# Patient Record
Sex: FEMALE | Race: WHITE | NOT HISPANIC OR LATINO | Employment: OTHER | URBAN - METROPOLITAN AREA
[De-identification: names, ages, dates, MRNs, and addresses within clinical notes are randomized per-mention and may not be internally consistent; named-entity substitution may affect disease eponyms.]

---

## 2017-01-09 RX ORDER — LEVOTHYROXINE SODIUM 0.05 MG/1
50 TABLET ORAL
Qty: 30 TAB | Refills: 5 | Status: SHIPPED | OUTPATIENT
Start: 2017-01-09 | End: 2017-03-01 | Stop reason: SDUPTHER

## 2017-03-01 DIAGNOSIS — Z13.220 SCREENING FOR HYPERLIPIDEMIA: ICD-10-CM

## 2017-03-01 DIAGNOSIS — E03.9 ACQUIRED HYPOTHYROIDISM: ICD-10-CM

## 2017-03-01 DIAGNOSIS — Z13.1 SCREENING FOR DIABETES MELLITUS: ICD-10-CM

## 2017-03-01 DIAGNOSIS — Z13.0 SCREENING FOR DEFICIENCY ANEMIA: ICD-10-CM

## 2017-03-01 RX ORDER — LEVOTHYROXINE SODIUM 0.05 MG/1
50 TABLET ORAL
Qty: 90 TAB | Refills: 0 | Status: SHIPPED | OUTPATIENT
Start: 2017-03-01 | End: 2017-03-03 | Stop reason: SDUPTHER

## 2017-03-02 NOTE — TELEPHONE ENCOUNTER
Was the patient seen in the last year in this department? Yes     Does patient have an active prescription for medications requested? No     Received Request Via: Patient     Patient changed mail in pharmacies at the beginning of the year.

## 2017-03-03 RX ORDER — LEVOTHYROXINE SODIUM 0.05 MG/1
50 TABLET ORAL
Qty: 90 TAB | Refills: 0 | Status: SHIPPED | OUTPATIENT
Start: 2017-03-03 | End: 2017-07-31 | Stop reason: SDUPTHER

## 2017-03-03 NOTE — TELEPHONE ENCOUNTER
Patient left message apologizing for asking you to do this again, but [facility] called her and told stating her insurance is not covered under their program, she has to [facility]. Can you please send her prescription there.

## 2017-07-06 ENCOUNTER — PATIENT OUTREACH (OUTPATIENT)
Dept: HEALTH INFORMATION MANAGEMENT | Facility: OTHER | Age: 77
End: 2017-07-06

## 2017-07-06 NOTE — PROGRESS NOTES
7/6/17  [employer]Web[employer] Checked & Epic Updated: yes  HealthConnect Verified: no  Verify PCP: yes    Communication Preference Obtained: yes     Review Care Team: yes    Annual Wellness Visit Scheduling  1. Scheduling Status:Scheduled     Care Gap Scheduling (Attempt to Schedule EACH Overdue Care Gap!)     Health Maintenance Due   Topic Date Due   • Annual Wellness Visit  Scheduled   • IMM ZOSTER VACCINE  Scheduled   • MAMMOGRAM  Scheduled         [employer] Activation: sent activation code  [employer] Katie: yes  Virtual Visits: yes  Opt In to Text Messages: yes

## 2017-07-25 ENCOUNTER — OFFICE VISIT (OUTPATIENT)
Dept: MEDICAL GROUP | Facility: MEDICAL CENTER | Age: 77
End: 2017-07-25
Payer: MEDICARE

## 2017-07-25 VITALS
HEIGHT: 60 IN | SYSTOLIC BLOOD PRESSURE: 112 MMHG | HEART RATE: 67 BPM | OXYGEN SATURATION: 99 % | BODY MASS INDEX: 25.52 KG/M2 | TEMPERATURE: 98.6 F | DIASTOLIC BLOOD PRESSURE: 62 MMHG | WEIGHT: 130 LBS

## 2017-07-25 DIAGNOSIS — M87.180: ICD-10-CM

## 2017-07-25 DIAGNOSIS — E55.9 VITAMIN D DEFICIENCY: ICD-10-CM

## 2017-07-25 DIAGNOSIS — Z00.00 MEDICARE ANNUAL WELLNESS VISIT, INITIAL: Primary | ICD-10-CM

## 2017-07-25 DIAGNOSIS — T45.8X5A: ICD-10-CM

## 2017-07-25 DIAGNOSIS — E03.4 HYPOTHYROIDISM DUE TO ACQUIRED ATROPHY OF THYROID: ICD-10-CM

## 2017-07-25 PROCEDURE — G0438 PPPS, INITIAL VISIT: HCPCS | Performed by: NURSE PRACTITIONER

## 2017-07-25 ASSESSMENT — PATIENT HEALTH QUESTIONNAIRE - PHQ9: CLINICAL INTERPRETATION OF PHQ2 SCORE: 0

## 2017-07-25 NOTE — PATIENT INSTRUCTIONS
Continue with care through Anabelle Pryor M.D..  Next Medicare Annual Wellness Visit is due in one year.    Continue care with specialists you are seeing for your chronic problems.    Attached is some preventative information for you to read.          Fall Prevention and Home Safety  Falls cause injuries and can affect all age groups. It is possible to prevent falls.   HOW TO PREVENT FALLS  · Wear shoes with rubber soles that do not have an opening for your toes.   · Keep the inside and outside of your house well lit.   · Use night lights throughout your home.   · Remove clutter from floors.   · Clean up floor spills.   · Remove throw rugs or fasten them to the floor with carpet tape.   · Do not place electrical cords across pathways.   · Put grab bars by your tub, shower, and toilet. Do not use towel bars as grab bars.   · Put handrails on both sides of the stairway. Fix loose handrails.   · Do not climb on stools or stepladders, if possible.   · Do not wax your floors.   · Repair uneven or unsafe sidewalks, walkways, or stairs.   · Keep items you use a lot within reach.   · Be aware of pets.   · Keep emergency numbers next to the telephone.   · Put smoke detectors in your home and near bedrooms.   Ask your doctor what other things you can do to prevent falls.  Document Released: 10/14/2010 Document Revised: 06/18/2013 Document Reviewed: 03/19/2013  ExitCare® Patient Information ©2013 [employer].    Exercise to Stay Healthy      Exercise helps you become and stay healthy.  EXERCISE IDEAS AND TIPS  Choose exercises that:  · You enjoy.   · Fit into your day.   You do not need to exercise really hard to be healthy. You can do exercises at a slow or medium level and stay healthy. You can:  · Stretch before and after working out.   · Try yoga, Pilates, or demetri chi.   · Lift weights.   · Walk fast, swim, jog, run, climb stairs, bicycle, dance, or rollerskate.   · Take aerobic classes.   Exercises that burn about 150  calories:  · Running 1 ½ miles in 15 minutes.   · Playing volleyball for 45 to 60 minutes.   · Washing and waxing a car for 45 to 60 minutes.   · Playing touch football for 45 minutes.   · Walking 1 ¾ miles in 35 minutes.   · Pushing a stroller 1 ½ miles in 30 minutes.   · Playing basketball for 30 minutes.   · Raking leaves for 30 minutes.   · Bicycling 5 miles in 30 minutes.   · Walking 2 miles in 30 minutes.   · Dancing for 30 minutes.   · Shoveling snow for 15 minutes.   · Swimming laps for 20 minutes.   · Walking up stairs for 15 minutes.   · Bicycling 4 miles in 15 minutes.   · Gardening for 30 to 45 minutes.   · Jumping rope for 15 minutes.   · Washing windows or floors for 45 to 60 minutes.     One suggestion is to start walking for 10 minutes after each meal.  This will help with digestion and help you to metabolize your meal.       For Weight Loss -   Recommend portion sizes with more fruits/vegetables/high fiber foods.  Stay away from white bread/pastas/white rice/white potatoes.  Increase Fluid intake to 6-8 glasses of water daily.   Eliminate soda's (diet and regular) from your fluid intake.      Document Released: 01/20/2012 Document Revised: 03/11/2013 Document Reviewed: 01/20/2012  ExitCare® Patient Information ©2013 [employer].    Fat and Cholesterol Control Diet  Cholesterol is a wax-like substance. It comes from your liver and is found in certain foods. There is good (HDL) and bad (LDL) cholesterol. Too much cholesterol in your blood can affect your heart. Certain foods can lower or raise your cholesterol. Eat foods that are low in cholesterol.  Saturated and trans fats are bad fats found in foods that will raise your cholesterol. Do not eat foods that are high in saturated and trans fats.  FOODS HIGHER IN SATURATED AND TRANS FATS  · Dairy products, such as whole milk, eggs, cheese, cream, and butter.   · Fatty meats, such as hot dogs, sausage, and salami.   · Fried foods.   · Trans fats which  are found in margarine and pre-made cookies, crackers, and baked goods.   · Tropical oils, such as coconut and palm oils.   Read package labels at the store. Do not buy products that use saturated or trans fats or hydrogenated oils. Find foods labeled:  · Low-fat.   · Low-saturated fat.   · Trans-fat-free.   · Low-cholesterol.   FOODS LOWER IN CHOLESTEROL  ·  Fruit.   · Vegetables.   · Beans, peas, and lentils.   · Fish.   · Lean meat, such as chicken (without skin) or ground turkey.   · Grains, such as barley, rice, couscous, bulgur wheat, and pasta.   · Heart-healthy tub margarine.   PREPARING YOUR FOOD  · Broil, bake, steam, or roast foods. Do not car food.   · Use non-stick cooking sprays.   · Use lemon or herbs to flavor food instead of using butter or stick margarine.   · Use nonfat yogurt, salsa, or low-fat dressings for salads.   Document Released: 06/18/2013 Document Reviewed: 06/18/2013  ExitCare® Patient Information ©2013 [email or web address].    Recommend annual flu vaccine.  Next due in Fall, 2015.  If you decide not to have the flu vaccine, recommend good handwashing and staying out of crowds during flu season.

## 2017-07-25 NOTE — PROGRESS NOTES
CC:   Medicare Annual Wellness Visit    HPI:  Cristin is a 77 y.o. 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     • vitamin D (CHOLECALCIFEROL) 1000 UNIT Tab Take 1,000 Units by mouth every day.     • levothyroxine (SYNTHROID) 50 MCG Tab Take 1 Tab by mouth Every morning on an empty stomach. 90 Tab 0   • MULTIVITAMINS PO every day.        No current facility-administered medications for this visit.      Current supplements: no  Chronic narcotic pain medicines: no  Allergies: Boniva; Clindamycin; Ibuprofen; Penicillins; and Tape  Exercise: yes active    Current social contact/activities: Has support of family and friends      Screening:  Depression Screening    Little interest or pleasure in doing things?  0 - not at all  Feeling down, depressed , or hopeless? 0 - not at all  Trouble falling or staying asleep, or sleeping too much?     Feeling tired or having little energy?     Poor appetite or overeating?     Feeling bad about yourself - or that you are a failure or have let yourself or your family down?    Trouble concentrating on things, such as reading the newspaper or watching television?    Moving or speaking so slowly that other people could have noticed.  Or the opposite - being so fidgety or restless that you have been moving around a lot more than usual?     Thoughts that you would be better off dead, or of hurting yourself?     Patient Health Questionnaire Score:      If depressive symptoms identified deferred to follow up visit unless specifically addressed in assessment and plan.    Interpretation of PHQ-9  Total Score   Score Severity   1-4 No Depression   5-9 Mild Depression   10-14 Moderate Depression   15-19 Moderately Severe Depression   20-27 Severe Depression      Screening for Cognitive Impairment    Three Minute Recall (apple, watch, johann)  3/3    Draw clock face with all 12 numbers set to the hand to show 10 minutes past 11 o'clock  1 5  Cognitive concerns identified deferred for follow up unless specifically addressed in assessment and plan.    Fall Risk Assessment    Has the patient had two or more falls in the last year or any fall with injury in the last year?  No    Safety Assessment    Throw rugs on floor.  Yes  Handrails on all stairs.  Yes  Good lighting in all hallways.  Yes  Difficulty hearing.  No  Patient counseled about all safety risks that were identified.    Functional Assessment ADLs    Are there any barriers preventing you from cooking for yourself or meeting nutritional needs?  No.    Are there any barriers preventing you from driving safely or obtaining transportation?  No.    Are there any barriers preventing you from using a telephone or calling for help?  No.    Are there any barriers preventing you from shopping?  No.    Are there any barriers preventing you from taking care of your own finances?  No.    Are there any barriers preventing you from managing your medications?  No.    Are currently engaging any exercise or physical activity?  Yes.       Health Maintenance Summary                Annual Wellness Visit Overdue 1940     IMM ZOSTER VACCINE Overdue 7/8/2002     MAMMOGRAM Overdue 6/21/2017      Done 6/21/2016 MA-SCREEN MAMMO W/CAD-BILAT     Patient has more history with this topic...    IMM INFLUENZA Next Due 9/1/2017      Done 9/23/2016 Imm Admin: Influenza Vaccine Adult HD    BONE DENSITY Next Due 6/21/2021      Done 6/21/2016 DS-BONE DENSITY STUDY (DEXA)     Patient has more history with this topic...    COLONOSCOPY Next Due 4/1/2025      Done 4/1/2015     IMM  DTaP/Tdap/Td Vaccine Next Due 4/7/2025      Done 4/7/2015 Imm Admin: Tdap Vaccine          Patient Care Team:  Anabelle Pryor M.D. as PCP - General        Social History   Substance Use Topics   • Smoking status: Never Smoker    • Smokeless tobacco: Never Used   • Alcohol Use: 1.2 oz/week     2 Glasses of wine per week       Family History   Problem Relation Age of Onset   • Other Father 69     aneurysm   • Diabetes Mother      She  has a past medical history of Arthritis; Pain; ASTHMA; Osteoporosis; and Hypothyroidism (acquired). She also has no past medical history of Breast cancer (CMS-HCC) or Encounter for long-term (current) use of other medications.   Past Surgical History   Procedure Laterality Date   • Appendectomy  1952   • Tubal ligation  1970   • Other  1995     surg for incontinence   • Bunionectomy  1999     right   • Carpal tunnel rel  2005     right   • Other  2008     sinus surg   • Shoulder decompression arthroscopic  10/23/08     Performed by ADDY VILLEGAS at [facility]   • Shoulder arthroscopy w/ rotator cuff repair  10/23/08     Performed by ADDY VILLEGAS at [facility]   • Clavicle distal excision  10/23/08     Performed by ADDY VILLEGAS at [facility]       ROS:    Ostomy or other tubes or amputations: no  Chronic oxygen use no  Last eye exam 3/2017  : Denies incontinence.   Gait: Uses no assistive device   Hearing excellent.    Dentition good    Exam: Blood pressure 112/62, pulse 67, temperature 37 °C (98.6 °F), height 1.524 m (5'), weight 58.968 kg (130 lb), SpO2 99 %. Body mass index is 25.39 kg/(m^2).  Alert, oriented in no acute distress.  Eye contact is good, speech goal directed, affect calm      Assessment and Plan. The following treatment and monitoring plan is recommended for all problems as listed below:   Vitamin D deficiency  Chronic condition per pt  No labs to review   Continue with current meds   Followed by Anabelle Pryor M.D..     "    Osteoporosis  Next DEXA 2021  Currently on D3 and Ca, plan is to resume ? boniva in 3 yrs  Followed by Anabelle Pryor M.D..     Hypothyroidism due to acquired atrophy of thyroid  Chronic condition managed with current medical regimen  Stable per review   Continue with current meds  Followed by Anabelle Pryor M.D..        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         Health Care Screening recommendations reviewed with patient today: per Patient Instructions  DPA/Advanced directive: .has an advanced directive - recom a copy be provided    Next office visit for recheck of chronic medical conditions is due with Anabelle Pryor M.D. 7/27/2017    Mammogram sched for 8/28/2017          "

## 2017-07-25 NOTE — ASSESSMENT & PLAN NOTE
"Per pt treated in past with loss of bone  Per pt has residual \"bumps\" in mouth  Followed by Anabelle Pryor M.D..   "

## 2017-07-25 NOTE — MR AVS SNAPSHOT
Cristin Elizalde   2017 10:40 AM   Office Visit   MRN: [id number]    Department:  [facility]   Dept Phone:  [phone]    Description:  Female : 1940   Provider:  CLIFF Tapia           Reason for Visit     Annual Exam initial      Allergies as of 2017     Allergen Noted Reactions    Boniva [Ibandronic Acid] 2016   Unspecified    Had jaw necrosis.  Also had same reaction on Fosamax    Clindamycin 10/20/2008   Hives    Ibuprofen 10/20/2008   Anaphylaxis    Penicillins 10/20/2008   Hives    Tape 10/23/2008   Hives      You were diagnosed with     Medicare annual wellness visit, initial   [781565]  -  Primary     Vitamin D deficiency   [1419722]       Hypothyroidism due to acquired atrophy of thyroid   [1750094]       Oral bisphosphonate-related jaw necrosis (CMS-HCC)   [295181]         Vital Signs     Blood Pressure Pulse Temperature Height Weight Body Mass Index    112/62 mmHg 67 37 °C (98.6 °F) 1.524 m (5') 58.968 kg (130 lb) 25.39 kg/m2    Oxygen Saturation Smoking Status                99% Never Smoker           Basic Information     Date Of Birth Sex Race Ethnicity Preferred Language    1940 Female White Non- English      Your appointments     2017  8:40 AM   ANNUAL EXAM PREVENTATIVE with Anabelle rPyor M.D.   [facility])    [address]  [place] [address]   [phone]            Aug 28, 2017 11:40 AM   MA SCRN10 with TINO ESCOTO MG 1   [facility] ([place])    [address]  [place] [address] [phone]           No deodorant, powder, perfume or lotion under the arm or breast area.              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   11/18/2016 - Present    Vitamin D deficiency E55.9   11/18/2016 - Present      Health Maintenance        Date Due Completion Dates    IMM ZOSTER VACCINE 7/8/2002 ---    MAMMOGRAM 6/21/2017 6/21/2016, 5/1/2015, 4/29/2014, 4/11/2013, 3/15/2012, 3/2/2011, 1/15/2010, 1/13/2009, 1/13/2009, 9/5/2007, 9/5/2007, 4/19/2006, 4/7/2005, 4/6/2004    IMM INFLUENZA (1) 9/1/2017 9/23/2016    BONE DENSITY 6/21/2021 6/21/2016, 3/15/2012, 1/15/2010, 9/5/2007, 4/19/2006, 3/3/2005    COLONOSCOPY 4/1/2025 4/1/2015 (Done)    Override on 4/1/2015: Done    IMM DTaP/Tdap/Td Vaccine (2 - Td) 4/7/2025 4/7/2015            Current Immunizations     13-VALENT PCV PREVNAR 4/7/2015    Hepatitis A Vaccine, Adult 4/29/2014, 9/17/2013    Influenza Vaccine Adult HD 9/23/2016    Pneumococcal polysaccharide vaccine (PPSV-23) 9/17/2013    SHINGLES VACCINE 1/25/2014    Tdap Vaccine 4/7/2015      Below and/or attached are the medications your provider expects you to take. Review all of your home medications and newly ordered medications with your provider and/or pharmacist. Follow medication instructions as directed by your provider and/or pharmacist. Please keep your medication list with you and share with your provider. Update the information when medications are discontinued, doses are changed, or new medications (including over-the-counter products) are added; and carry medication information at all times in the event of emergency situations     Allergies:  BONIVA - Unspecified     CLINDAMYCIN - Hives     IBUPROFEN - Anaphylaxis     PENICILLINS - Hives     TAPE - Hives               Medications  Valid as of: July 25, 2017 - 11:16 AM    Generic Name Brand Name Tablet Size Instructions for use    Calcium Acetate (Phos Binder) (Cap) PHOS- MG Take 1,334 mg by mouth 3 times a day, with meals.        Cholecalciferol (Tab) cholecalciferol 1000 UNIT Take 1,000 Units by mouth every day.        Levothyroxine Sodium (Tab) SYNTHROID 50 MCG  Take 1 Tab by mouth Every morning on an empty stomach.        Multiple Vitamin   every day.         .                 Medicines prescribed today were sent to:     [facility] - [place], [place] - [address] AT [facility]    [address] [place] [address]    Phone: [phone] Fax: [phone]    Open 24 Hours?: No      Medication refill instructions:       If your prescription bottle indicates you have medication refills left, it is not necessary to call your provider’s office. Please contact your pharmacy and they will refill your medication.    If your prescription bottle indicates you do not have any refills left, you may request refills at any time through one of the following ways: The online [employer] system (except Urgent Care), by calling your provider’s office, or by asking your pharmacy to contact your provider’s office with a refill request. Medication refills are processed only during regular business hours and may not be available until the next business day. Your provider may request additional information or to have a follow-up visit with you prior to refilling your medication.   *Please Note: Medication refills are assigned a new Rx number when refilled electronically. Your pharmacy may indicate that no refills were authorized even though a new prescription for the same medication is available at the pharmacy. Please request the medicine by name with the pharmacy before contacting your provider for a refill.        Instructions    Continue with care through Anabelle Pryor M.D..  Next Medicare Annual Wellness Visit is due in one year.    Continue care with specialists you are seeing for your chronic problems.    Attached is some preventative information for you to read.          Fall Prevention and Home Safety  Falls cause injuries and can affect all age groups. It is possible to prevent falls.   HOW TO PREVENT FALLS  · Wear shoes with rubber soles that do not  have an opening for your toes.   · Keep the inside and outside of your house well lit.   · Use night lights throughout your home.   · Remove clutter from floors.   · Clean up floor spills.   · Remove throw rugs or fasten them to the floor with carpet tape.   · Do not place electrical cords across pathways.   · Put grab bars by your tub, shower, and toilet. Do not use towel bars as grab bars.   · Put handrails on both sides of the stairway. Fix loose handrails.   · Do not climb on stools or stepladders, if possible.   · Do not wax your floors.   · Repair uneven or unsafe sidewalks, walkways, or stairs.   · Keep items you use a lot within reach.   · Be aware of pets.   · Keep emergency numbers next to the telephone.   · Put smoke detectors in your home and near bedrooms.   Ask your doctor what other things you can do to prevent falls.  Document Released: 10/14/2010 Document Revised: 06/18/2013 Document Reviewed: 03/19/2013  ExitCare® Patient Information ©2013 fake company [employer].    Exercise to Stay Healthy      Exercise helps you become and stay healthy.  EXERCISE IDEAS AND TIPS  Choose exercises that:  · You enjoy.   · Fit into your day.   You do not need to exercise really hard to be healthy. You can do exercises at a slow or medium level and stay healthy. You can:  · Stretch before and after working out.   · Try yoga, Pilates, or demetri chi.   · Lift weights.   · Walk fast, swim, jog, run, climb stairs, bicycle, dance, or rollerskate.   · Take aerobic classes.   Exercises that burn about 150 calories:  · Running 1 ½ miles in 15 minutes.   · Playing volleyball for 45 to 60 minutes.   · Washing and waxing a car for 45 to 60 minutes.   · Playing touch football for 45 minutes.   · Walking 1 ¾ miles in 35 minutes.   · Pushing a stroller 1 ½ miles in 30 minutes.   · Playing basketball for 30 minutes.   · Raking leaves for 30 minutes.   · Bicycling 5 miles in 30 minutes.   · Walking 2 miles in 30 minutes.   · Dancing for 30  minutes.   · Shoveling snow for 15 minutes.   · Swimming laps for 20 minutes.   · Walking up stairs for 15 minutes.   · Bicycling 4 miles in 15 minutes.   · Gardening for 30 to 45 minutes.   · Jumping rope for 15 minutes.   · Washing windows or floors for 45 to 60 minutes.     One suggestion is to start walking for 10 minutes after each meal.  This will help with digestion and help you to metabolize your meal.       For Weight Loss -   Recommend portion sizes with more fruits/vegetables/high fiber foods.  Stay away from white bread/pastas/white rice/white potatoes.  Increase Fluid intake to 6-8 glasses of water daily.   Eliminate soda's (diet and regular) from your fluid intake.      Document Released: 01/20/2012 Document Revised: 03/11/2013 Document Reviewed: 01/20/2012  ExitCare® Patient Information ©2013 ExitCare, LLC.    Fat and Cholesterol Control Diet  Cholesterol is a wax-like substance. It comes from your liver and is found in certain foods. There is good (HDL) and bad (LDL) cholesterol. Too much cholesterol in your blood can affect your heart. Certain foods can lower or raise your cholesterol. Eat foods that are low in cholesterol.  Saturated and trans fats are bad fats found in foods that will raise your cholesterol. Do not eat foods that are high in saturated and trans fats.  FOODS HIGHER IN SATURATED AND TRANS FATS  · Dairy products, such as whole milk, eggs, cheese, cream, and butter.   · Fatty meats, such as hot dogs, sausage, and salami.   · Fried foods.   · Trans fats which are found in margarine and pre-made cookies, crackers, and baked goods.   · Tropical oils, such as coconut and palm oils.   Read package labels at the store. Do not buy products that use saturated or trans fats or hydrogenated oils. Find foods labeled:  · Low-fat.   · Low-saturated fat.   · Trans-fat-free.   · Low-cholesterol.   FOODS LOWER IN CHOLESTEROL  ·  Fruit.   · Vegetables.   · Beans, peas, and lentils.   · Fish.   · Lean  meat, such as chicken (without skin) or ground turkey.   · Grains, such as barley, rice, couscous, bulgur wheat, and pasta.   · Heart-healthy tub margarine.   PREPARING YOUR FOOD  · Broil, bake, steam, or roast foods. Do not car food.   · Use non-stick cooking sprays.   · Use lemon or herbs to flavor food instead of using butter or stick margarine.   · Use nonfat yogurt, salsa, or low-fat dressings for salads.   Document Released: 06/18/2013 Document Reviewed: 06/18/2013  ExitCare® Patient Information ©2013 [employer].    Recommend annual flu vaccine.  Next due in Fall, 2015.  If you decide not to have the flu vaccine, recommend good handwashing and staying out of crowds during flu season.                  [employer] Access Code: [email or web address]  Expires: 8/5/2017  3:10 PM    KupiKupon  A secure, online tool to manage your health information     [employer]’s KupiKupon® is a secure, online tool that connects you to your personalized health information from the privacy of your home -- day or night - making it very easy for you to manage your healthcare. Once the activation process is completed, you can even access your medical information using the KupiKupon katie, which is available for free in the Apple Katie store or Google Play store.     Kup[employer]Kupon provides the following levels of access (as shown below):   My Chart Features   [facility] Doctor [facility]  Urgent  Care Non-[facility]  Primary Care  Doctor   Email your healthcare team securely and privately 24/7 X X X    Manage appointments: schedule your next appointment; view details of past/upcoming appointments X      Request prescription refills. X      View recent personal medical records, including lab and immunizations X X X X   View health record, including health history, allergies, medications X X X X   Read reports about your outpatient visits, procedures, consult and ER notes X X X X   See your discharge summary, which is a recap of your  hospital and/or ER visit that includes your diagnosis, lab results, and care plan. X X       How to register for [employer]:  1. Go to  [email or web address].  2. Click on the Sign Up Now box, which takes you to the New Member Sign Up page. You will need to provide the following information:  a. Enter your [employer] Access Code exactly as it appears at the top of this page. (You will not need to use this code after you’ve completed the sign-up process. If you do not sign up before the expiration date, you must request a new code.)   b. Enter your date of birth.   c. Enter your home email address.   d. Click Submit, and follow the next screen’s instructions.  3. Create a Housebitest ID. This will be your [employer] login ID and cannot be changed, so think of one that is secure and easy to remember.  4. Create a House[employer]est password. You can change your password at any time.  5. Enter your Password Reset Question and Answer. This can be used at a later time if you forget your password.   6. Enter your e-mail address. This allows you to receive e-mail notifications when new information is available in [employer].  7. Click Sign Up. You can now view your health information.    For assistance activating your [employer] account, call [phone]

## 2017-07-25 NOTE — ASSESSMENT & PLAN NOTE
Chronic condition per pt  No labs to review   Continue with current meds   Followed by Anabelle Pryor M.D..

## 2017-07-25 NOTE — ASSESSMENT & PLAN NOTE
Chronic condition managed with current medical regimen  Stable per review   Continue with current meds  Followed by Anabelle Pryor M.D..

## 2017-07-25 NOTE — ASSESSMENT & PLAN NOTE
Next DEXA 2021  Currently on D3 and Ca, plan is to resume ? boniva in 3 yrs  Followed by Anabelle Pryor M.D..

## 2017-07-27 ENCOUNTER — OFFICE VISIT (OUTPATIENT)
Dept: MEDICAL GROUP | Facility: MEDICAL CENTER | Age: 77
End: 2017-07-27
Payer: MEDICARE

## 2017-07-27 VITALS
OXYGEN SATURATION: 98 % | HEART RATE: 70 BPM | TEMPERATURE: 97.5 F | WEIGHT: 126 LBS | SYSTOLIC BLOOD PRESSURE: 100 MMHG | BODY MASS INDEX: 24.74 KG/M2 | HEIGHT: 60 IN | RESPIRATION RATE: 16 BRPM | DIASTOLIC BLOOD PRESSURE: 60 MMHG

## 2017-07-27 DIAGNOSIS — E03.4 HYPOTHYROIDISM DUE TO ACQUIRED ATROPHY OF THYROID: ICD-10-CM

## 2017-07-27 DIAGNOSIS — Z13.220 ENCOUNTER FOR LIPID SCREENING FOR CARDIOVASCULAR DISEASE: ICD-10-CM

## 2017-07-27 DIAGNOSIS — M81.0 OSTEOPOROSIS WITHOUT CURRENT PATHOLOGICAL FRACTURE, UNSPECIFIED OSTEOPOROSIS TYPE: ICD-10-CM

## 2017-07-27 DIAGNOSIS — Z13.6 ENCOUNTER FOR LIPID SCREENING FOR CARDIOVASCULAR DISEASE: ICD-10-CM

## 2017-07-27 DIAGNOSIS — Z13.1 SCREENING FOR DIABETES MELLITUS: ICD-10-CM

## 2017-07-27 PROCEDURE — 99214 OFFICE O/P EST MOD 30 MIN: CPT | Performed by: FAMILY MEDICINE

## 2017-07-27 NOTE — MR AVS SNAPSHOT
Cristin Elizalde   2017 8:20 AM   Office Visit   MRN: [id number]    Department:  [facility]   Dept Phone:  [phone]    Description:  Female : 1940   Provider:  Anabelle Pryor M.D.           Reason for Visit     Follow-Up     Medication Management     Medication Refill           Allergies as of 2017     Allergen Noted Reactions    Boniva [Ibandronic Acid] 2016   Unspecified    Had jaw necrosis.  Also had same reaction on Fosamax    Clindamycin 10/20/2008   Hives    Ibuprofen 10/20/2008   Anaphylaxis    Penicillins 10/20/2008   Hives    Tape 10/23/2008   Hives      You were diagnosed with     Hypothyroidism due to acquired atrophy of thyroid   [0068289]       Osteoporosis without current pathological fracture, unspecified osteoporosis type   [2085553]       Screening for diabetes mellitus   [V77.1.ICD-9-CM]       Encounter for lipid screening for cardiovascular disease   [9299140]         Vital Signs     Blood Pressure Pulse Temperature Respirations Height Weight    100/60 mmHg 70 36.4 °C (97.5 °F) 16 1.524 m (5') 57.153 kg (126 lb)    Body Mass Index Oxygen Saturation Breastfeeding? Smoking Status          24.61 kg/m2 98% No Never Smoker         Basic Information     Date Of Birth Sex Race Ethnicity Preferred Language    1940 Female White Non- English      Your appointments     Aug 28, 2017 11:40 AM   MA SCRN10 with TINO ESCOTO MG 1   [facility] ([place])    [address]  [place] [address] [phone]           No deodorant, powder, perfume or lotion under the arm or breast area.              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   2016 - Present    Vitamin D deficiency E55.9    11/18/2016 - Present      Health Maintenance        Date Due Completion Dates    MAMMOGRAM 6/21/2017 6/21/2016, 5/1/2015, 4/29/2014, 4/11/2013, 3/15/2012, 3/2/2011, 1/15/2010, 1/13/2009, 1/13/2009, 9/5/2007, 9/5/2007, 4/19/2006, 4/7/2005, 4/6/2004    IMM INFLUENZA (1) 9/1/2017 9/23/2016    BONE DENSITY 6/21/2021 6/21/2016, 3/15/2012, 1/15/2010, 9/5/2007, 4/19/2006, 3/3/2005    COLONOSCOPY 4/1/2025 4/1/2015 (Done)    Override on 4/1/2015: Done    IMM DTaP/Tdap/Td Vaccine (2 - Td) 4/7/2025 4/7/2015            Current Immunizations     13-VALENT PCV PREVNAR 4/7/2015    Hepatitis A Vaccine, Adult 4/29/2014, 9/17/2013    Influenza Vaccine Adult HD 9/23/2016    Pneumococcal polysaccharide vaccine (PPSV-23) 9/17/2013    SHINGLES VACCINE 1/25/2014    Tdap Vaccine 4/7/2015      Below and/or attached are the medications your provider expects you to take. Review all of your home medications and newly ordered medications with your provider and/or pharmacist. Follow medication instructions as directed by your provider and/or pharmacist. Please keep your medication list with you and share with your provider. Update the information when medications are discontinued, doses are changed, or new medications (including over-the-counter products) are added; and carry medication information at all times in the event of emergency situations     Allergies:  BONIVA - Unspecified     CLINDAMYCIN - Hives     IBUPROFEN - Anaphylaxis     PENICILLINS - Hives     TAPE - Hives               Medications  Valid as of: July 27, 2017 -  9:47 AM    Generic Name Brand Name Tablet Size Instructions for use    Cholecalciferol (Tab) cholecalciferol 1000 UNIT Take 1,000 Units by mouth every day.        Levothyroxine Sodium (Tab) SYNTHROID 50 MCG Take 1 Tab by mouth Every morning on an empty stomach.        Multiple Vitamin   every day.         .                 Medicines prescribed today were sent to:     [facility] - [place], [place] - [address] AT [facility]    [address] [facility] [address]    Phone: [phone] Fax: [phone]    Open 24 Hours?: No      Medication refill instructions:       If your prescription bottle indicates you have medication refills left, it is not necessary to call your provider’s office. Please contact your pharmacy and they will refill your medication.    If your prescription bottle indicates you do not have any refills left, you may request refills at any time through one of the following ways: The online [employer] system (except Urgent Care), by calling your provider’s office, or by asking your pharmacy to contact your provider’s office with a refill request. Medication refills are processed only during regular business hours and may not be available until the next business day. Your provider may request additional information or to have a follow-up visit with you prior to refilling your medication.   *Please Note: Medication refills are assigned a new Rx number when refilled electronically. Your pharmacy may indicate that no refills were authorized even though a new prescription for the same medication is available at the pharmacy. Please request the medicine by name with the pharmacy before contacting your provider for a refill.           [employer] Access Code: [email or web address]  Expires: 8/5/2017  3:10 PM    [employer]  A secure, online tool to manage your health information     [employer]’s [employer]® is a secure, online tool that connects you to your personalized health information from the privacy of your home -- day or night - making it very easy for you to manage your healthcare. Once the activation process is completed, you can even access your medical information using the [employer] katie, which is available for free in the Apple Katie store or Google Play store.     [employer] provides the following levels of access (as shown below):   My Chart Features   [facility] Primary Care Doctor  [facility]  Specialists [facility]  Urgent  Care Non-[facility]  Primary Care  Doctor   Email your healthcare team securely and privately 24/7 X X X    Manage appointments: schedule your next appointment; view details of past/upcoming appointments X      Request prescription refills. X      View recent personal medical records, including lab and immunizations X X X X   View health record, including health history, allergies, medications X X X X   Read reports about your outpatient visits, procedures, consult and ER notes X X X X   See your discharge summary, which is a recap of your hospital and/or ER visit that includes your diagnosis, lab results, and care plan. X X       How to register for [employer]INFO:  1. Go to  [email or web address].  2. Click on the Sign Up Now box, which takes you to the New Member Sign Up page. You will need to provide the following information:  a. Enter your 4INFO Access Code exactly as it appears at the top of this page. (You will not need to use this code after you’ve completed the sign-up process. If you do not sign up before the expiration date, you must request a new code.)   b. Enter your date of birth.   c. Enter your home email address.   d. Click Submit, and follow the next screen’s instructions.  3. Create a 4INFO ID. This will be your 4INFO login ID and cannot be changed, so think of one that is secure and easy to remember.  4. Create a 4INFO password. You can change your password at any time.  5. Enter your Password Reset Question and Answer. This can be used at a later time if you forget your password.   6. Enter your e-mail address. This allows you to receive e-mail notifications when new information is available in 4INFO.  7. Click Sign Up. You can now view your health information.    For assistance activating your 4INFO account, call [phone]

## 2017-07-31 NOTE — TELEPHONE ENCOUNTER
Was the patient seen in the last year in this department? Yes     Does patient have an active prescription for medications requested? No     Received Request Via: Pharmacy     Last seen: 07/27/2017 [place]

## 2017-08-01 RX ORDER — LEVOTHYROXINE SODIUM 0.05 MG/1
TABLET ORAL
Qty: 90 TAB | Refills: 3 | Status: SHIPPED | OUTPATIENT
Start: 2017-08-01 | End: 2018-08-09 | Stop reason: SDUPTHER

## 2017-08-03 NOTE — ASSESSMENT & PLAN NOTE
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  The proximal left femur has a mean bone mineral density of 0.807 g/cm2, with a T score of -1.6 and a Z score of 0.3.  When compared to the previous study dated 03/15/2012, there has been a 2.9% decrease in the bone mineral density of the left femur.

## 2017-08-03 NOTE — PROGRESS NOTES
Subjective:     Chief Complaint   Patient presents with   • Follow-Up   • Medication Management   • Medication Refill       Cristin Elizalde is a 77 y.o. female here today for evaluation and management of:    Hypothyroidism due to acquired atrophy of thyroid  Stable. Currently taking levothyroxine 50 mcg daily as prescribed.  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  The proximal left femur has a mean bone mineral density of 0.807 g/cm2, with a T score of -1.6 and a Z score of 0.3.  When compared to the previous study dated 03/15/2012, there has been a 2.9% decrease in the bone mineral density of the left femur.       Allergies   Allergen Reactions   • Boniva [Ibandronic Acid] Unspecified     Had jaw necrosis.  Also had same reaction on Fosamax   • Clindamycin Hives   • Ibuprofen Anaphylaxis   • Penicillins Hives   • Tape Hives       Current medicines (including changes today)  Current Outpatient Prescriptions   Medication Sig Dispense Refill   • vitamin D (CHOLECALCIFEROL) 1000 UNIT Tab Take 1,000 Units by mouth every day.     • MULTIVITAMINS PO every day.      • levothyroxine (SYNTHROID) 50 MCG Tab TAKE 1 TABLET ONCE DAILY INTHE MORNING ON AN EMPTY    STOMACH 90 Tab 3     No current facility-administered medications for this visit.       She  has a past medical history of Arthritis; Pain; ASTHMA; Osteoporosis; and Hypothyroidism (acquired). She also has no past medical history of Breast cancer (CMS-[facility]) or Encounter for long-term (current) use of other medications.    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  No nausea or vomiting.  No chest pain or palpitations.  No cough or SOB.  No pain with urination or hematuria.  No black or bloody stools.       Objective:     Blood pressure 100/60, pulse 70, temperature 36.4 °C (97.5 °F), resp. rate 16, height 1.524 m (5'), weight 57.153 kg (126 lb), SpO2 98 %, not currently breastfeeding. Body mass index is 24.61 kg/(m^2).   Physical Exam:  Well developed, well nourished.  Alert, oriented in no acute distress.  Eye contact is good, speech goal directed, affect calm  Eyes: conjunctiva non-injected, sclera non-icteric.  Neck Supple.  No adenopathy or masses in the neck or supraclavicular regions. No thyromegaly  Lungs: clear to auscultation bilaterally with good excursion. No wheezes or rhonchi  CV: regular rate and rhythm. No murmur  Abdomen: soft, nontender, no masses or organomegaly.  No rebound or guarding  Ext: no edema, color normal, vascularity normal, temperature normal          Assessment and Plan:   The following treatment plan was discussed    1. Hypothyroidism due to acquired atrophy of thyroid  Recheck thyroid levels adjust levothyroxine as needed  - TSH+FREE T4    2. Osteoporosis without current pathological fracture, unspecified osteoporosis type  Continue weightbearing exercise. And calcium and vitamin D supplementation    3. Screening for diabetes mellitus  Screening labs ordered.  Await results for counseling.    - COMP METABOLIC PANEL    4. Encounter for lipid screening for cardiovascular disease  Screening labs ordered.  Await results for counseling.    - LIPID PANEL W/ CHOL/HDL RATIO    Any change or worsening of signs or symptoms, patient encouraged to follow-up or report to the emergency room for further evaluation. Patient understands and agrees.    Followup: Return in about 6 months (around 1/27/2018).

## 2017-08-03 NOTE — ASSESSMENT & PLAN NOTE
Stable. Currently taking levothyroxine 50 mcg daily as prescribed.  Denies palpitations, skin changes, temperature intolerance, or changes in bowel habits

## 2017-08-28 ENCOUNTER — HOSPITAL ENCOUNTER (OUTPATIENT)
Dept: RADIOLOGY | Facility: MEDICAL CENTER | Age: 77
End: 2017-08-28
Attending: FAMILY MEDICINE
Payer: MEDICARE

## 2017-08-28 DIAGNOSIS — Z12.31 VISIT FOR SCREENING MAMMOGRAM: ICD-10-CM

## 2017-08-28 PROCEDURE — G0202 SCR MAMMO BI INCL CAD: HCPCS

## 2017-08-29 ENCOUNTER — TELEPHONE (OUTPATIENT)
Dept: MEDICAL GROUP | Facility: MEDICAL CENTER | Age: 77
End: 2017-08-29

## 2017-08-29 NOTE — TELEPHONE ENCOUNTER
----- Message from Anabelle Pryor M.D. sent at 8/29/2017  9:02 AM PDT -----  Please notify patient of their  normal mammogram results.  Anabelle Pryor M.D.

## 2018-02-01 DIAGNOSIS — M79.672 PAIN IN BOTH FEET: ICD-10-CM

## 2018-02-01 DIAGNOSIS — M79.642 PAIN IN BOTH HANDS: ICD-10-CM

## 2018-02-01 DIAGNOSIS — M79.641 PAIN IN BOTH HANDS: ICD-10-CM

## 2018-02-01 DIAGNOSIS — M79.671 PAIN IN BOTH FEET: ICD-10-CM

## 2018-03-02 ENCOUNTER — PATIENT MESSAGE (OUTPATIENT)
Dept: MEDICAL GROUP | Facility: MEDICAL CENTER | Age: 78
End: 2018-03-02

## 2018-03-02 DIAGNOSIS — M19.90 ARTHRITIS: ICD-10-CM

## 2018-03-02 DIAGNOSIS — M25.50 ARTHRALGIA, UNSPECIFIED JOINT: ICD-10-CM

## 2018-03-27 ENCOUNTER — HOSPITAL ENCOUNTER (OUTPATIENT)
Dept: LAB | Facility: MEDICAL CENTER | Age: 78
End: 2018-03-27
Attending: FAMILY MEDICINE
Payer: MEDICARE

## 2018-03-27 DIAGNOSIS — M19.90 ARTHRITIS: ICD-10-CM

## 2018-03-27 DIAGNOSIS — M25.50 ARTHRALGIA, UNSPECIFIED JOINT: ICD-10-CM

## 2018-03-27 LAB
ERYTHROCYTE [DISTWIDTH] IN BLOOD BY AUTOMATED COUNT: 44.2 FL (ref 35.9–50)
HCT VFR BLD AUTO: 40.4 % (ref 37–47)
HGB BLD-MCNC: 13.5 G/DL (ref 12–16)
MCH RBC QN AUTO: 30.8 PG (ref 27–33)
MCHC RBC AUTO-ENTMCNC: 33.4 G/DL (ref 33.6–35)
MCV RBC AUTO: 92 FL (ref 81.4–97.8)
PLATELET # BLD AUTO: 247 K/UL (ref 164–446)
PMV BLD AUTO: 11 FL (ref 9–12.9)
RBC # BLD AUTO: 4.39 M/UL (ref 4.2–5.4)
RHEUMATOID FACT SER IA-ACNC: <10 IU/ML (ref 0–14)
WBC # BLD AUTO: 4.3 K/UL (ref 4.8–10.8)

## 2018-03-27 PROCEDURE — 85027 COMPLETE CBC AUTOMATED: CPT

## 2018-03-27 PROCEDURE — 86200 CCP ANTIBODY: CPT

## 2018-03-27 PROCEDURE — 86038 ANTINUCLEAR ANTIBODIES: CPT

## 2018-03-27 PROCEDURE — 86225 DNA ANTIBODY NATIVE: CPT

## 2018-03-27 PROCEDURE — 36415 COLL VENOUS BLD VENIPUNCTURE: CPT

## 2018-03-27 PROCEDURE — 86235 NUCLEAR ANTIGEN ANTIBODY: CPT | Mod: 91

## 2018-03-27 PROCEDURE — 86431 RHEUMATOID FACTOR QUANT: CPT

## 2018-03-29 LAB
CCP IGG SERPL-ACNC: 4 UNITS (ref 0–19)
NUCLEAR IGG SER QL IA: NORMAL

## 2018-03-30 ENCOUNTER — PATIENT MESSAGE (OUTPATIENT)
Dept: MEDICAL GROUP | Facility: MEDICAL CENTER | Age: 78
End: 2018-03-30

## 2018-04-03 DIAGNOSIS — M06.00 SERONEGATIVE EROSIVE RHEUMATOID ARTHRITIS (HCC): ICD-10-CM

## 2018-06-17 ENCOUNTER — PATIENT MESSAGE (OUTPATIENT)
Dept: MEDICAL GROUP | Facility: MEDICAL CENTER | Age: 78
End: 2018-06-17

## 2018-06-19 ENCOUNTER — PATIENT MESSAGE (OUTPATIENT)
Dept: MEDICAL GROUP | Facility: MEDICAL CENTER | Age: 78
End: 2018-06-19

## 2018-06-19 NOTE — TELEPHONE ENCOUNTER
From: Cristin Elizalde  To: CLIFF aJra  Sent: 2018 1:55 PM PDT  Subject: Non-Urgent Medical Question    He is staying hydrated and he is taking his blood pressure Rx, his bp is low with or without his Rx.  Is the lab order at [facility]?  ----- Message -----  From: CLIFF Jara  Sent: 2018 1:17 PM PDT  To: Cristin Elizalde  Subject: RE: Non-Urgent Medical Question  Amrita,  I have briefly reviewed the recent history for Sal. I would like you to bring him into the lab to have his blood counts rechecked, if his anemia has worsened this may explain his symptoms. I have placed a lab order.   Is he staying hydrated? Is his blood pressure persistently low (on his medication)?  Glory DE LEÓN      ----- Message -----   From: Cristin Elizalde   Sent: 2018 10:36 AM PDT   To: CLIFF Jara  Subject: Non-Urgent Medical Question    His  is 1940  ----- Message -----  From: CLIFF Jara  Sent: 2018 10:14 AM PDT  To: Cristin Elizalde  Subject: RE: Non-Urgent Medical Question  Amrita,  My name is Glory Schneider, I am helping to cover for Dr. Pryor who is out of the office this week.  Can you please send me dentist since date of birth so I may review his medications and history to better provide advise?  Glory DE LEÓN      ----- Message -----   From: Cristin Elizalde   Sent: 2018 8:13 PM PDT   To: Anabelle Pryor M.D.  Subject: Non-Urgent Medical Question    This is regarding Sal. He is not up to par. His blood pressure is low, running about 100/50, resting heart rate is 82 to 86. When he goes up one flight of stairs his heart rate goes to 120 and he has a hard time catching his breath. His lips are sort of gray looking and his skin color is not good. Do you think we need to see you before his appointment on the . Thanks, Amrita [phone]

## 2018-06-19 NOTE — TELEPHONE ENCOUNTER
From: Cristin Elizalde  To: CLIFF Jara  Sent: 2018 10:36 AM PDT  Subject: Non-Urgent Medical Question    His  is 1940  ----- Message -----  From: CLIFF Jara  Sent: 2018 10:14 AM PDT  To: Cristin Elizalde  Subject: RE: Non-Urgent Medical Question  Amrita,  My name is Glory Schneider, I am helping to cover for Dr. Pryor who is out of the office this week.  Can you please send me dentist since date of birth so I may review his medications and history to better provide advise?  Glory Schneider APRN      ----- Message -----   From: Cristin Elizalde   Sent: 2018 8:13 PM PDT   To: Anabelle Pryor M.D.  Subject: Non-Urgent Medical Question    This is regarding Sal. He is not up to par. His blood pressure is low, running about 100/50, resting heart rate is 82 to 86. When he goes up one flight of stairs his heart rate goes to 120 and he has a hard time catching his breath. His lips are sort of gray looking and his skin color is not good. Do you think we need to see you before his appointment on the . Thanks, Amrita [phone]

## 2018-08-07 ENCOUNTER — OFFICE VISIT (OUTPATIENT)
Dept: MEDICAL GROUP | Facility: MEDICAL CENTER | Age: 78
End: 2018-08-07
Payer: MEDICARE

## 2018-08-07 VITALS
DIASTOLIC BLOOD PRESSURE: 60 MMHG | BODY MASS INDEX: 23.56 KG/M2 | HEART RATE: 65 BPM | RESPIRATION RATE: 20 BRPM | SYSTOLIC BLOOD PRESSURE: 108 MMHG | OXYGEN SATURATION: 97 % | HEIGHT: 60 IN | TEMPERATURE: 98 F | WEIGHT: 120 LBS

## 2018-08-07 DIAGNOSIS — E55.9 VITAMIN D DEFICIENCY: ICD-10-CM

## 2018-08-07 DIAGNOSIS — Z13.1 SCREENING FOR DIABETES MELLITUS: ICD-10-CM

## 2018-08-07 DIAGNOSIS — E03.4 HYPOTHYROIDISM DUE TO ACQUIRED ATROPHY OF THYROID: ICD-10-CM

## 2018-08-07 DIAGNOSIS — Z12.31 ENCOUNTER FOR SCREENING MAMMOGRAM FOR BREAST CANCER: ICD-10-CM

## 2018-08-07 DIAGNOSIS — M06.00 SERONEGATIVE EROSIVE RHEUMATOID ARTHRITIS (HCC): ICD-10-CM

## 2018-08-07 DIAGNOSIS — Z13.220 ENCOUNTER FOR LIPID SCREENING FOR CARDIOVASCULAR DISEASE: ICD-10-CM

## 2018-08-07 DIAGNOSIS — M85.89 OSTEOPENIA OF MULTIPLE SITES: ICD-10-CM

## 2018-08-07 DIAGNOSIS — D70.8 OTHER NEUTROPENIA (HCC): ICD-10-CM

## 2018-08-07 DIAGNOSIS — Z00.00 MEDICARE ANNUAL WELLNESS VISIT, SUBSEQUENT: ICD-10-CM

## 2018-08-07 DIAGNOSIS — Z13.6 ENCOUNTER FOR LIPID SCREENING FOR CARDIOVASCULAR DISEASE: ICD-10-CM

## 2018-08-07 DIAGNOSIS — Z78.0 MENOPAUSE: ICD-10-CM

## 2018-08-07 DIAGNOSIS — Z23 NEED FOR VACCINATION: ICD-10-CM

## 2018-08-07 PROCEDURE — G0439 PPPS, SUBSEQ VISIT: HCPCS | Performed by: FAMILY MEDICINE

## 2018-08-07 ASSESSMENT — ENCOUNTER SYMPTOMS: GENERAL WELL-BEING: GOOD

## 2018-08-07 ASSESSMENT — PATIENT HEALTH QUESTIONNAIRE - PHQ9: CLINICAL INTERPRETATION OF PHQ2 SCORE: 0

## 2018-08-07 ASSESSMENT — ACTIVITIES OF DAILY LIVING (ADL): BATHING_REQUIRES_ASSISTANCE: 0

## 2018-08-09 ENCOUNTER — PATIENT MESSAGE (OUTPATIENT)
Dept: MEDICAL GROUP | Facility: MEDICAL CENTER | Age: 78
End: 2018-08-09

## 2018-08-09 RX ORDER — LEVOTHYROXINE SODIUM 0.05 MG/1
TABLET ORAL
Qty: 90 TAB | Refills: 3 | Status: SHIPPED | OUTPATIENT
Start: 2018-08-09 | End: 2018-08-14 | Stop reason: SDUPTHER

## 2018-08-14 ENCOUNTER — PATIENT MESSAGE (OUTPATIENT)
Dept: MEDICAL GROUP | Facility: MEDICAL CENTER | Age: 78
End: 2018-08-14

## 2018-08-14 RX ORDER — LEVOTHYROXINE SODIUM 0.05 MG/1
TABLET ORAL
Qty: 90 TAB | Refills: 3 | Status: SHIPPED | OUTPATIENT
Start: 2018-08-14 | End: 2019-08-28 | Stop reason: SDUPTHER

## 2018-08-14 NOTE — PROGRESS NOTES
Chief Complaint   Patient presents with   • Annual Exam         HPI:  Cristin Elizalde is a 78 y.o. 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     • MULTIVITAMINS PO every day.      • levothyroxine (SYNTHROID) 50 MCG Tab TAKE 1 TABLET ONCE DAILY INTHE MORNING ON AN EMPTY    STOMACH 90 Tab 3     No current facility-administered medications for this visit.             Current supplements as per medication list.       Allergies: Boniva [ibandronic acid]; Clindamycin; Ibuprofen; Penicillins; and Tape    Current social contact/activities: Travels and is active with .    She  reports that she has never smoked. She has never used smokeless tobacco. She reports that she drinks about 1.2 oz of alcohol per week . She reports that she does not use drugs.  Counseling given: Not Answered      DPA/Advanced Directive:  Patient has Advanced Directive, but it is not on file. Instructed to bring in a copy to scan into their chart.    ROS:    Gait: Uses no assistive device  Ostomy: No  Other tubes: No  Amputations: No  Chronic oxygen use: No  Last eye exam: In the last year  Wears hearing aids: No   : Denies any urinary leakage during the last 6 months    Screening:    Depression Screening    Little interest or pleasure in doing things?  0 - not at all  Feeling down, depressed , or hopeless? 0 - not at all  Patient Health Questionnaire Score: 0     If depressive symptoms identified deferred to follow up visit unless  specifically addressed in assessment and plan.    Interpretation of PHQ-9 Total Score   Score Severity   1-4 No Depression   5-9 Mild Depression   10-14 Moderate Depression   15-19 Moderately Severe Depression   20-27 Severe Depression    Screening for Cognitive Impairment    Three Minute Recall (leader, season, table) 1/3    Allen clock face with all 12 numbers and set the hands to show 10 past 11.  Yes    Cognitive concerns identified deferred for follow up unless specifically addressed in assessment and plan.    Fall Risk Assessment    Has the patient had two or more falls in the last year or any fall with injury in the last year?  No    Safety Assessment    Throw rugs on floor.  No  Handrails on all stairs.  Yes  Good lighting in all hallways.  Yes  Difficulty hearing.  No  Patient counseled about all safety risks that were identified.    Functional Assessment ADLs    Are there any barriers preventing you from cooking for yourself or meeting nutritional needs?  No.    Are there any barriers preventing you from driving safely or obtaining transportation?  No.    Are there any barriers preventing you from using a telephone or calling for help?  No.    Are there any barriers preventing you from shopping?  No.    Are there any barriers preventing you from taking care of your own finances?  No.    Are there any barriers preventing you from managing your medications?  No.    Are there any barriers preventing you from showering, bathing or dressing yourself?  No.    Are you currently engaging in any exercise or physical activity?  Yes.     What is your perception of your health?  Good.      Health Maintenance Summary                IMM ZOSTER VACCINES Overdue 3/22/2014      Done 1/25/2014 Imm Admin: Zoster Vaccine Live (ZVL) (Zostavax)    MAMMOGRAM Next Due 8/28/2018      Done 8/28/2017 MA-MAMMO SCREENING BILAT W/TOMOSYNTHESIS W/CAD     Patient has more history with this topic...    IMM INFLUENZA Next Due 9/1/2018       Done 8/29/2017 Imm Admin: Influenza Vaccine Adult HD     Patient has more history with this topic...    Annual Wellness Visit Next Due 8/8/2019      Done 8/7/2018 Visit Dx: Medicare annual wellness visit, subsequent     Patient has more history with this topic...    BONE DENSITY Next Due 6/21/2021      Done 6/21/2016 DS-BONE DENSITY STUDY (DEXA)     Patient has more history with this topic...    COLONOSCOPY Next Due 4/1/2025      Done 4/1/2015     IMM DTaP/Tdap/Td Vaccine Next Due 4/7/2025      Done 4/7/2015 Imm Admin: Tdap Vaccine          Patient Care Team:  Anabelle Pryor M.D. as PCP - General        Social History   Substance Use Topics   • Smoking status: Never Smoker   • Smokeless tobacco: Never Used   • Alcohol use 1.2 oz/week     2 Glasses of wine per week     Family History   Problem Relation Age of Onset   • Other Father 69        aneurysm   • Diabetes Mother      She  has a past medical history of Arthritis; ASTHMA; Hypothyroidism (acquired); Osteoporosis; and Pain. She also has no past medical history of Encounter for long-term (current) use of other medications.   Past Surgical History:   Procedure Laterality Date   • SHOULDER DECOMPRESSION ARTHROSCOPIC  10/23/08    Performed by ADDY VILLEGAS at [facility] ORS   • SHOULDER ARTHROSCOPY W/ ROTATOR CUFF REPAIR  10/23/08    Performed by ADDY VILLEGAS at [facility] OR[facility]   • CLAVICLE DISTAL EXCISION  10/23/08    Performed by DADY VILLEGAS at [facility] ORS   • OTHER  2008    sinus surg   • CARPAL TUNNEL REL  2005    right   • BUNIONECTOMY  1999    right   • OTHER  1995    surg for incontinence   • TUBAL LIGATION  1970   • APPENDECTOMY  1952       Exam:   Blood pressure 108/60, pulse 65, temperature 36.7 °C (98 °F), resp. rate 20, height 1.524 m (5'), weight 54.4 kg (120 lb), SpO2 97 %. Body mass index is 23.44 kg/m².    Hearing good.    Dentition good  Alert, oriented in no acute distress.  Eye contact is good, speech goal directed,  affect calm  Constitutional: Alert, no distress.  Skin: Warm, dry, good turgor, no rashes in visible areas.  Eye: Equal, round and reactive, conjunctiva clear, lids normal.  ENMT: Lips without lesions, good dentition, oropharynx clear.  Neck: Trachea midline, no masses, no thyromegaly. No cervical or supraclavicular lymphadenopathy  Respiratory: Unlabored respiratory effort, lungs clear to auscultation, no wheezes, no ronchi.  Cardiovascular: Normal S1, S2, RRR, no murmur, no edema.  Abdomen: Soft, non-tender, no masses, no hepatosplenomegaly.  Psych: Alert and oriented x3, normal affect and mood.        Assessment and Plan. The following treatment and monitoring plan is recommended:    1. Medicare annual wellness visit, subsequent  Routine anticipatory guidance.  No special services needed at this time  - Annual Wellness Visit - Includes PPPS Subsequent ()    2. Hypothyroidism due to acquired atrophy of thyroid  Check thyroid labs and adjust levothyroxine dose as needed  - Annual Wellness Visit - Includes PPPS Subsequent ()  - TSH; Future  - FREE THYROXINE; Future    3. Seronegative erosive rheumatoid arthritis (HCC)  Continue follow-up with rheumatology as scheduled  - Annual Wellness Visit - Includes PPPS Subsequent ()    4. Vitamin D deficiency  Patient is due for a bone density and vitamin D level.  Continue weightbearing exercise  - Annual Wellness Visit - Includes PPPS Subsequent ()  - VITAMIN D,25 HYDROXY; Future  - DS-BONE DENSITY STUDY (DEXA); Future    5. Other neutropenia (HCC)  This is a chronic medical condition that is currently stable  Continue to monitor.  Patient has not had increased infections  - Annual Wellness Visit - Includes PPPS Subsequent ()  - CBC WITH DIFFERENTIAL; Future    6. Screening for diabetes mellitus  Screening labs ordered.  Await results for counseling.    - COMP METABOLIC PANEL; Future    7. Encounter for lipid screening for cardiovascular  disease  Screening labs ordered.  Await results for counseling.    - LIPID PROFILE; Future    8. Need for vaccination  Prescription for Shingrex given as we do not have it in stock  - Zoster Vac Recomb Adjuvanted (SHINGRIX) 50 MCG Recon Susp; 0.5 mL by Intramuscular route Once for 1 dose.  Dispense: 0.5 mL; Refill: 1    9. Encounter for screening mammogram for breast cancer    - MA-SCREEN MAMMO W/CAD-BILAT; Future    10. Osteopenia of multiple sites  Await bone density results  - DS-BONE DENSITY STUDY (DEXA); Future    11. Menopause    - DS-BONE DENSITY STUDY (DEXA); Future          Services suggested: No services needed at this time  Health Care Screening: Age-appropriate preventive services recommended by USPTF and ACIP covered by Medicare were discussed today. Services ordered if indicated and agreed upon by the patient.  Referrals offered: Community-based lifestyle interventions to reduce health risks and promote self-management and wellness, fall prevention, nutrition, physical activity, tobacco-use cessation, weight loss, and mental health services as per orders if indicated.    Discussion today about general wellness and lifestyle habits:    · Prevent falls and reduce trip hazards; Cautioned about securing or removing rugs.  · Have a working fire alarm and carbon monoxide detector;   · Engage in regular physical activity and social activities     Follow-up: Return in about 1 year (around 8/7/2019).

## 2018-08-20 ENCOUNTER — APPOINTMENT (RX ONLY)
Dept: URBAN - METROPOLITAN AREA CLINIC 4 | Facility: CLINIC | Age: 78
Setting detail: DERMATOLOGY
End: 2018-08-20

## 2018-08-20 DIAGNOSIS — D18.0 HEMANGIOMA: ICD-10-CM

## 2018-08-20 DIAGNOSIS — L81.4 OTHER MELANIN HYPERPIGMENTATION: ICD-10-CM

## 2018-08-20 DIAGNOSIS — D22 MELANOCYTIC NEVI: ICD-10-CM

## 2018-08-20 DIAGNOSIS — L82.1 OTHER SEBORRHEIC KERATOSIS: ICD-10-CM

## 2018-08-20 DIAGNOSIS — L57.8 OTHER SKIN CHANGES DUE TO CHRONIC EXPOSURE TO NONIONIZING RADIATION: ICD-10-CM

## 2018-08-20 DIAGNOSIS — L82.0 INFLAMED SEBORRHEIC KERATOSIS: ICD-10-CM

## 2018-08-20 PROBLEM — D18.01 HEMANGIOMA OF SKIN AND SUBCUTANEOUS TISSUE: Status: ACTIVE | Noted: 2018-08-20

## 2018-08-20 PROBLEM — D22.5 MELANOCYTIC NEVI OF TRUNK: Status: ACTIVE | Noted: 2018-08-20

## 2018-08-20 PROCEDURE — ? COUNSELING

## 2018-08-20 PROCEDURE — 99214 OFFICE O/P EST MOD 30 MIN: CPT | Mod: 25

## 2018-08-20 PROCEDURE — ? LIQUID NITROGEN

## 2018-08-20 PROCEDURE — 17110 DESTRUCTION B9 LES UP TO 14: CPT

## 2018-08-20 ASSESSMENT — LOCATION ZONE DERM
LOCATION ZONE: NOSE
LOCATION ZONE: ARM
LOCATION ZONE: FACE
LOCATION ZONE: LEG
LOCATION ZONE: TRUNK

## 2018-08-20 ASSESSMENT — LOCATION DETAILED DESCRIPTION DERM
LOCATION DETAILED: RIGHT CENTRAL EYEBROW
LOCATION DETAILED: LEFT CENTRAL BUCCAL CHEEK
LOCATION DETAILED: LEFT INFERIOR CENTRAL MALAR CHEEK
LOCATION DETAILED: RIGHT PROXIMAL DORSAL FOREARM
LOCATION DETAILED: RIGHT SUPERIOR TEMPLE
LOCATION DETAILED: RIGHT SUPERIOR LATERAL MALAR CHEEK
LOCATION DETAILED: LEFT SUPERIOR LATERAL MALAR CHEEK
LOCATION DETAILED: LEFT PROXIMAL DORSAL FOREARM
LOCATION DETAILED: LEFT CENTRAL MALAR CHEEK
LOCATION DETAILED: LEFT INFERIOR LATERAL FOREHEAD
LOCATION DETAILED: RIGHT CENTRAL MALAR CHEEK
LOCATION DETAILED: LEFT ANTERIOR DISTAL THIGH
LOCATION DETAILED: RIGHT SUPERIOR MEDIAL UPPER BACK
LOCATION DETAILED: RIGHT ANTERIOR DISTAL THIGH
LOCATION DETAILED: LEFT MID TEMPLE
LOCATION DETAILED: LEFT MEDIAL SUPERIOR CHEST
LOCATION DETAILED: RIGHT MID TEMPLE
LOCATION DETAILED: LEFT LATERAL MALAR CHEEK
LOCATION DETAILED: RIGHT INFERIOR UPPER BACK
LOCATION DETAILED: RIGHT MID-UPPER BACK
LOCATION DETAILED: NASAL ROOT
LOCATION DETAILED: LEFT ANTERIOR PROXIMAL UPPER ARM
LOCATION DETAILED: RIGHT ANTERIOR PROXIMAL UPPER ARM

## 2018-08-20 ASSESSMENT — LOCATION SIMPLE DESCRIPTION DERM
LOCATION SIMPLE: CHEST
LOCATION SIMPLE: LEFT FOREHEAD
LOCATION SIMPLE: RIGHT UPPER ARM
LOCATION SIMPLE: RIGHT CHEEK
LOCATION SIMPLE: LEFT UPPER ARM
LOCATION SIMPLE: RIGHT EYEBROW
LOCATION SIMPLE: RIGHT FOREARM
LOCATION SIMPLE: RIGHT TEMPLE
LOCATION SIMPLE: RIGHT UPPER BACK
LOCATION SIMPLE: LEFT CHEEK
LOCATION SIMPLE: LEFT THIGH
LOCATION SIMPLE: NOSE
LOCATION SIMPLE: LEFT TEMPLE
LOCATION SIMPLE: RIGHT THIGH
LOCATION SIMPLE: LEFT FOREARM

## 2018-08-20 NOTE — PROCEDURE: LIQUID NITROGEN
Aperture Size (Optional): C
Include Z78.9 (Other Specified Conditions Influencing Health Status) As An Associated Diagnosis?: No
Medical Necessity Information: It is in your best interest to select a reason for this procedure from the list below. All of these items fulfill various CMS LCD requirements except the new and changing color options.
Detail Level: Detailed
Consent: The patient's consent was obtained including but not limited to risks of crusting, scabbing, blistering, scarring, darker or lighter pigmentary change, recurrence, incomplete removal and infection.
Post-Care Instructions: I reviewed with the patient in detail post-care instructions. Patient is to wear sunprotection, and avoid picking at any of the treated lesions. Pt may apply Vaseline to crusted or scabbing areas.
Medical Necessity Clause: This procedure was medically necessary because the lesions that were treated were:
Number Of Freeze-Thaw Cycles: 1 freeze-thaw cycle
Duration Of Freeze Thaw-Cycle (Seconds): 3

## 2018-08-29 ENCOUNTER — HOSPITAL ENCOUNTER (OUTPATIENT)
Dept: RADIOLOGY | Facility: MEDICAL CENTER | Age: 78
End: 2018-08-29
Attending: FAMILY MEDICINE
Payer: MEDICARE

## 2018-08-29 DIAGNOSIS — E55.9 VITAMIN D DEFICIENCY: ICD-10-CM

## 2018-08-29 DIAGNOSIS — M85.89 OSTEOPENIA OF MULTIPLE SITES: ICD-10-CM

## 2018-08-29 DIAGNOSIS — Z78.0 MENOPAUSE: ICD-10-CM

## 2018-08-29 DIAGNOSIS — Z12.31 ENCOUNTER FOR SCREENING MAMMOGRAM FOR BREAST CANCER: ICD-10-CM

## 2018-08-29 PROCEDURE — 77080 DXA BONE DENSITY AXIAL: CPT

## 2018-08-29 PROCEDURE — 77067 SCR MAMMO BI INCL CAD: CPT

## 2019-06-13 ENCOUNTER — APPOINTMENT (RX ONLY)
Dept: URBAN - METROPOLITAN AREA CLINIC 4 | Facility: CLINIC | Age: 79
Setting detail: DERMATOLOGY
End: 2019-06-13

## 2019-06-13 DIAGNOSIS — L57.0 ACTINIC KERATOSIS: ICD-10-CM

## 2019-06-13 DIAGNOSIS — L81.4 OTHER MELANIN HYPERPIGMENTATION: ICD-10-CM

## 2019-06-13 DIAGNOSIS — L57.8 OTHER SKIN CHANGES DUE TO CHRONIC EXPOSURE TO NONIONIZING RADIATION: ICD-10-CM

## 2019-06-13 DIAGNOSIS — D22 MELANOCYTIC NEVI: ICD-10-CM

## 2019-06-13 DIAGNOSIS — D18.0 HEMANGIOMA: ICD-10-CM

## 2019-06-13 DIAGNOSIS — L82.0 INFLAMED SEBORRHEIC KERATOSIS: ICD-10-CM

## 2019-06-13 DIAGNOSIS — L82.1 OTHER SEBORRHEIC KERATOSIS: ICD-10-CM

## 2019-06-13 PROBLEM — J45.909 UNSPECIFIED ASTHMA, UNCOMPLICATED: Status: ACTIVE | Noted: 2019-06-13

## 2019-06-13 PROBLEM — D22.5 MELANOCYTIC NEVI OF TRUNK: Status: ACTIVE | Noted: 2019-06-13

## 2019-06-13 PROBLEM — D18.01 HEMANGIOMA OF SKIN AND SUBCUTANEOUS TISSUE: Status: ACTIVE | Noted: 2019-06-13

## 2019-06-13 PROCEDURE — 17003 DESTRUCT PREMALG LES 2-14: CPT | Mod: 59

## 2019-06-13 PROCEDURE — 17000 DESTRUCT PREMALG LESION: CPT | Mod: 59

## 2019-06-13 PROCEDURE — ? COUNSELING

## 2019-06-13 PROCEDURE — ? LIQUID NITROGEN

## 2019-06-13 PROCEDURE — 99214 OFFICE O/P EST MOD 30 MIN: CPT | Mod: 25

## 2019-06-13 PROCEDURE — 17110 DESTRUCTION B9 LES UP TO 14: CPT

## 2019-06-13 ASSESSMENT — LOCATION DETAILED DESCRIPTION DERM
LOCATION DETAILED: RIGHT CENTRAL BUCCAL CHEEK
LOCATION DETAILED: RIGHT SUPERIOR CENTRAL BUCCAL CHEEK
LOCATION DETAILED: LEFT SUPERIOR LATERAL BUCCAL CHEEK
LOCATION DETAILED: LEFT INFERIOR CENTRAL MALAR CHEEK
LOCATION DETAILED: LEFT SUPERIOR UPPER BACK
LOCATION DETAILED: LEFT DISTAL DORSAL FOREARM
LOCATION DETAILED: RIGHT RADIAL DORSAL HAND
LOCATION DETAILED: RIGHT PROXIMAL DORSAL FOREARM
LOCATION DETAILED: LEFT RADIAL DORSAL HAND
LOCATION DETAILED: LEFT MEDIAL SUPERIOR CHEST
LOCATION DETAILED: RIGHT INFERIOR CENTRAL MALAR CHEEK
LOCATION DETAILED: RIGHT SUPERIOR MEDIAL UPPER BACK
LOCATION DETAILED: RIGHT MID-UPPER BACK
LOCATION DETAILED: RIGHT VENTRAL PROXIMAL FOREARM
LOCATION DETAILED: LEFT INFERIOR LATERAL MALAR CHEEK
LOCATION DETAILED: LEFT SUPERIOR MEDIAL UPPER BACK

## 2019-06-13 ASSESSMENT — LOCATION SIMPLE DESCRIPTION DERM
LOCATION SIMPLE: LEFT UPPER BACK
LOCATION SIMPLE: LEFT FOREARM
LOCATION SIMPLE: RIGHT FOREARM
LOCATION SIMPLE: LEFT CHEEK
LOCATION SIMPLE: RIGHT UPPER BACK
LOCATION SIMPLE: RIGHT HAND
LOCATION SIMPLE: CHEST
LOCATION SIMPLE: RIGHT CHEEK
LOCATION SIMPLE: LEFT HAND

## 2019-06-13 ASSESSMENT — LOCATION ZONE DERM
LOCATION ZONE: TRUNK
LOCATION ZONE: FACE
LOCATION ZONE: HAND
LOCATION ZONE: ARM

## 2019-06-13 NOTE — PROCEDURE: LIQUID NITROGEN
Add 52 Modifier (Optional): no
Consent: The patient's consent was obtained including but not limited to risks of crusting, scabbing, blistering, scarring, darker or lighter pigmentary change, recurrence, incomplete removal and infection.
Aperture Size (Optional): C
Detail Level: Detailed
Duration Of Freeze Thaw-Cycle (Seconds): 3
Post-Care Instructions: I reviewed with the patient in detail post-care instructions. Patient is to wear sunprotection, and avoid picking at any of the treated lesions. Pt may apply Vaseline to crusted or scabbing areas.
Number Of Freeze-Thaw Cycles: 1 freeze-thaw cycle
Medical Necessity Information: It is in your best interest to select a reason for this procedure from the list below. All of these items fulfill various CMS LCD requirements except the new and changing color options.
Medical Necessity Clause: This procedure was medically necessary because the lesions that were treated were:
Number Of Freeze-Thaw Cycles: 2 freeze-thaw cycles
Detail Level: Simple

## 2019-08-05 ENCOUNTER — TELEPHONE (OUTPATIENT)
Dept: MEDICAL GROUP | Facility: LAB | Age: 79
End: 2019-08-05

## 2019-08-05 NOTE — TELEPHONE ENCOUNTER
ANNUAL WELLNESS VISIT PRE-VISIT PLANNING WITHOUT OUTREACH    1.  Reviewed note from last office visit with PCP: YES    2.  If any orders were placed at last visit, do we have Results/Consult Notes?        •  Labs - Labs ordered, NOT completed. Patient advised to complete prior to next appointment.  Note: If patient appointment is for lab review and patient did not complete labs, check with provider if OK to reschedule patient until labs completed.       •  Imaging - Imaging ordered, completed and results are in chart.       •  Referrals - No referrals were ordered at last office visit.    3.  Immunizations were updated in Epic using WebIZ?: Epic matches WebIZ       •  WebIZ Recommendations: HEPATITIS B and SHINGRIX (Shingles)        •  Is patient due for Tdap? NO       •  Is patient due for Shingrix? NO     4.  Patient is due for the following Health Maintenance Topics:   Health Maintenance Due   Topic Date Due   • Annual Wellness Visit  1940   • IMM ZOSTER VACCINES (2 of 3) 03/22/2014   • MAMMOGRAM  08/29/2019       - Patient has completed PNEUMOVAX (PPSV23), PREVNAR (PCV13)  and TDAP Immunization(s) per WebIZ. Chart has been updated.    5.  Reviewed/Updated the following with patient:       •   Preferred Pharmacy? YES       •   Preferred Lab? YES       •   Preferred Communication? YES       •   Allergies? YES       •   Medications? NO       •   Social History? NO       •   Family History (document living status of immediate family members and if + hx of  cancer, diabetes, hypertension, hyperlipidemia, heart attack, stroke) NO    6.  Care Team Updated:       •   DME Company (gait device, O2, CPAP, etc.): NO       •   Other Specialists (eye doctor, derm, GYN, cardiology, endo, etc): NO    7. Orders for overdue Health Maintenance topics pended in Pre-Charting? N\A    8.  Patient has the following Care Path diagnoses on Problem List:  NONE    9.  Patient was advised: “This is a free wellness visit. The provider  will screen for medical conditions to help you stay healthy. If you have other concerns to address you may be asked to discuss these at a separate visit or there may be an additional fee.”     10.  Patient was informed to arrive 15 min prior to their scheduled appointment and bring in their medication bottles.

## 2019-08-12 ENCOUNTER — OFFICE VISIT (OUTPATIENT)
Dept: MEDICAL GROUP | Facility: LAB | Age: 79
End: 2019-08-12
Payer: MEDICARE

## 2019-08-12 VITALS
SYSTOLIC BLOOD PRESSURE: 112 MMHG | OXYGEN SATURATION: 96 % | HEIGHT: 60 IN | DIASTOLIC BLOOD PRESSURE: 54 MMHG | BODY MASS INDEX: 20.81 KG/M2 | HEART RATE: 54 BPM | WEIGHT: 106 LBS | RESPIRATION RATE: 14 BRPM | TEMPERATURE: 98.3 F

## 2019-08-12 DIAGNOSIS — M87.180: ICD-10-CM

## 2019-08-12 DIAGNOSIS — F51.01 PRIMARY INSOMNIA: ICD-10-CM

## 2019-08-12 DIAGNOSIS — Z00.00 MEDICARE ANNUAL WELLNESS VISIT, SUBSEQUENT: ICD-10-CM

## 2019-08-12 DIAGNOSIS — T45.8X5A: ICD-10-CM

## 2019-08-12 DIAGNOSIS — Z12.31 ENCOUNTER FOR SCREENING MAMMOGRAM FOR BREAST CANCER: ICD-10-CM

## 2019-08-12 DIAGNOSIS — Z13.1 SCREENING FOR DIABETES MELLITUS: ICD-10-CM

## 2019-08-12 DIAGNOSIS — E03.4 HYPOTHYROIDISM DUE TO ACQUIRED ATROPHY OF THYROID: ICD-10-CM

## 2019-08-12 DIAGNOSIS — M85.89 OSTEOPENIA OF MULTIPLE SITES: ICD-10-CM

## 2019-08-12 DIAGNOSIS — M06.00 SERONEGATIVE EROSIVE RHEUMATOID ARTHRITIS (HCC): ICD-10-CM

## 2019-08-12 DIAGNOSIS — E55.9 VITAMIN D DEFICIENCY: ICD-10-CM

## 2019-08-12 DIAGNOSIS — D70.8 OTHER NEUTROPENIA (HCC): ICD-10-CM

## 2019-08-12 PROCEDURE — G0439 PPPS, SUBSEQ VISIT: HCPCS | Performed by: FAMILY MEDICINE

## 2019-08-12 RX ORDER — TRAZODONE HYDROCHLORIDE 50 MG/1
50 TABLET ORAL
Qty: 90 TAB | Refills: 1 | Status: SHIPPED | OUTPATIENT
Start: 2019-08-12 | End: 2020-01-13 | Stop reason: SDUPTHER

## 2019-08-12 RX ORDER — TRAZODONE HYDROCHLORIDE 50 MG/1
50 TABLET ORAL
Qty: 90 TAB | Refills: 1 | Status: SHIPPED | OUTPATIENT
Start: 2019-08-12 | End: 2019-08-12 | Stop reason: SDUPTHER

## 2019-08-12 ASSESSMENT — ACTIVITIES OF DAILY LIVING (ADL): BATHING_REQUIRES_ASSISTANCE: 0

## 2019-08-12 ASSESSMENT — PATIENT HEALTH QUESTIONNAIRE - PHQ9: CLINICAL INTERPRETATION OF PHQ2 SCORE: 0

## 2019-08-12 ASSESSMENT — ENCOUNTER SYMPTOMS: GENERAL WELL-BEING: EXCELLENT

## 2019-08-12 NOTE — PROGRESS NOTES
Chief Complaint   Patient presents with   • Medicare Annual Wellness         HPI:  Cristin is a 79 y.o. here for Medicare Annual Wellness Visit        Patient Active Problem List    Diagnosis Date Noted   • Medicare annual wellness visit, subsequent 08/12/2019   • Primary insomnia 08/12/2019   • Other neutropenia (HCC) 08/07/2018   • Seronegative erosive rheumatoid arthritis (HCC) 04/03/2018   • Oral bisphosphonate-related jaw necrosis (HCC) 11/18/2016   • Vitamin D deficiency 11/18/2016   • Osteopenia of multiple sites 05/04/2016   • Hypothyroidism due to acquired atrophy of thyroid 05/04/2016       Current Outpatient Medications   Medication Sig Dispense Refill   • traZODone (DESYREL) 50 MG Tab Take 1 Tab by mouth every bedtime. 90 Tab 1   • levothyroxine (SYNTHROID) 50 MCG Tab TAKE 1 TABLET ONCE DAILY INTHE MORNING ON AN EMPTY    STOMACH 90 Tab 3   • vitamin D (CHOLECALCIFEROL) 1000 UNIT Tab Take 1,000 Units by mouth every day.     • MULTIVITAMINS PO every day.        No current facility-administered medications for this visit.         Patient is taking medications as noted in medication list.  Current supplements as per medication list.     Allergies: Boniva [ibandronic acid]; Clindamycin; Ibuprofen; Penicillins; and Tape    Current social contact/activities: Travel, goes to dinner with friends     Is patient current with immunizations? No, due for SHINGRIX (Shingles). Patient is interested in receiving NONE today.    She  reports that she has never smoked. She has never used smokeless tobacco. She reports that she drinks about 1.2 oz of alcohol per week. She reports that she does not use drugs.  Counseling given: Not Answered        DPA/Advanced directive: Patient has Advanced Directive, but it is not on file. Instructed to bring in a copy to scan into their chart.    ROS:    Gait: Uses no assistive device   Ostomy: No   Other tubes: No   Amputations: No   Chronic oxygen use No   Last eye exam 2019   Wears  hearing aids: No   : Denies any urinary leakage during the last 6 months          Depression Screening    Little interest or pleasure in doing things?  0 - not at all  Feeling down, depressed, or hopeless? 0 - not at all  Patient Health Questionnaire Score: 0    If depressive symptoms identified deferred to follow up visit unless specifically addressed in assessment and plan.    Interpretation of PHQ-9 Total Score   Score Severity   1-4 No Depression   5-9 Mild Depression   10-14 Moderate Depression   15-19 Moderately Severe Depression   20-27 Severe Depression    Screening for Cognitive Impairment    Three Minute Recall (village, kitchen, baby)  3/3    Allen clock face with all 12 numbers and set the hands to show 10 past 10.  Yes 5/5  If cognitive concerns identified, deferred for follow up unless specifically addressed in assessment and plan.    Fall Risk Assessment    Has the patient had two or more falls in the last year or any fall with injury in the last year?  No  If fall risk identified, deferred for follow up unless specifically addressed in assessment and plan.    Safety Assessment    Throw rugs on floor.  Yes  Handrails on all stairs.  Yes  Good lighting in all hallways.  Yes  Difficulty hearing.  No  Patient counseled about all safety risks that were identified.    Functional Assessment ADLs    Are there any barriers preventing you from cooking for yourself or meeting nutritional needs?  No.    Are there any barriers preventing you from driving safely or obtaining transportation?  No.    Are there any barriers preventing you from using a telephone or calling for help?  No.    Are there any barriers preventing you from shopping?  No.    Are there any barriers preventing you from taking care of your own finances?  No.    Are there any barriers preventing you from managing your medications?  No.    Are there any barriers preventing you from showering, bathing or dressing yourself?  No.    Are you  currently engaging in any exercise or physical activity?  Yes.  walking  What is your perception of your health?  Excellent.    Health Maintenance Summary                MAMMOGRAM Next Due 8/29/2019      Done 8/29/2018 MA-MAMMO SCREENING BILAT W/TOMOSYNTHESIS W/CAD     Patient has more history with this topic...    IMM INFLUENZA Next Due 9/1/2019      Done 9/11/2018 Imm Admin: Influenza Vaccine Adult HD     Patient has more history with this topic...    IMM ZOSTER VACCINES Next Due 9/11/2019      Done 7/17/2019 Imm Admin: Recombinant Zoster Vaccine (RZV) (Shingrix)     Patient has more history with this topic...    Annual Wellness Visit Next Due 8/12/2020      Done 8/12/2019 SUBSEQUENT ANNUAL WELLNESS VISIT-INCLUDES PPPS ()     Patient has more history with this topic...    BONE DENSITY Next Due 8/29/2023      Done 8/29/2018 DS-BONE DENSITY STUDY (DEXA)     Patient has more history with this topic...    COLONOSCOPY Next Due 4/1/2025      Done 4/1/2015     IMM DTaP/Tdap/Td Vaccine Next Due 4/7/2025      Done 4/7/2015 Imm Admin: Tdap Vaccine          Patient Care Team:  Anabelle Pryor M.D. as PCP - General    Social History     Tobacco Use   • Smoking status: Never Smoker   • Smokeless tobacco: Never Used   Substance Use Topics   • Alcohol use: Yes     Alcohol/week: 1.2 oz     Types: 2 Glasses of wine per week   • Drug use: No     Family History   Problem Relation Age of Onset   • Other Father 69        aneurysm   • Diabetes Mother      She  has a past medical history of Arthritis, ASTHMA, Hypothyroidism (acquired), Osteoporosis, and Pain. She also has no past medical history of Encounter for long-term (current) use of other medications.   Past Surgical History:   Procedure Laterality Date   • SHOULDER DECOMPRESSION ARTHROSCOPIC  10/23/08    Performed by ADDY VILLEGAS at [facility]   • SHOULDER ARTHROSCOPY W/ ROTATOR CUFF REPAIR  10/23/08    Performed by ADDY VILLEGAS at [facility]   •  "CLAVICLE DISTAL EXCISION  10/23/08    Performed by ADDY VILLEGAS at SUR[facility]ERY [facility]   • OTHER  2008    sinus surg   • CARPAL TUNNEL REL  2005    right   • BUNIONECTOMY  1999    right   • OTHER  1995    surg for incontinence   • TUBAL LIGATION  1970   • APPENDECTOMY  1952           Exam:     /54 (BP Location: Left arm, Patient Position: Sitting, BP Cuff Size: Adult)   Pulse (!) 54   Temp 36.8 °C (98.3 °F) (Temporal)   Resp 14   Ht 1.53 m (5' 0.24\")   Wt 48.1 kg (106 lb)   SpO2 96%  Body mass index is 20.54 kg/m².    Hearing good.    Dentition good  Alert, oriented in no acute distress.  Eye contact is good, speech goal directed, affect calm  Constitutional: Alert, no distress.  Skin: Warm, dry, good turgor, no rashes in visible areas.  Eye: Equal, round and reactive, conjunctiva clear, lids normal.  ENMT: Lips without lesions, good dentition, oropharynx clear.  Neck: Trachea midline, no masses, no thyromegaly. No cervical or supraclavicular lymphadenopathy  Respiratory: Unlabored respiratory effort, lungs clear to auscultation, no wheezes, no ronchi.  Cardiovascular: Normal S1, S2, RRR, no murmur, no edema.  Abdomen: Soft, non-tender, no masses, no hepatosplenomegaly.  Psych: Alert and oriented x3, normal affect and mood.        Assessment and Plan. The following treatment and monitoring plan is recommended:    1. Medicare annual wellness visit, subsequent  Routine anticipatory guidance.  No special services needed at this time  - Subsequent Annual Wellness Visit - Includes PPPS ()    2. Hypothyroidism due to acquired atrophy of thyroid  Check thyroid labs and adjust levothyroxine dose as needed  - Subsequent Annual Wellness Visit - Includes PPPS ()  - TSH; Future  - FREE THYROXINE; Future    3. Osteopenia of multiple sites  Continue weightbearing exercise, calcium and vitamin D supplementation  - Subsequent Annual Wellness Visit - Includes PPPS ()    4. Other neutropenia " (HCC)  Recheck CBC.  Patient is not having recurrent infections  - Subsequent Annual Wellness Visit - Includes PPPS ()  - CBC WITH DIFFERENTIAL; Future    5. Seronegative erosive rheumatoid arthritis (HCC)  Continue follow-up with rheumatology  - Subsequent Annual Wellness Visit - Includes PPPS ()    6. Vitamin D deficiency  Check vitamin D level and adjust supplementation as needed  - Subsequent Annual Wellness Visit - Includes PPPS ()    7. Primary insomnia  Trazodone 50 mg nightly.  Adjust dose as needed  - Subsequent Annual Wellness Visit - Includes PPPS ()  - traZODone (DESYREL) 50 MG Tab; Take 1 Tab by mouth every bedtime.  Dispense: 90 Tab; Refill: 1    8. Encounter for screening mammogram for breast cancer  Schedule mammogram  - MA-SCREEN MAMMO W/CAD-BILAT; Future  - Subsequent Annual Wellness Visit - Includes PPPS ()    9. Oral bisphosphonate-related jaw necrosis (HCC)  Avoid bisophosphate  - Subsequent Annual Wellness Visit - Includes PPPS ()    10. Screening for diabetes mellitus  Screening labs ordered.  Await results for counseling.    - Comp Metabolic Panel; Future      Services suggested: No services needed at this time  Health Care Screening recommendations as per orders if indicated.  Referrals offered: PT/OT/Nutrition counseling/Behavioral Health/Smoking cessation as per orders if indicated.    Discussion today about general wellness and lifestyle habits:    · Prevent falls and reduce trip hazards; Cautioned about securing or removing rugs.  · Have a working fire alarm and carbon monoxide detector;   · Engage in regular physical activity and social activities       Follow-up: Return in about 1 year (around 8/12/2020).

## 2019-08-12 NOTE — ASSESSMENT & PLAN NOTE
This is chronic. Patient has difficulty with sleeping. Primarily secondary to mind racing. Difficulty falling asleep and staying asleep. Patient uses electronics before bed. Does not drink beverages with caffeine into the afternoon. Not exercising first thing in the morning.

## 2019-08-28 RX ORDER — LEVOTHYROXINE SODIUM 0.05 MG/1
TABLET ORAL
Qty: 90 TAB | Refills: 3 | Status: SHIPPED | OUTPATIENT
Start: 2019-08-28 | End: 2020-01-13 | Stop reason: SDUPTHER

## 2019-08-28 NOTE — TELEPHONE ENCOUNTER
Was the patient seen in the last year in this department? Yes LOV 8/12/19    Does patient have an active prescription for medications requested? No     Received Request Via: Patient     Anabelle,   Could you refill my Levothyroxine 50MCG at the [facility] pharmacy on [address]. Kemal said they didn't receive a request for a prescription. I only have 12 tablets left. Thanks so much!!   Amrita Kruse

## 2019-08-30 ENCOUNTER — HOSPITAL ENCOUNTER (OUTPATIENT)
Dept: RADIOLOGY | Facility: MEDICAL CENTER | Age: 79
End: 2019-08-30
Attending: FAMILY MEDICINE
Payer: MEDICARE

## 2019-08-30 DIAGNOSIS — Z12.31 ENCOUNTER FOR SCREENING MAMMOGRAM FOR BREAST CANCER: ICD-10-CM

## 2019-08-30 PROCEDURE — 77063 BREAST TOMOSYNTHESIS BI: CPT

## 2019-12-12 ENCOUNTER — APPOINTMENT (RX ONLY)
Dept: URBAN - METROPOLITAN AREA CLINIC 4 | Facility: CLINIC | Age: 79
Setting detail: DERMATOLOGY
End: 2019-12-12

## 2019-12-12 DIAGNOSIS — L57.8 OTHER SKIN CHANGES DUE TO CHRONIC EXPOSURE TO NONIONIZING RADIATION: ICD-10-CM

## 2019-12-12 DIAGNOSIS — D18.0 HEMANGIOMA: ICD-10-CM

## 2019-12-12 DIAGNOSIS — D22 MELANOCYTIC NEVI: ICD-10-CM

## 2019-12-12 DIAGNOSIS — L82.1 OTHER SEBORRHEIC KERATOSIS: ICD-10-CM

## 2019-12-12 DIAGNOSIS — L81.4 OTHER MELANIN HYPERPIGMENTATION: ICD-10-CM

## 2019-12-12 PROBLEM — D18.01 HEMANGIOMA OF SKIN AND SUBCUTANEOUS TISSUE: Status: ACTIVE | Noted: 2019-12-12

## 2019-12-12 PROBLEM — D22.5 MELANOCYTIC NEVI OF TRUNK: Status: ACTIVE | Noted: 2019-12-12

## 2019-12-12 PROCEDURE — 99213 OFFICE O/P EST LOW 20 MIN: CPT

## 2019-12-12 PROCEDURE — ? COUNSELING

## 2019-12-12 ASSESSMENT — LOCATION DETAILED DESCRIPTION DERM
LOCATION DETAILED: LEFT INFERIOR CENTRAL MALAR CHEEK
LOCATION DETAILED: RIGHT SUPERIOR MEDIAL UPPER BACK
LOCATION DETAILED: RIGHT MID-UPPER BACK
LOCATION DETAILED: LEFT RADIAL DORSAL HAND
LOCATION DETAILED: RIGHT PROXIMAL DORSAL FOREARM
LOCATION DETAILED: RIGHT RADIAL DORSAL HAND
LOCATION DETAILED: RIGHT INFERIOR CENTRAL MALAR CHEEK
LOCATION DETAILED: LEFT SUPERIOR UPPER BACK
LOCATION DETAILED: LEFT DISTAL DORSAL FOREARM
LOCATION DETAILED: LEFT SUPERIOR MEDIAL UPPER BACK
LOCATION DETAILED: LEFT MEDIAL SUPERIOR CHEST

## 2019-12-12 ASSESSMENT — LOCATION SIMPLE DESCRIPTION DERM
LOCATION SIMPLE: LEFT HAND
LOCATION SIMPLE: LEFT CHEEK
LOCATION SIMPLE: RIGHT UPPER BACK
LOCATION SIMPLE: RIGHT CHEEK
LOCATION SIMPLE: LEFT UPPER BACK
LOCATION SIMPLE: CHEST
LOCATION SIMPLE: RIGHT HAND
LOCATION SIMPLE: RIGHT FOREARM
LOCATION SIMPLE: LEFT FOREARM

## 2019-12-12 ASSESSMENT — LOCATION ZONE DERM
LOCATION ZONE: TRUNK
LOCATION ZONE: ARM
LOCATION ZONE: FACE
LOCATION ZONE: HAND

## 2020-01-13 DIAGNOSIS — F51.01 PRIMARY INSOMNIA: ICD-10-CM

## 2020-01-13 RX ORDER — LEVOTHYROXINE SODIUM 0.05 MG/1
TABLET ORAL
Qty: 90 TAB | Refills: 0 | Status: SHIPPED | OUTPATIENT
Start: 2020-01-13 | End: 2020-05-30 | Stop reason: SDUPTHER

## 2020-01-13 RX ORDER — TRAZODONE HYDROCHLORIDE 50 MG/1
50 TABLET ORAL
Qty: 90 TAB | Refills: 1 | Status: SHIPPED | OUTPATIENT
Start: 2020-01-13 | End: 2020-08-09 | Stop reason: SDUPTHER

## 2020-01-14 NOTE — TELEPHONE ENCOUNTER
Was the patient seen in the last year in this department? Yes  08/12/2019    Does patient have an active prescription for medications requested? Yes    Received Request Via: Patient     levothyroxine (SYNTHROID) 50 MCG Tab    traZODone (DESYREL) 50 MG Tab

## 2020-01-21 ENCOUNTER — PATIENT MESSAGE (OUTPATIENT)
Dept: MEDICAL GROUP | Facility: LAB | Age: 80
End: 2020-01-21

## 2020-06-01 NOTE — TELEPHONE ENCOUNTER
Received request via: Patient    Was the patient seen in the last year in this department? Yes LOV 8/12/2019    Does the patient have an active prescription (recently filled or refills available) for medication(s) requested? No

## 2020-06-02 RX ORDER — LEVOTHYROXINE SODIUM 0.05 MG/1
TABLET ORAL
Qty: 90 TAB | Refills: 0 | Status: SHIPPED | OUTPATIENT
Start: 2020-06-02 | End: 2020-09-21 | Stop reason: SDUPTHER

## 2020-06-11 ENCOUNTER — APPOINTMENT (RX ONLY)
Dept: URBAN - METROPOLITAN AREA CLINIC 4 | Facility: CLINIC | Age: 80
Setting detail: DERMATOLOGY
End: 2020-06-11

## 2020-06-11 DIAGNOSIS — L82.1 OTHER SEBORRHEIC KERATOSIS: ICD-10-CM

## 2020-06-11 DIAGNOSIS — L57.8 OTHER SKIN CHANGES DUE TO CHRONIC EXPOSURE TO NONIONIZING RADIATION: ICD-10-CM

## 2020-06-11 DIAGNOSIS — D18.0 HEMANGIOMA: ICD-10-CM

## 2020-06-11 DIAGNOSIS — L81.4 OTHER MELANIN HYPERPIGMENTATION: ICD-10-CM

## 2020-06-11 DIAGNOSIS — D22 MELANOCYTIC NEVI: ICD-10-CM

## 2020-06-11 PROBLEM — D22.5 MELANOCYTIC NEVI OF TRUNK: Status: ACTIVE | Noted: 2020-06-11

## 2020-06-11 PROBLEM — D18.01 HEMANGIOMA OF SKIN AND SUBCUTANEOUS TISSUE: Status: ACTIVE | Noted: 2020-06-11

## 2020-06-11 PROCEDURE — ? COUNSELING

## 2020-06-11 PROCEDURE — 99213 OFFICE O/P EST LOW 20 MIN: CPT

## 2020-06-11 ASSESSMENT — LOCATION DETAILED DESCRIPTION DERM
LOCATION DETAILED: RIGHT DISTAL DORSAL FOREARM
LOCATION DETAILED: LEFT INFERIOR CENTRAL MALAR CHEEK
LOCATION DETAILED: RIGHT SUPERIOR MEDIAL UPPER BACK
LOCATION DETAILED: RIGHT MID-UPPER BACK
LOCATION DETAILED: LEFT MEDIAL SUPERIOR CHEST
LOCATION DETAILED: RIGHT ANTERIOR DISTAL THIGH
LOCATION DETAILED: LEFT ANTERIOR DISTAL THIGH
LOCATION DETAILED: RIGHT CENTRAL MALAR CHEEK
LOCATION DETAILED: RIGHT INFERIOR UPPER BACK
LOCATION DETAILED: LEFT ANTERIOR PROXIMAL UPPER ARM
LOCATION DETAILED: RIGHT ANTERIOR PROXIMAL UPPER ARM
LOCATION DETAILED: LEFT DISTAL DORSAL FOREARM

## 2020-06-11 ASSESSMENT — LOCATION ZONE DERM
LOCATION ZONE: ARM
LOCATION ZONE: TRUNK
LOCATION ZONE: LEG
LOCATION ZONE: FACE

## 2020-06-11 ASSESSMENT — LOCATION SIMPLE DESCRIPTION DERM
LOCATION SIMPLE: RIGHT UPPER BACK
LOCATION SIMPLE: LEFT UPPER ARM
LOCATION SIMPLE: RIGHT UPPER ARM
LOCATION SIMPLE: RIGHT CHEEK
LOCATION SIMPLE: LEFT FOREARM
LOCATION SIMPLE: CHEST
LOCATION SIMPLE: RIGHT FOREARM
LOCATION SIMPLE: RIGHT THIGH
LOCATION SIMPLE: LEFT CHEEK
LOCATION SIMPLE: LEFT THIGH

## 2020-06-11 NOTE — HPI: FULL BODY SKIN EXAMINATION
What Is The Reason For Today's Visit?: Full Body Skin Examination
What Is The Reason For Today's Visit? (Being Monitored For X): concerning skin lesions on an annual basis
declines

## 2020-07-30 ENCOUNTER — APPOINTMENT (OUTPATIENT)
Dept: MEDICAL GROUP | Facility: LAB | Age: 80
End: 2020-07-30
Payer: MEDICARE

## 2020-08-13 ENCOUNTER — OFFICE VISIT (OUTPATIENT)
Dept: MEDICAL GROUP | Facility: LAB | Age: 80
End: 2020-08-13
Payer: MEDICARE

## 2020-08-13 VITALS
BODY MASS INDEX: 19.63 KG/M2 | OXYGEN SATURATION: 98 % | DIASTOLIC BLOOD PRESSURE: 50 MMHG | HEART RATE: 72 BPM | HEIGHT: 60 IN | WEIGHT: 100 LBS | RESPIRATION RATE: 16 BRPM | TEMPERATURE: 98.2 F | SYSTOLIC BLOOD PRESSURE: 104 MMHG

## 2020-08-13 DIAGNOSIS — Z01.84 IMMUNITY STATUS TESTING: ICD-10-CM

## 2020-08-13 DIAGNOSIS — R41.3 MEMORY CHANGES: ICD-10-CM

## 2020-08-13 DIAGNOSIS — E03.4 HYPOTHYROIDISM DUE TO ACQUIRED ATROPHY OF THYROID: ICD-10-CM

## 2020-08-13 DIAGNOSIS — Z12.31 ENCOUNTER FOR SCREENING MAMMOGRAM FOR BREAST CANCER: ICD-10-CM

## 2020-08-13 DIAGNOSIS — Z78.0 POSTMENOPAUSAL: ICD-10-CM

## 2020-08-13 DIAGNOSIS — E55.9 VITAMIN D DEFICIENCY: ICD-10-CM

## 2020-08-13 DIAGNOSIS — M85.89 OSTEOPENIA OF MULTIPLE SITES: ICD-10-CM

## 2020-08-13 DIAGNOSIS — D70.8 OTHER NEUTROPENIA (HCC): ICD-10-CM

## 2020-08-13 PROCEDURE — 99214 OFFICE O/P EST MOD 30 MIN: CPT | Performed by: FAMILY MEDICINE

## 2020-08-13 ASSESSMENT — PATIENT HEALTH QUESTIONNAIRE - PHQ9: CLINICAL INTERPRETATION OF PHQ2 SCORE: 0

## 2020-08-13 NOTE — PATIENT INSTRUCTIONS
Donepezil tablets  What is this medicine?  DONEPEZIL (palacios NEP e zil) is used to treat mild to moderate dementia caused by Alzheimer's disease.  This medicine may be used for other purposes; ask your health care provider or pharmacist if you have questions.  COMMON BRAND NAME(S): Aricept  What should I tell my health care provider before I take this medicine?  They need to know if you have any of these conditions:  · asthma or other lung disease  · difficulty passing urine  · head injury  · heart disease  · history of irregular heartbeat  · liver disease  · seizures (convulsions)  · stomach or intestinal disease, ulcers or stomach bleeding  · an unusual or allergic reaction to donepezil, other medicines, foods, dyes, or preservatives  · pregnant or trying to get pregnant  · breast-feeding  How should I use this medicine?  Take this medicine by mouth with a glass of water. Follow the directions on the prescription label. You may take this medicine with or without food. Take this medicine at regular intervals. This medicine is usually taken before bedtime. Do not take it more often than directed. Continue to take your medicine even if you feel better. Do not stop taking except on your doctor's advice.  If you are taking the 23 mg donepezil tablet, swallow it whole; do not cut, crush, or chew it.  Talk to your pediatrician regarding the use of this medicine in children. Special care may be needed.  Overdosage: If you think you have taken too much of this medicine contact a poison control center or emergency room at once.  NOTE: This medicine is only for you. Do not share this medicine with others.  What if I miss a dose?  If you miss a dose, take it as soon as you can. If it is almost time for your next dose, take only that dose, do not take double or extra doses.  What may interact with this medicine?  Do not take this medicine with any of the following medications:  · certain medicines for fungal infections like  itraconazole, fluconazole, posaconazole, and voriconazole  · cisapride  · dextromethorphan; quinidine  · dronedarone  · pimozide  · quinidine  · thioridazine  This medicine may also interact with the following medications:  · antihistamines for allergy, cough and cold  · atropine  · bethanechol  · carbamazepine  · certain medicines for bladder problems like oxybutynin, tolterodine  · certain medicines for Parkinson's disease like benztropine, trihexyphenidyl  · certain medicines for stomach problems like dicyclomine, hyoscyamine  · certain medicines for travel sickness like scopolamine  · dexamethasone  · dofetilide  · ipratropium  · NSAIDs, medicines for pain and inflammation, like ibuprofen or naproxen  · other medicines for Alzheimer's disease  · other medicines that prolong the QT interval (cause an abnormal heart rhythm)  · phenobarbital  · phenytoin  · rifampin, rifabutin or rifapentine  · ziprasidone  This list may not describe all possible interactions. Give your health care provider a list of all the medicines, herbs, non-prescription drugs, or dietary supplements you use. Also tell them if you smoke, drink alcohol, or use illegal drugs. Some items may interact with your medicine.  What should I watch for while using this medicine?  Visit your doctor or health care professional for regular checks on your progress. Check with your doctor or health care professional if your symptoms do not get better or if they get worse.  You may get drowsy or dizzy. Do not drive, use machinery, or do anything that needs mental alertness until you know how this drug affects you.  What side effects may I notice from receiving this medicine?  Side effects that you should report to your doctor or health care professional as soon as possible:  · allergic reactions like skin rash, itching or hives, swelling of the face, lips, or tongue  · feeling faint or lightheaded, falls  · loss of bladder control  · seizures  · signs and  symptoms of a dangerous change in heartbeat or heart rhythm like chest pain; dizziness; fast or irregular heartbeat; palpitations; feeling faint or lightheaded, falls; breathing problems  · signs and symptoms of infection like fever or chills; cough; sore throat; pain or trouble passing urine  · signs and symptoms of liver injury like dark yellow or brown urine; general ill feeling or flu-like symptoms; light-colored stools; loss of appetite; nausea; right upper belly pain; unusually weak or tired; yellowing of the eyes or skin  · slow heartbeat or palpitations  · unusual bleeding or bruising  · vomiting  Side effects that usually do not require medical attention (report to your doctor or health care professional if they continue or are bothersome):  · diarrhea, especially when starting treatment  · headache  · loss of appetite  · muscle cramps  · nausea  · stomach upset  This list may not describe all possible side effects. Call your doctor for medical advice about side effects. You may report side effects to FDA at 8-136-TIL-3629.  Where should I keep my medicine?  Keep out of reach of children.  Store at room temperature between 15 and 30 degrees C (59 and 86 degrees F). Throw away any unused medicine after the expiration date.  NOTE: This sheet is a summary. It may not cover all possible information. If you have questions about this medicine, talk to your doctor, pharmacist, or health care provider.  © 2020 [employer]/Gold Standard (2019-12-09 10:33:41)

## 2020-08-13 NOTE — ASSESSMENT & PLAN NOTE
Patient reports that her short-term memory seems to be worsening.  Her  feels like it is worsened over the last year.  They recently sold their house at [facility] with 1 day on the market and a 30-day escrow.  They have been there for over 25 years.  She was very stressed about the move as it was difficult to find a place to rent in [place].  She felt like the memory worsened significantly then.  Their son and daughter visited and were worried about her short-term memory.  He has not had any difficulties with getting lost or forgetting where she parked the car.

## 2020-08-13 NOTE — PROGRESS NOTES
Subjective:     Chief Complaint   Patient presents with   • Memory Loss       Cristin Elizalde is a 80 y.o. female here today for evaluation and management of:    Memory changes  Patient reports that her short-term memory seems to be worsening.  Her  feels like it is worsened over the last year.  They recently sold their house at [facility] [place] by [facility] with 1 day on the market and a 30-day escrow.  They have been there for over 25 years.  She was very stressed about the move as it was difficult to find a place to rent in [place].  She felt like the memory worsened significantly then.  Their son and daughter visited and were worried about her short-term memory.  He has not had any difficulties with getting lost or forgetting where she parked the car.    Hypothyroidism due to acquired atrophy of thyroid  Stable. Currently taking levothyroxine 50 mcg daily as prescribed.  Denies palpitations, skin changes, temperature intolerance, or changes in bowel habits           Allergies   Allergen Reactions   • Boniva [Ibandronic Acid] Unspecified     Had jaw necrosis.  Also had same reaction on Fosamax   • Clindamycin Hives   • Ibuprofen Anaphylaxis   • Penicillins Hives   • Tape Hives       Current medicines (including changes today)  Current Outpatient Medications   Medication Sig Dispense Refill   • traZODone (DESYREL) 50 MG Tab Take 1 Tab by mouth every bedtime. 90 Tab 0   • levothyroxine (SYNTHROID) 50 MCG Tab TAKE 1 TABLET ONCE DAILY INTHE MORNING ON AN EMPTY    STOMACH 90 Tab 0   • vitamin D (CHOLECALCIFEROL) 1000 UNIT Tab Take 1,000 Units by mouth every day.     • MULTIVITAMINS PO every day.        No current facility-administered medications for this visit.        She  has a past medical history of Arthritis, ASTHMA, Hypothyroidism (acquired), Osteoporosis, and Pain. She also has no past medical history of Encounter for long-term (current) use of other medications.    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  No nausea or vomiting.  No chest pain or palpitations.  No cough or SOB.  No pain with urination or hematuria.  No black or bloody stools.       Objective:     /50 (BP Location: Right arm, Patient Position: Sitting, BP Cuff Size: Adult)   Pulse 72   Temp 36.8 °C (98.2 °F) (Temporal)   Resp 16   Ht 1.524 m (5')   Wt 45.4 kg (100 lb)   SpO2 98%  Body mass index is 19.53 kg/m².   Physical Exam:  Well developed, well nourished.  Alert, oriented in no acute distress.  Eye contact is good, speech goal directed, affect calm  Eyes: conjunctiva non-injected, sclera non-icteric.  Neck Supple.  No adenopathy or masses in the neck or supraclavicular regions. No thyromegaly  Lungs: clear to auscultation bilaterally with good excursion. No wheezes or rhonchi  CV: regular rate and rhythm. No murmur  MMSE    -What is the:  Year, season, date, day, month.               5/5 points    -Where are we:   State, county, town, hospital, floor.      5/5 points    -3 objects immediate recall.                                             3/3 points    -World backwards or serial sevens.                                 5/5 points    -Three-minute recall.                                                         3/3 points    -Name 2 objects.                                                               2/2 points    -Repeat the following.      \"No ifs and is or buts\"                                                    1/1 point    -Follow a 3 stage command.      Paper right hand, fold in half, " place on floor.                 3/3 points    -Follow a written command.       Close your eyes                                                          1/1 point    -Write a complete sentence.                                           1/1 point    -Copy a design.                                                                1/1 point    Total                                                                                  30/30    Cutoff of 24 indicating dementia.          Assessment and Plan:   The following treatment plan was discussed    1. Hypothyroidism due to acquired atrophy of thyroid  Check labs.  Adjust levothyroxine dose as needed  - TSH; Future  - FREE THYROXINE; Future    2. Other neutropenia (HCC)  Recheck CBC  - CBC WITH DIFFERENTIAL; Future    3. Vitamin D deficiency  Check vitamin D level and adjust supplementation as needed  - VITAMIN D,25 HYDROXY; Future    4. Memory changes  Labs to see if there is an underlying cause for memory change.  Patient given information on Aricept to consider.  If labs are normal she will let me know if she like to start this  - Comp Metabolic Panel; Future  - CBC WITH DIFFERENTIAL; Future    5. Encounter for screening mammogram for breast cancer  Screening mammogram ordered.  Await results for counseling.    - MA-SCREENING MAMMO BILAT W/CAD; Future    6. Immunity status testing  She requested  - SARS CoV-2 Ab, Total; Future    7. Osteopenia of multiple sites  Check bone density  - DS-BONE DENSITY STUDY (DEXA); Future    8. Postmenopausal  Check bone density  - DS-BONE DENSITY STUDY (DEXA); Future    Any change or worsening of signs or symptoms, patient encouraged to follow-up or report to the emergency room for further evaluation. Patient understands and agrees.    Followup: Return in about 3 months (around 11/13/2020).

## 2020-08-14 ENCOUNTER — HOSPITAL ENCOUNTER (OUTPATIENT)
Dept: LAB | Facility: MEDICAL CENTER | Age: 80
End: 2020-08-14
Attending: FAMILY MEDICINE
Payer: MEDICARE

## 2020-08-14 DIAGNOSIS — E55.9 VITAMIN D DEFICIENCY: ICD-10-CM

## 2020-08-14 DIAGNOSIS — E03.4 HYPOTHYROIDISM DUE TO ACQUIRED ATROPHY OF THYROID: ICD-10-CM

## 2020-08-14 DIAGNOSIS — D70.8 OTHER NEUTROPENIA (HCC): ICD-10-CM

## 2020-08-14 DIAGNOSIS — R41.3 MEMORY CHANGES: ICD-10-CM

## 2020-08-14 DIAGNOSIS — Z01.84 IMMUNITY STATUS TESTING: ICD-10-CM

## 2020-08-14 LAB
25(OH)D3 SERPL-MCNC: 85 NG/ML (ref 30–100)
ALBUMIN SERPL BCP-MCNC: 4.5 G/DL (ref 3.2–4.9)
ALBUMIN/GLOB SERPL: 2 G/DL
ALP SERPL-CCNC: 64 U/L (ref 30–99)
ALT SERPL-CCNC: 17 U/L (ref 2–50)
ANION GAP SERPL CALC-SCNC: 12 MMOL/L (ref 7–16)
AST SERPL-CCNC: 24 U/L (ref 12–45)
BASOPHILS # BLD AUTO: 1.5 % (ref 0–1.8)
BASOPHILS # BLD: 0.06 K/UL (ref 0–0.12)
BILIRUB SERPL-MCNC: 0.6 MG/DL (ref 0.1–1.5)
BUN SERPL-MCNC: 12 MG/DL (ref 8–22)
CALCIUM SERPL-MCNC: 10 MG/DL (ref 8.5–10.5)
CHLORIDE SERPL-SCNC: 103 MMOL/L (ref 96–112)
CO2 SERPL-SCNC: 27 MMOL/L (ref 20–33)
CREAT SERPL-MCNC: 0.65 MG/DL (ref 0.5–1.4)
EOSINOPHIL # BLD AUTO: 0.09 K/UL (ref 0–0.51)
EOSINOPHIL NFR BLD: 2.2 % (ref 0–6.9)
ERYTHROCYTE [DISTWIDTH] IN BLOOD BY AUTOMATED COUNT: 48.6 FL (ref 35.9–50)
FASTING STATUS PATIENT QL REPORTED: NORMAL
GLOBULIN SER CALC-MCNC: 2.2 G/DL (ref 1.9–3.5)
GLUCOSE SERPL-MCNC: 93 MG/DL (ref 65–99)
HCT VFR BLD AUTO: 42.6 % (ref 37–47)
HGB BLD-MCNC: 14 G/DL (ref 12–16)
IMM GRANULOCYTES # BLD AUTO: 0 K/UL (ref 0–0.11)
IMM GRANULOCYTES NFR BLD AUTO: 0 % (ref 0–0.9)
LYMPHOCYTES # BLD AUTO: 1.64 K/UL (ref 1–4.8)
LYMPHOCYTES NFR BLD: 40.8 % (ref 22–41)
MCH RBC QN AUTO: 31.7 PG (ref 27–33)
MCHC RBC AUTO-ENTMCNC: 32.9 G/DL (ref 33.6–35)
MCV RBC AUTO: 96.6 FL (ref 81.4–97.8)
MONOCYTES # BLD AUTO: 0.42 K/UL (ref 0–0.85)
MONOCYTES NFR BLD AUTO: 10.4 % (ref 0–13.4)
NEUTROPHILS # BLD AUTO: 1.81 K/UL (ref 2–7.15)
NEUTROPHILS NFR BLD: 45.1 % (ref 44–72)
NRBC # BLD AUTO: 0 K/UL
NRBC BLD-RTO: 0 /100 WBC
PLATELET # BLD AUTO: 189 K/UL (ref 164–446)
PMV BLD AUTO: 11.4 FL (ref 9–12.9)
POTASSIUM SERPL-SCNC: 4.4 MMOL/L (ref 3.6–5.5)
PROT SERPL-MCNC: 6.7 G/DL (ref 6–8.2)
RBC # BLD AUTO: 4.41 M/UL (ref 4.2–5.4)
SARS-COV-2 AB SERPL QL IA: NORMAL
SODIUM SERPL-SCNC: 142 MMOL/L (ref 135–145)
T4 FREE SERPL-MCNC: 1.56 NG/DL (ref 0.93–1.7)
TSH SERPL DL<=0.005 MIU/L-ACNC: 3.23 UIU/ML (ref 0.38–5.33)
WBC # BLD AUTO: 4 K/UL (ref 4.8–10.8)

## 2020-08-14 PROCEDURE — 84443 ASSAY THYROID STIM HORMONE: CPT

## 2020-08-14 PROCEDURE — 80053 COMPREHEN METABOLIC PANEL: CPT

## 2020-08-14 PROCEDURE — 82306 VITAMIN D 25 HYDROXY: CPT

## 2020-08-14 PROCEDURE — 36415 COLL VENOUS BLD VENIPUNCTURE: CPT

## 2020-08-14 PROCEDURE — 86769 SARS-COV-2 COVID-19 ANTIBODY: CPT

## 2020-08-14 PROCEDURE — 84439 ASSAY OF FREE THYROXINE: CPT

## 2020-08-14 PROCEDURE — 85025 COMPLETE CBC W/AUTO DIFF WBC: CPT

## 2020-08-26 DIAGNOSIS — F51.01 PRIMARY INSOMNIA: ICD-10-CM

## 2020-08-26 NOTE — TELEPHONE ENCOUNTER
Received request via: Pharmacy    Was the patient seen in the last year in this department? Yes lov 8/13/2020    Does the patient have an active prescription (recently filled or refills available) for medication(s) requested? No

## 2020-08-27 RX ORDER — TRAZODONE HYDROCHLORIDE 50 MG/1
50 TABLET ORAL
Qty: 90 TAB | Refills: 3 | Status: SHIPPED | OUTPATIENT
Start: 2020-08-27 | End: 2021-01-13 | Stop reason: SDUPTHER

## 2020-09-16 ENCOUNTER — PATIENT MESSAGE (OUTPATIENT)
Dept: MEDICAL GROUP | Facility: LAB | Age: 80
End: 2020-09-16

## 2020-09-16 DIAGNOSIS — R41.3 MEMORY CHANGES: ICD-10-CM

## 2020-09-16 RX ORDER — DONEPEZIL HYDROCHLORIDE 5 MG/1
5 TABLET, FILM COATED ORAL EVERY EVENING
Qty: 30 TAB | Refills: 1 | Status: SHIPPED | OUTPATIENT
Start: 2020-09-16 | End: 2020-10-20

## 2020-09-16 NOTE — TELEPHONE ENCOUNTER
From: Cristin Elizalde  To: Anabelle Pryor M.D.  Sent: 9/16/2020 3:42 PM PDT  Subject: Non-Urgent Medical Question    Do we need an appointment for the flu shot? Please send an RX for Aricept to [facility] at [address]. Thanks, Amrita      ----- Message -----   From:Anabelle Pryor M.D.   Sent:9/16/2020 3:04 PM PDT   To:Cristin Elizalde   Subject:RE: Non-Urgent Medical Question    We could consider Aricept for your memory.  You and Sal can get flu shots here as of Monday.  Anabelle Pryor M.D.      ----- Message -----   From:Cristin Elizalde   Sent:9/16/2020 2:43 PM PDT   To:Anabelle Pryor M.D.   Subject:Non-Urgent Medical Question    What type of medicine would you recommend for my memory loss?     Also, can Sal and I get flu shots at your office?    Amrita

## 2020-09-21 RX ORDER — LEVOTHYROXINE SODIUM 0.05 MG/1
TABLET ORAL
Qty: 90 TAB | Refills: 0 | Status: SHIPPED | OUTPATIENT
Start: 2020-09-21 | End: 2020-12-09 | Stop reason: SDUPTHER

## 2020-09-21 NOTE — TELEPHONE ENCOUNTER
Received request via: Pharmacy    Was the patient seen in the last year in this department? Yes  8/13/20  Does the patient have an active prescription (recently filled or refills available) for medication(s) requested? No

## 2020-10-05 ENCOUNTER — HOSPITAL ENCOUNTER (OUTPATIENT)
Dept: RADIOLOGY | Facility: MEDICAL CENTER | Age: 80
End: 2020-10-05
Attending: FAMILY MEDICINE
Payer: MEDICARE

## 2020-10-05 DIAGNOSIS — Z78.0 POSTMENOPAUSAL: ICD-10-CM

## 2020-10-05 DIAGNOSIS — M85.89 OSTEOPENIA OF MULTIPLE SITES: ICD-10-CM

## 2020-10-05 DIAGNOSIS — Z12.31 ENCOUNTER FOR SCREENING MAMMOGRAM FOR BREAST CANCER: ICD-10-CM

## 2020-10-05 PROCEDURE — 77080 DXA BONE DENSITY AXIAL: CPT

## 2020-10-05 PROCEDURE — 77067 SCR MAMMO BI INCL CAD: CPT

## 2020-10-20 ENCOUNTER — OFFICE VISIT (OUTPATIENT)
Dept: MEDICAL GROUP | Facility: LAB | Age: 80
End: 2020-10-20
Payer: MEDICARE

## 2020-10-20 VITALS
HEIGHT: 60 IN | OXYGEN SATURATION: 97 % | HEART RATE: 62 BPM | DIASTOLIC BLOOD PRESSURE: 50 MMHG | BODY MASS INDEX: 20.22 KG/M2 | TEMPERATURE: 99.1 F | SYSTOLIC BLOOD PRESSURE: 90 MMHG | WEIGHT: 103 LBS | RESPIRATION RATE: 16 BRPM

## 2020-10-20 DIAGNOSIS — R41.3 MEMORY CHANGES: ICD-10-CM

## 2020-10-20 DIAGNOSIS — E03.4 HYPOTHYROIDISM DUE TO ACQUIRED ATROPHY OF THYROID: ICD-10-CM

## 2020-10-20 PROBLEM — Z00.00 MEDICARE ANNUAL WELLNESS VISIT, SUBSEQUENT: Status: RESOLVED | Noted: 2019-08-12 | Resolved: 2020-10-20

## 2020-10-20 PROCEDURE — 99214 OFFICE O/P EST MOD 30 MIN: CPT | Performed by: FAMILY MEDICINE

## 2020-10-20 RX ORDER — DONEPEZIL HYDROCHLORIDE 10 MG/1
10 TABLET, FILM COATED ORAL DAILY
Qty: 90 TAB | Refills: 3 | Status: SHIPPED | OUTPATIENT
Start: 2020-10-20 | End: 2021-01-13 | Stop reason: SDUPTHER

## 2020-10-20 ASSESSMENT — FIBROSIS 4 INDEX: FIB4 SCORE: 2.4

## 2020-10-20 NOTE — PROGRESS NOTES
Subjective:     Chief Complaint   Patient presents with   • Medication Follow-up       Cristin Elizalde is a 80 y.o. female here today for evaluation and management of:    Memory changes  Patient feels like the Aricept is helping.  She is not forgetting what she went into rooms for any longer.  She is not having any side effects.  Overall is doing well.  Her  has noticed some improvement.  She denies any unusual headaches or neurologic symptoms.       Allergies   Allergen Reactions   • Boniva [Ibandronic Acid] Unspecified     Had jaw necrosis.  Also had same reaction on Fosamax   • Clindamycin Hives   • Ibuprofen Anaphylaxis   • Penicillins Hives   • Tape Hives       Current medicines (including changes today)  Current Outpatient Medications   Medication Sig Dispense Refill   • donepezil (ARICEPT) 10 MG tablet Take 1 Tab by mouth every day. 90 Tab 3   • levothyroxine (SYNTHROID) 50 MCG Tab TAKE 1 TABLET ONCE DAILY INTHE MORNING ON AN EMPTY    STOMACH 90 Tab 0   • traZODone (DESYREL) 50 MG Tab Take 1 Tab by mouth every bedtime. 90 Tab 3   • vitamin D (CHOLECALCIFEROL) 1000 UNIT Tab Take 1,000 Units by mouth every day.     • MULTIVITAMINS PO every day.        No current facility-administered medications for this visit.        She  has a past medical history of Arthritis, ASTHMA, Hypothyroidism (acquired), Osteoporosis, and Pain. She also has no past medical history of Encounter for long-term (current) use of other medications.    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  No nausea or vomiting.  No chest pain or  "palpitations.  No cough or SOB.  No pain with urination or hematuria.  No black or bloody stools.       Objective:     BP (!) 90/50 (BP Location: Right arm, Patient Position: Sitting, BP Cuff Size: Adult)   Pulse 62   Temp 37.3 °C (99.1 °F) (Temporal)   Resp 16   Ht 1.524 m (5')   Wt 46.7 kg (103 lb)   SpO2 97%  Body mass index is 20.12 kg/m².   Physical Exam:  Well developed, well nourished.  Alert, oriented in no acute distress.  Eye contact is good, speech goal directed, affect calm  Eyes: conjunctiva non-injected, sclera non-icteric.  Neck Supple.  No adenopathy or masses in the neck or supraclavicular regions. No thyromegaly  Lungs: clear to auscultation bilaterally with good excursion. No wheezes or rhonchi  CV: regular rate and rhythm. No murmur  MMSE    -What is the:  Year, season, date, day, month.               4/5 points    -Where are we:   State, county, town, hospital, floor.      5/5 points    -3 objects immediate recall.                                             3/3 points    -World backwards or serial sevens.                                 5/5 points    -Three-minute recall.                                                         3/3 points    -Name 2 objects.                                                               2/2 points    -Repeat the following.      \"No ifs and is or buts\"                                                    1/1 point    -Follow a 3 stage command.      Paper right hand, fold in half, place on floor.                 3/3 points    -Follow a written command.       Close your eyes                                                          1/1 point    -Write a complete sentence.                                            1/1 point    -Copy a design.                                                                1/1 point    Total                                                                                  29/30    Cutoff of 24 indicating dementia.      Assessment " and Plan:   The following treatment plan was discussed    1. Memory changes  Tolerating well.  Increase to 10 mg daily.  New prescription sent  - donepezil (ARICEPT) 10 MG tablet; Take 1 Tab by mouth every day.  Dispense: 90 Tab; Refill: 3    2. Hypothyroidism due to acquired atrophy of thyroid  Continue current levothyroxine dose.  Continue to monitor    Any change or worsening of signs or symptoms, patient encouraged to follow-up or report to the emergency room for further evaluation. Patient understands and agrees.    Followup: Return in about 6 weeks (around 12/2/2020) for AWV.

## 2020-10-20 NOTE — ASSESSMENT & PLAN NOTE
Patient feels like the Aricept is helping.  She is not forgetting what she went into rooms for any longer.  She is not having any side effects.  Overall is doing well.  Her  has noticed some improvement.  She denies any unusual headaches or neurologic symptoms.

## 2020-11-11 DIAGNOSIS — Z00.6 RESEARCH STUDY PATIENT: ICD-10-CM

## 2020-11-30 ENCOUNTER — HOSPITAL ENCOUNTER (OUTPATIENT)
Facility: MEDICAL CENTER | Age: 80
End: 2020-11-30
Attending: PATHOLOGY
Payer: COMMERCIAL

## 2020-12-01 ENCOUNTER — APPOINTMENT (RX ONLY)
Dept: URBAN - METROPOLITAN AREA CLINIC 4 | Facility: CLINIC | Age: 80
Setting detail: DERMATOLOGY
End: 2020-12-01

## 2020-12-01 DIAGNOSIS — D18.0 HEMANGIOMA: ICD-10-CM

## 2020-12-01 DIAGNOSIS — L82.1 OTHER SEBORRHEIC KERATOSIS: ICD-10-CM

## 2020-12-01 DIAGNOSIS — L82.0 INFLAMED SEBORRHEIC KERATOSIS: ICD-10-CM

## 2020-12-01 DIAGNOSIS — L81.4 OTHER MELANIN HYPERPIGMENTATION: ICD-10-CM

## 2020-12-01 DIAGNOSIS — D22 MELANOCYTIC NEVI: ICD-10-CM

## 2020-12-01 DIAGNOSIS — L57.8 OTHER SKIN CHANGES DUE TO CHRONIC EXPOSURE TO NONIONIZING RADIATION: ICD-10-CM

## 2020-12-01 PROBLEM — D18.01 HEMANGIOMA OF SKIN AND SUBCUTANEOUS TISSUE: Status: ACTIVE | Noted: 2020-12-01

## 2020-12-01 PROBLEM — D22.5 MELANOCYTIC NEVI OF TRUNK: Status: ACTIVE | Noted: 2020-12-01

## 2020-12-01 PROCEDURE — ? COUNSELING

## 2020-12-01 PROCEDURE — ? LIQUID NITROGEN

## 2020-12-01 PROCEDURE — 99213 OFFICE O/P EST LOW 20 MIN: CPT | Mod: 25

## 2020-12-01 PROCEDURE — 17110 DESTRUCTION B9 LES UP TO 14: CPT

## 2020-12-01 ASSESSMENT — LOCATION SIMPLE DESCRIPTION DERM
LOCATION SIMPLE: RIGHT UPPER BACK
LOCATION SIMPLE: LEFT CHEEK
LOCATION SIMPLE: LEFT UPPER ARM
LOCATION SIMPLE: RIGHT FOREARM
LOCATION SIMPLE: LEFT FOREARM
LOCATION SIMPLE: RIGHT THIGH
LOCATION SIMPLE: CHEST
LOCATION SIMPLE: LEFT THIGH
LOCATION SIMPLE: RIGHT CHEEK
LOCATION SIMPLE: RIGHT UPPER ARM

## 2020-12-01 ASSESSMENT — LOCATION DETAILED DESCRIPTION DERM
LOCATION DETAILED: RIGHT SUPERIOR MEDIAL UPPER BACK
LOCATION DETAILED: LEFT MEDIAL SUPERIOR CHEST
LOCATION DETAILED: RIGHT CENTRAL MALAR CHEEK
LOCATION DETAILED: LEFT INFERIOR CENTRAL MALAR CHEEK
LOCATION DETAILED: LEFT LATERAL SUPERIOR CHEST
LOCATION DETAILED: RIGHT DISTAL DORSAL FOREARM
LOCATION DETAILED: RIGHT ANTERIOR DISTAL THIGH
LOCATION DETAILED: LEFT ANTERIOR DISTAL THIGH
LOCATION DETAILED: RIGHT INFERIOR UPPER BACK
LOCATION DETAILED: LEFT DISTAL DORSAL FOREARM
LOCATION DETAILED: LEFT ANTERIOR PROXIMAL UPPER ARM
LOCATION DETAILED: RIGHT MID-UPPER BACK
LOCATION DETAILED: RIGHT ANTERIOR PROXIMAL UPPER ARM

## 2020-12-01 ASSESSMENT — LOCATION ZONE DERM
LOCATION ZONE: TRUNK
LOCATION ZONE: ARM
LOCATION ZONE: LEG
LOCATION ZONE: FACE

## 2020-12-01 NOTE — PROCEDURE: LIQUID NITROGEN
Render Note In Bullet Format When Appropriate: No
Medical Necessity Information: It is in your best interest to select a reason for this procedure from the list below. All of these items fulfill various CMS LCD requirements except the new and changing color options.
Number Of Freeze-Thaw Cycles: 1 freeze-thaw cycle
Duration Of Freeze Thaw-Cycle (Seconds): 3
Medical Necessity Clause: This procedure was medically necessary because the lesions that were treated were:
Post-Care Instructions: I reviewed with the patient in detail post-care instructions. Patient is to wear sunprotection, and avoid picking at any of the treated lesions. Pt may apply Vaseline to crusted or scabbing areas.
Consent: The patient's consent was obtained including but not limited to risks of crusting, scabbing, blistering, scarring, darker or lighter pigmentary change, recurrence, incomplete removal and infection.
Aperture Size (Optional): C
Detail Level: Detailed

## 2020-12-02 DIAGNOSIS — Z00.6 RESEARCH STUDY PATIENT: ICD-10-CM

## 2020-12-09 RX ORDER — LEVOTHYROXINE SODIUM 0.05 MG/1
TABLET ORAL
Qty: 90 TAB | Refills: 3 | Status: SHIPPED | OUTPATIENT
Start: 2020-12-09 | End: 2021-01-13 | Stop reason: SDUPTHER

## 2020-12-09 NOTE — TELEPHONE ENCOUNTER
----- Message from Cristin Elizalde sent at 12/9/2020 12:23 PM PST -----  Regarding: Prescription Question  Contact: [phone]  I need a refill for Levothyroxine Sodium Tab 50MCG (0.05MG)  I only have a two week supply left.  Thank you!  Happy Holidays !!!

## 2020-12-09 NOTE — TELEPHONE ENCOUNTER
Received request via: Pharmacy    Was the patient seen in the last year in this department? Yes  12/2/20  Does the patient have an active prescription (recently filled or refills available) for medication(s) requested? No

## 2020-12-14 LAB
ELF SCORE: 9.6
RELATIVE RISK: NORMAL
RISK GROUP: NORMAL
RISK: 3.3 %

## 2021-01-11 DIAGNOSIS — Z23 NEED FOR VACCINATION: ICD-10-CM

## 2021-01-13 DIAGNOSIS — R41.3 MEMORY CHANGES: ICD-10-CM

## 2021-01-13 DIAGNOSIS — F51.01 PRIMARY INSOMNIA: ICD-10-CM

## 2021-01-13 NOTE — TELEPHONE ENCOUNTER
Pt is using [facility] delivery per insurance, patient requesting refills    Received request via: Patient    Was the patient seen in the last year in this department? Yes  12/2/2020  Does the patient have an active prescription (recently filled or refills available) for medication(s) requested? No

## 2021-01-14 RX ORDER — TRAZODONE HYDROCHLORIDE 50 MG/1
50 TABLET ORAL
Qty: 90 TAB | Refills: 3 | Status: SHIPPED | OUTPATIENT
Start: 2021-01-14 | End: 2022-01-12

## 2021-01-14 RX ORDER — DONEPEZIL HYDROCHLORIDE 10 MG/1
10 TABLET, FILM COATED ORAL DAILY
Qty: 90 TAB | Refills: 3 | Status: SHIPPED | OUTPATIENT
Start: 2021-01-14 | End: 2022-01-12

## 2021-01-14 RX ORDER — LEVOTHYROXINE SODIUM 0.05 MG/1
TABLET ORAL
Qty: 90 TAB | Refills: 3 | Status: SHIPPED | OUTPATIENT
Start: 2021-01-14 | End: 2022-01-12

## 2021-01-27 ENCOUNTER — IMMUNIZATION (OUTPATIENT)
Dept: FAMILY PLANNING/WOMEN'S HEALTH CLINIC | Facility: IMMUNIZATION CENTER | Age: 81
End: 2021-01-27
Attending: INTERNAL MEDICINE
Payer: MEDICARE

## 2021-01-27 DIAGNOSIS — Z23 ENCOUNTER FOR VACCINATION: Primary | ICD-10-CM

## 2021-01-27 DIAGNOSIS — Z23 NEED FOR VACCINATION: ICD-10-CM

## 2021-01-27 PROCEDURE — 0001A PFIZER SARS-COV-2 VACCINE: CPT | Performed by: INTERNAL MEDICINE

## 2021-01-27 PROCEDURE — 91300 PFIZER SARS-COV-2 VACCINE: CPT | Performed by: INTERNAL MEDICINE

## 2021-02-10 ENCOUNTER — OFFICE VISIT (OUTPATIENT)
Dept: MEDICAL GROUP | Facility: LAB | Age: 81
End: 2021-02-10
Payer: MEDICARE

## 2021-02-10 VITALS
RESPIRATION RATE: 16 BRPM | BODY MASS INDEX: 21.01 KG/M2 | DIASTOLIC BLOOD PRESSURE: 50 MMHG | SYSTOLIC BLOOD PRESSURE: 90 MMHG | HEART RATE: 55 BPM | HEIGHT: 60 IN | TEMPERATURE: 98.4 F | WEIGHT: 107 LBS | OXYGEN SATURATION: 98 %

## 2021-02-10 DIAGNOSIS — J01.00 SUBACUTE MAXILLARY SINUSITIS: ICD-10-CM

## 2021-02-10 PROCEDURE — 99214 OFFICE O/P EST MOD 30 MIN: CPT | Performed by: FAMILY MEDICINE

## 2021-02-10 RX ORDER — SULFAMETHOXAZOLE AND TRIMETHOPRIM 800; 160 MG/1; MG/1
1 TABLET ORAL 2 TIMES DAILY
Qty: 20 TABLET | Refills: 0 | Status: SHIPPED | OUTPATIENT
Start: 2021-02-10 | End: 2022-04-25

## 2021-02-10 ASSESSMENT — PATIENT HEALTH QUESTIONNAIRE - PHQ9: CLINICAL INTERPRETATION OF PHQ2 SCORE: 0

## 2021-02-10 ASSESSMENT — FIBROSIS 4 INDEX: FIB4 SCORE: 2.4

## 2021-02-16 ENCOUNTER — PATIENT MESSAGE (OUTPATIENT)
Dept: MEDICAL GROUP | Facility: LAB | Age: 81
End: 2021-02-16

## 2021-02-18 NOTE — PROGRESS NOTES
Subjective:     Chief Complaint   Patient presents with   • Sinus Problem     x2 months       Cristin Elizalde is a 80 y.o. female here today for evaluation and management of:    Illness: 2 months of illness including: nasal congestion, clear/whitish rhinorrhea ,  Sinus pain and pressure: right maxillary, right frontal  Symptoms negative for fever, chills, cough,   Treatments tried: none   Since onset, symptoms are worse   Similarly ill exposures: no  Medical history negative for asthma  She  reports that she has never smoked. She has never used smokeless tobacco.      Allergies   Allergen Reactions   • Boniva [Ibandronic Acid] Unspecified     Had jaw necrosis.  Also had same reaction on Fosamax   • Clindamycin Hives   • Ibuprofen Anaphylaxis   • Penicillins Hives   • Tape Hives       Current medicines (including changes today)  Current Outpatient Medications   Medication Sig Dispense Refill   • sulfamethoxazole-trimethoprim (BACTRIM DS) 800-160 MG tablet Take 1 tablet by mouth 2 times a day. 20 tablet 0   • traZODone (DESYREL) 50 MG Tab Take 1 Tab by mouth every bedtime. 90 Tab 3   • donepezil (ARICEPT) 10 MG tablet Take 1 Tab by mouth every day. 90 Tab 3   • levothyroxine (SYNTHROID) 50 MCG Tab TAKE 1 TABLET ONCE DAILY INTHE MORNING ON AN EMPTY    STOMACH 90 Tab 3   • vitamin D (CHOLECALCIFEROL) 1000 UNIT Tab Take 1,000 Units by mouth every day.     • MULTIVITAMINS PO every day.        No current facility-administered medications for this visit.       She  has a past medical history of Arthritis, ASTHMA, Hypothyroidism (acquired), Osteoporosis, and Pain. She also has no past medical history of Encounter for long-term (current) use of other medications.    Patient Active Problem List    Diagnosis Date Noted   • Memory changes 08/13/2020   • Primary insomnia 08/12/2019   • Other neutropenia (HCC) 08/07/2018   • Seronegative erosive rheumatoid arthritis (HCC) 04/03/2018   • Oral bisphosphonate-related jaw necrosis (HCC)  11/18/2016   • Vitamin D deficiency 11/18/2016   • Osteopenia of multiple sites 05/04/2016   • Hypothyroidism due to acquired atrophy of thyroid 05/04/2016       ROS   No fever or chills.  No nausea or vomiting.  No chest pain or palpitations.  No cough or SOB.  No pain with urination or hematuria.  No black or bloody stools.       Objective:     BP (!) 90/50 (BP Location: Right arm, Patient Position: Sitting, BP Cuff Size: Adult)   Pulse (!) 55   Temp 36.9 °C (98.4 °F) (Temporal)   Resp 16   Ht 1.524 m (5')   Wt 48.5 kg (107 lb)   SpO2 98%  Body mass index is 20.9 kg/m².   Physical Exam:  Well developed, well nourished.  Alert, oriented in no acute distress.  Eye contact is good, speech goal directed, affect calm  Eyes: conjunctiva non-injected, sclera non-icteric.  Ears: Pinna normal. TM pearly gray.   Nose: Nares are patent.  Erythematous swollen mucosa without discharge  Mouth: Oral mucous membranes pink and moist with no lesions.  Mild diffuse erythema of the posterior pharynx with white postnasal drainage  Sinuses tender to palpation in the maxillary sinus  Neck Supple.  No adenopathy or masses in the neck or supraclavicular regions. No thyromegaly  Lungs: clear to auscultation bilaterally with good excursion. No wheezes or rhonchi  CV: regular rate and rhythm. No murmur            Assessment and Plan:   The following treatment plan was discussed    1. Subacute maxillary sinusitis  Patient has multiple drug allergies.  Trial of Bactrim twice a day for 10 days.  Increase fluids and rest.  If symptoms are not improving consider CT of the sinuses to further assess.  She may need a fluoroquinolone given her allergies but would want further diagnostic testing before we did that.  - sulfamethoxazole-trimethoprim (BACTRIM DS) 800-160 MG tablet; Take 1 tablet by mouth 2 times a day.  Dispense: 20 tablet; Refill: 0    Any change or worsening of signs or symptoms, patient encouraged to follow-up or report to the  emergency room for further evaluation. Patient understands and agrees.    Followup: Return in about 6 months (around 8/10/2021).

## 2021-02-19 ENCOUNTER — IMMUNIZATION (OUTPATIENT)
Dept: FAMILY PLANNING/WOMEN'S HEALTH CLINIC | Facility: IMMUNIZATION CENTER | Age: 81
End: 2021-02-19
Payer: MEDICARE

## 2021-02-19 DIAGNOSIS — Z23 ENCOUNTER FOR VACCINATION: Primary | ICD-10-CM

## 2021-02-19 PROCEDURE — 91300 PFIZER SARS-COV-2 VACCINE: CPT

## 2021-02-19 PROCEDURE — 0002A PFIZER SARS-COV-2 VACCINE: CPT

## 2021-03-15 ENCOUNTER — PATIENT MESSAGE (OUTPATIENT)
Dept: MEDICAL GROUP | Facility: LAB | Age: 81
End: 2021-03-15

## 2021-03-15 DIAGNOSIS — E03.4 HYPOTHYROIDISM DUE TO ACQUIRED ATROPHY OF THYROID: ICD-10-CM

## 2021-03-15 DIAGNOSIS — R53.83 FATIGUE, UNSPECIFIED TYPE: ICD-10-CM

## 2021-03-15 DIAGNOSIS — D70.8 OTHER NEUTROPENIA (HCC): ICD-10-CM

## 2021-03-18 ENCOUNTER — HOSPITAL ENCOUNTER (OUTPATIENT)
Dept: LAB | Facility: MEDICAL CENTER | Age: 81
End: 2021-03-18
Attending: FAMILY MEDICINE
Payer: MEDICARE

## 2021-03-18 DIAGNOSIS — D70.8 OTHER NEUTROPENIA (HCC): ICD-10-CM

## 2021-03-18 DIAGNOSIS — R53.83 FATIGUE, UNSPECIFIED TYPE: ICD-10-CM

## 2021-03-18 DIAGNOSIS — E03.4 HYPOTHYROIDISM DUE TO ACQUIRED ATROPHY OF THYROID: ICD-10-CM

## 2021-03-18 LAB
ALBUMIN SERPL BCP-MCNC: 4.3 G/DL (ref 3.2–4.9)
ALBUMIN/GLOB SERPL: 1.8 G/DL
ALP SERPL-CCNC: 53 U/L (ref 30–99)
ALT SERPL-CCNC: 13 U/L (ref 2–50)
ANION GAP SERPL CALC-SCNC: 7 MMOL/L (ref 7–16)
AST SERPL-CCNC: 24 U/L (ref 12–45)
BASOPHILS # BLD AUTO: 1.7 % (ref 0–1.8)
BASOPHILS # BLD: 0.09 K/UL (ref 0–0.12)
BILIRUB SERPL-MCNC: 0.4 MG/DL (ref 0.1–1.5)
BUN SERPL-MCNC: 12 MG/DL (ref 8–22)
CALCIUM SERPL-MCNC: 10.4 MG/DL (ref 8.5–10.5)
CHLORIDE SERPL-SCNC: 102 MMOL/L (ref 96–112)
CO2 SERPL-SCNC: 27 MMOL/L (ref 20–33)
CREAT SERPL-MCNC: 0.79 MG/DL (ref 0.5–1.4)
EOSINOPHIL # BLD AUTO: 0.15 K/UL (ref 0–0.51)
EOSINOPHIL NFR BLD: 2.9 % (ref 0–6.9)
ERYTHROCYTE [DISTWIDTH] IN BLOOD BY AUTOMATED COUNT: 44.1 FL (ref 35.9–50)
FASTING STATUS PATIENT QL REPORTED: NORMAL
GLOBULIN SER CALC-MCNC: 2.4 G/DL (ref 1.9–3.5)
GLUCOSE SERPL-MCNC: 79 MG/DL (ref 65–99)
HCT VFR BLD AUTO: 41.9 % (ref 37–47)
HGB BLD-MCNC: 13.8 G/DL (ref 12–16)
IMM GRANULOCYTES # BLD AUTO: 0.01 K/UL (ref 0–0.11)
IMM GRANULOCYTES NFR BLD AUTO: 0.2 % (ref 0–0.9)
LYMPHOCYTES # BLD AUTO: 2.77 K/UL (ref 1–4.8)
LYMPHOCYTES NFR BLD: 53.3 % (ref 22–41)
MCH RBC QN AUTO: 30.9 PG (ref 27–33)
MCHC RBC AUTO-ENTMCNC: 32.9 G/DL (ref 33.6–35)
MCV RBC AUTO: 93.9 FL (ref 81.4–97.8)
MONOCYTES # BLD AUTO: 0.49 K/UL (ref 0–0.85)
MONOCYTES NFR BLD AUTO: 9.4 % (ref 0–13.4)
NEUTROPHILS # BLD AUTO: 1.69 K/UL (ref 2–7.15)
NEUTROPHILS NFR BLD: 32.5 % (ref 44–72)
NRBC # BLD AUTO: 0 K/UL
NRBC BLD-RTO: 0 /100 WBC
PLATELET # BLD AUTO: 195 K/UL (ref 164–446)
PMV BLD AUTO: 11.4 FL (ref 9–12.9)
POTASSIUM SERPL-SCNC: 4 MMOL/L (ref 3.6–5.5)
PROT SERPL-MCNC: 6.7 G/DL (ref 6–8.2)
RBC # BLD AUTO: 4.46 M/UL (ref 4.2–5.4)
SODIUM SERPL-SCNC: 136 MMOL/L (ref 135–145)
T4 FREE SERPL-MCNC: 1.56 NG/DL (ref 0.93–1.7)
TSH SERPL DL<=0.005 MIU/L-ACNC: 0.74 UIU/ML (ref 0.38–5.33)
WBC # BLD AUTO: 5.2 K/UL (ref 4.8–10.8)

## 2021-03-18 PROCEDURE — 84443 ASSAY THYROID STIM HORMONE: CPT

## 2021-03-18 PROCEDURE — 36415 COLL VENOUS BLD VENIPUNCTURE: CPT

## 2021-03-18 PROCEDURE — 80053 COMPREHEN METABOLIC PANEL: CPT

## 2021-03-18 PROCEDURE — 84439 ASSAY OF FREE THYROXINE: CPT

## 2021-03-18 PROCEDURE — 85025 COMPLETE CBC W/AUTO DIFF WBC: CPT

## 2021-03-22 DIAGNOSIS — D70.9 NEUTROPENIA, UNSPECIFIED TYPE (HCC): ICD-10-CM

## 2021-03-23 ENCOUNTER — HOSPITAL ENCOUNTER (OUTPATIENT)
Dept: LAB | Facility: MEDICAL CENTER | Age: 81
End: 2021-03-23
Attending: FAMILY MEDICINE
Payer: MEDICARE

## 2021-03-23 DIAGNOSIS — D70.9 NEUTROPENIA, UNSPECIFIED TYPE (HCC): ICD-10-CM

## 2021-03-23 PROCEDURE — 88185 FLOWCYTOMETRY/TC ADD-ON: CPT | Mod: 91

## 2021-03-23 PROCEDURE — 36415 COLL VENOUS BLD VENIPUNCTURE: CPT

## 2021-03-23 PROCEDURE — 88184 FLOWCYTOMETRY/ TC 1 MARKER: CPT

## 2021-03-25 LAB
ACUTE LEUKEMIA MARKERS SPEC-IMP: NORMAL
EVENTS COUNTED SPEC: 26 MARKERS
SOURCE 9121: NORMAL

## 2021-05-10 ENCOUNTER — PATIENT MESSAGE (OUTPATIENT)
Dept: MEDICAL GROUP | Facility: LAB | Age: 81
End: 2021-05-10

## 2021-06-08 ENCOUNTER — APPOINTMENT (RX ONLY)
Dept: URBAN - METROPOLITAN AREA CLINIC 4 | Facility: CLINIC | Age: 81
Setting detail: DERMATOLOGY
End: 2021-06-08

## 2021-06-08 DIAGNOSIS — D18.0 HEMANGIOMA: ICD-10-CM

## 2021-06-08 DIAGNOSIS — L82.1 OTHER SEBORRHEIC KERATOSIS: ICD-10-CM

## 2021-06-08 DIAGNOSIS — L81.4 OTHER MELANIN HYPERPIGMENTATION: ICD-10-CM

## 2021-06-08 DIAGNOSIS — D22 MELANOCYTIC NEVI: ICD-10-CM

## 2021-06-08 DIAGNOSIS — L57.8 OTHER SKIN CHANGES DUE TO CHRONIC EXPOSURE TO NONIONIZING RADIATION: ICD-10-CM

## 2021-06-08 PROBLEM — D18.01 HEMANGIOMA OF SKIN AND SUBCUTANEOUS TISSUE: Status: ACTIVE | Noted: 2021-06-08

## 2021-06-08 PROBLEM — D22.5 MELANOCYTIC NEVI OF TRUNK: Status: ACTIVE | Noted: 2021-06-08

## 2021-06-08 PROCEDURE — 99213 OFFICE O/P EST LOW 20 MIN: CPT

## 2021-06-08 PROCEDURE — ? COUNSELING

## 2021-06-08 ASSESSMENT — LOCATION SIMPLE DESCRIPTION DERM
LOCATION SIMPLE: LEFT FOREARM
LOCATION SIMPLE: RIGHT THIGH
LOCATION SIMPLE: LEFT CHEEK
LOCATION SIMPLE: RIGHT CHEEK
LOCATION SIMPLE: RIGHT FOREARM
LOCATION SIMPLE: CHEST
LOCATION SIMPLE: RIGHT UPPER ARM
LOCATION SIMPLE: RIGHT UPPER BACK
LOCATION SIMPLE: LEFT UPPER ARM
LOCATION SIMPLE: LEFT THIGH

## 2021-06-08 ASSESSMENT — LOCATION DETAILED DESCRIPTION DERM
LOCATION DETAILED: RIGHT ANTERIOR PROXIMAL UPPER ARM
LOCATION DETAILED: LEFT ANTERIOR PROXIMAL UPPER ARM
LOCATION DETAILED: LEFT MEDIAL SUPERIOR CHEST
LOCATION DETAILED: RIGHT ANTERIOR DISTAL THIGH
LOCATION DETAILED: RIGHT SUPERIOR MEDIAL UPPER BACK
LOCATION DETAILED: LEFT DISTAL DORSAL FOREARM
LOCATION DETAILED: LEFT ANTERIOR DISTAL THIGH
LOCATION DETAILED: RIGHT CENTRAL MALAR CHEEK
LOCATION DETAILED: LEFT INFERIOR CENTRAL MALAR CHEEK
LOCATION DETAILED: RIGHT INFERIOR UPPER BACK
LOCATION DETAILED: RIGHT MID-UPPER BACK
LOCATION DETAILED: RIGHT DISTAL DORSAL FOREARM

## 2021-06-08 ASSESSMENT — LOCATION ZONE DERM
LOCATION ZONE: LEG
LOCATION ZONE: ARM
LOCATION ZONE: FACE
LOCATION ZONE: TRUNK

## 2021-06-15 ENCOUNTER — OFFICE VISIT (OUTPATIENT)
Dept: MEDICAL GROUP | Facility: LAB | Age: 81
End: 2021-06-15
Payer: MEDICARE

## 2021-06-15 VITALS
TEMPERATURE: 98.4 F | OXYGEN SATURATION: 98 % | RESPIRATION RATE: 16 BRPM | WEIGHT: 103 LBS | DIASTOLIC BLOOD PRESSURE: 60 MMHG | BODY MASS INDEX: 20.22 KG/M2 | HEART RATE: 68 BPM | HEIGHT: 60 IN | SYSTOLIC BLOOD PRESSURE: 110 MMHG

## 2021-06-15 DIAGNOSIS — R40.0 DAYTIME SOMNOLENCE: ICD-10-CM

## 2021-06-15 DIAGNOSIS — R71.8 MICROCYTOSIS: ICD-10-CM

## 2021-06-15 DIAGNOSIS — E03.4 HYPOTHYROIDISM DUE TO ACQUIRED ATROPHY OF THYROID: ICD-10-CM

## 2021-06-15 DIAGNOSIS — R53.83 OTHER FATIGUE: ICD-10-CM

## 2021-06-15 DIAGNOSIS — D70.8 OTHER NEUTROPENIA (HCC): ICD-10-CM

## 2021-06-15 DIAGNOSIS — G47.19 EXCESSIVE DAYTIME SLEEPINESS: ICD-10-CM

## 2021-06-15 DIAGNOSIS — E55.9 VITAMIN D DEFICIENCY: ICD-10-CM

## 2021-06-15 PROCEDURE — 99214 OFFICE O/P EST MOD 30 MIN: CPT | Performed by: FAMILY MEDICINE

## 2021-06-15 ASSESSMENT — FIBROSIS 4 INDEX: FIB4 SCORE: 2.66

## 2021-06-15 NOTE — PATIENT INSTRUCTIONS
SLEEP STUDY INSTRUCTIONS    1. Our main concern is to provide the best test and evaluation of your sleep and your cooperation in following the guidelines is very necessary.    2. We have no facilities for family members or guests at the sleep center. Special arrangements will be made for children requiring overnight sleep studies.    3. Unless otherwise instructed, AVOID alcoholic beverages on the day of your sleep study.    4. DO NOT drink coffee or caffeine-containing beverages after 12:00 noon on the day of your sleep study.    5. There is NO smoking at the sleep center.    6. Try to maintain a usual daytime schedule prior to the study (avoid unusual physical activity or meals).    7. DO NOT take a nap on the day of your study.    8. This is an outpatient procedure (test); therefore, nursing services, medications, and meals ARE NOT provided. If you take medications, bring them with you and take them on the schedule you do at home.    9. Please fill your sleep aid prescription (Ambien or Lunesta) and bring to your sleep study. Even patients who normally have no problem going to sleep often need a sleep aid in this different environment.    10. We ask that you wear conventional sleep attire (pajamas or sweats) for the sleep study. We discourage patients from wearing only their underwear to bed. We recommend two-piece pajamas as the techs will need to apply sensors to your stomach.    11. Please shampoo your hair the day of the sleep study. Please DO NOT use any other hair or skin products before your arrival (e.g., mousse, gel, hair or body spray, perfume, body lotion etc.) NOTE: Women should not wear heavy makeup prior to arrival as some wires are taped to the face area.    12. The technician will be applying several small electrodes to the scalp, eye area, chin, chest, and legs, plus respiratory effort belts around the chest. Also, there will be a device placed directly under the nose. (THIS WILL NOT OBSTRUCT  YOUR BREATHING.) This is a painless procedure and the skin is not broken.    13. The test is generally completed in six to eight hours; We are usually done between 6 - 7 a.m., unless you are scheduled for a nap study. You may need to come back another night for a second study to complete your treatment plan.    14. Patients who are scheduled for an MSLT (nap study) will stay at the sleep center for the day following their nighttime study. You will be notified if a nap study was ordered for you at the time the night study is scheduled. Generally, patients having a nap study will leave the sleep center by 4 p.m.    15. You will need to bring food for the following day if you are scheduled for a nap study. A refrigerator and microwave are available.    16. A bathroom is available for your use.    17. We are able to adjust the room temperature for your comfort. Please let the technologist know if you are uncomfortable during the study.    18. If you sleep better with a special pillow or stuffed animal, you may bring it along. Service animals are the only live animals permitted.    19. Cable T.V. is available.    20. You will be scheduled for a follow-up appointment three to five days after the sleep study to review your results.    21. A copy of your sleep study is sent to the referring physician approximately two weeks after your study.    22. Any questions can be directed to our staff at [phone].    23. If CPAP therapy is applied, a home unit will be ordered for you through the [employer] company. You will be contacted to schedule delivery after insurance authorization.

## 2021-06-15 NOTE — PROGRESS NOTES
Subjective:     Chief Complaint   Patient presents with   • Fatigue       Cristin Elizalde is a 80 y.o. female here today for evaluation and management of:    Other fatigue  Patient complains of worsening fatigue,  Sleeping 8 hours at night and does not wake rested and then one 2 hour nap daily.  Get's worn out with minor chores.  She used to walk regularly and now does not have the energy for it.  It has been worsening over the last 6 months.  She denies any loss of appetite or weight loss.  No fever or chills.  No black or bloody stools.     STOPBANG - Sleep Apnea Screening      Most Recent Value   S - Have you been told that you SNORE?  Yes   T - Are you often TIRED during the day?  Yes   O - Do you know if you stop breathing or has anyone witnessed you stop breathing while you were asleep? (OBSTRUCTION)  No   P - Do you have high blood PRESSURE or on medication to control high blood pressure?  No   B - Is your Body Mass Index greater than 35? (BMI)  No   A - Are you 50 years old or older? (AGE)  Yes   N - Are you a male with a NECK circumference greater than 17 inches, or a female with a neck circumference greater than 16 inches?  No   G - Are you male? (GENDER)  No   STOPBANG Total Score  3   JOLYNN Risk  Intermediate Risk          Allergies   Allergen Reactions   • Boniva [Ibandronic Acid] Unspecified     Had jaw necrosis.  Also had same reaction on Fosamax   • Clindamycin Hives   • Ibuprofen Anaphylaxis   • Penicillins Hives   • Tape Hives       Current medicines (including changes today)  Current Outpatient Medications   Medication Sig Dispense Refill   • sulfamethoxazole-trimethoprim (BACTRIM DS) 800-160 MG tablet Take 1 tablet by mouth 2 times a day. 20 tablet 0   • traZODone (DESYREL) 50 MG Tab Take 1 Tab by mouth every bedtime. 90 Tab 3   • donepezil (ARICEPT) 10 MG tablet Take 1 Tab by mouth every day. 90 Tab 3   • levothyroxine (SYNTHROID) 50 MCG Tab TAKE 1 TABLET ONCE DAILY INTHE MORNING ON AN EMPTY    STOMACH  90 Tab 3   • vitamin D (CHOLECALCIFEROL) 1000 UNIT Tab Take 1,000 Units by mouth every day.     • MULTIVITAMINS PO every day.        No current facility-administered medications for this visit.       She  has a past medical history of Arthritis, ASTHMA, Hypothyroidism (acquired), Osteoporosis, and Pain. She also has no past medical history of Encounter for long-term (current) use of other medications.    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  No nausea or vomiting.  No chest pain or palpitations.  No cough or SOB.  No pain with urination or hematuria.  No black or bloody stools.       Objective:     /60 (BP Location: Right arm, Patient Position: Sitting, BP Cuff Size: Adult)   Pulse 68   Temp 36.9 °C (98.4 °F) (Temporal)   Resp 16   Ht 1.524 m (5')   Wt 46.7 kg (103 lb)   SpO2 98%  Body mass index is 20.12 kg/m².   Physical Exam:  Well developed, well nourished.  Alert, oriented in no acute distress.  Eye contact is good, speech goal directed, affect calm  Eyes: conjunctiva non-injected, sclera non-icteric.  Ears: Pinna normal. TM pearly gray.   Neck Supple.  No adenopathy or masses in the neck or supraclavicular regions. No thyromegaly  Lungs: clear to auscultation bilaterally with good excursion. No wheezes or rhonchi  CV: regular rate and rhythm. No murmur  Abdomen: soft, nontender, no masses or organomegaly.  No rebound or guarding            Assessment and Plan:   The following treatment plan was discussed    1. Other  fatigue  Check labs.  Await results.  Refer to pulmonary medicine to rule out sleep apnea.  Patient is at moderate risk for sleep apnea  - CBC WITH DIFFERENTIAL; Future  - Comp Metabolic Panel; Future  - VITAMIN B12; Future  - REFERRAL TO PULMONARY AND SLEEP MEDICINE    2. Daytime somnolence  Refer to pulmonary medicine to rule out sleep apnea.  Patient is at moderate risk for sleep apnea  - CBC WITH DIFFERENTIAL; Future  - Comp Metabolic Panel; Future  - VITAMIN B12; Future    3. Hypothyroidism due to acquired atrophy of thyroid  Check thyroid labs.  Adjust levothyroxine dose as needed  - TSH; Future  - FREE THYROXINE; Future    4. Other neutropenia (HCC)  Recheck CBC.  Patient had a negative flow cytometry for leukemia and lymphoma  - CBC WITH DIFFERENTIAL; Future    5. Vitamin D deficiency  Check vitamin D level and adjust supplementation as needed  - VITAMIN D,25 HYDROXY; Future    6. Microcytosis  Check for etiologies.  Await results  - VITAMIN B12; Future  - IRON/TOTAL IRON BIND; Future  - FERRITIN; Future    7. Excessive daytime sleepiness  Refer to pulmonary medicine to rule out sleep apnea.  Patient is at moderate risk for sleep apnea  - REFERRAL TO PULMONARY AND SLEEP MEDICINE    Any change or worsening of signs or symptoms, patient encouraged to follow-up or report to the emergency room for further evaluation. Patient understands and agrees.    Followup: Return in about 3 months (around 9/15/2021).

## 2021-06-15 NOTE — ASSESSMENT & PLAN NOTE
Patient complains of worsening fatigue,  Sleeping 8 hours at night and does not wake rested and then one 2 hour nap daily.  Get's worn out with minor chores.  She used to walk regularly and now does not have the energy for it.  It has been worsening over the last 6 months.  She denies any loss of appetite or weight loss.  No fever or chills.  No black or bloody stools.

## 2021-06-16 ENCOUNTER — HOSPITAL ENCOUNTER (OUTPATIENT)
Dept: LAB | Facility: MEDICAL CENTER | Age: 81
End: 2021-06-16
Attending: FAMILY MEDICINE
Payer: MEDICARE

## 2021-06-16 DIAGNOSIS — E03.4 HYPOTHYROIDISM DUE TO ACQUIRED ATROPHY OF THYROID: ICD-10-CM

## 2021-06-16 DIAGNOSIS — R53.83 OTHER FATIGUE: ICD-10-CM

## 2021-06-16 DIAGNOSIS — R40.0 DAYTIME SOMNOLENCE: ICD-10-CM

## 2021-06-16 DIAGNOSIS — D70.8 OTHER NEUTROPENIA (HCC): ICD-10-CM

## 2021-06-16 DIAGNOSIS — R71.8 MICROCYTOSIS: ICD-10-CM

## 2021-06-16 DIAGNOSIS — E55.9 VITAMIN D DEFICIENCY: ICD-10-CM

## 2021-06-16 LAB
25(OH)D3 SERPL-MCNC: 75 NG/ML (ref 30–100)
ALBUMIN SERPL BCP-MCNC: 4.2 G/DL (ref 3.2–4.9)
ALBUMIN/GLOB SERPL: 1.6 G/DL
ALP SERPL-CCNC: 50 U/L (ref 30–99)
ALT SERPL-CCNC: 14 U/L (ref 2–50)
ANION GAP SERPL CALC-SCNC: 9 MMOL/L (ref 7–16)
AST SERPL-CCNC: 25 U/L (ref 12–45)
BASOPHILS # BLD AUTO: 0.7 % (ref 0–1.8)
BASOPHILS # BLD: 0.03 K/UL (ref 0–0.12)
BILIRUB SERPL-MCNC: 0.7 MG/DL (ref 0.1–1.5)
BUN SERPL-MCNC: 13 MG/DL (ref 8–22)
CALCIUM SERPL-MCNC: 9.8 MG/DL (ref 8.5–10.5)
CHLORIDE SERPL-SCNC: 104 MMOL/L (ref 96–112)
CO2 SERPL-SCNC: 28 MMOL/L (ref 20–33)
CREAT SERPL-MCNC: 0.72 MG/DL (ref 0.5–1.4)
EOSINOPHIL # BLD AUTO: 0.04 K/UL (ref 0–0.51)
EOSINOPHIL NFR BLD: 0.9 % (ref 0–6.9)
ERYTHROCYTE [DISTWIDTH] IN BLOOD BY AUTOMATED COUNT: 48.4 FL (ref 35.9–50)
FASTING STATUS PATIENT QL REPORTED: NORMAL
FERRITIN SERPL-MCNC: 117 NG/ML (ref 10–291)
GLOBULIN SER CALC-MCNC: 2.6 G/DL (ref 1.9–3.5)
GLUCOSE SERPL-MCNC: 84 MG/DL (ref 65–99)
HCT VFR BLD AUTO: 44.2 % (ref 37–47)
HGB BLD-MCNC: 14.4 G/DL (ref 12–16)
IMM GRANULOCYTES # BLD AUTO: 0.01 K/UL (ref 0–0.11)
IMM GRANULOCYTES NFR BLD AUTO: 0.2 % (ref 0–0.9)
IRON SATN MFR SERPL: 57 % (ref 15–55)
IRON SERPL-MCNC: 165 UG/DL (ref 40–170)
LYMPHOCYTES # BLD AUTO: 1.72 K/UL (ref 1–4.8)
LYMPHOCYTES NFR BLD: 37.6 % (ref 22–41)
MCH RBC QN AUTO: 31.3 PG (ref 27–33)
MCHC RBC AUTO-ENTMCNC: 32.6 G/DL (ref 33.6–35)
MCV RBC AUTO: 96.1 FL (ref 81.4–97.8)
MONOCYTES # BLD AUTO: 0.43 K/UL (ref 0–0.85)
MONOCYTES NFR BLD AUTO: 9.4 % (ref 0–13.4)
NEUTROPHILS # BLD AUTO: 2.34 K/UL (ref 2–7.15)
NEUTROPHILS NFR BLD: 51.2 % (ref 44–72)
NRBC # BLD AUTO: 0 K/UL
NRBC BLD-RTO: 0 /100 WBC
PLATELET # BLD AUTO: 214 K/UL (ref 164–446)
PMV BLD AUTO: 11.7 FL (ref 9–12.9)
POTASSIUM SERPL-SCNC: 4.3 MMOL/L (ref 3.6–5.5)
PROT SERPL-MCNC: 6.8 G/DL (ref 6–8.2)
RBC # BLD AUTO: 4.6 M/UL (ref 4.2–5.4)
SODIUM SERPL-SCNC: 141 MMOL/L (ref 135–145)
T4 FREE SERPL-MCNC: 1.57 NG/DL (ref 0.93–1.7)
TIBC SERPL-MCNC: 290 UG/DL (ref 250–450)
TSH SERPL DL<=0.005 MIU/L-ACNC: 2.04 UIU/ML (ref 0.38–5.33)
UIBC SERPL-MCNC: 125 UG/DL (ref 110–370)
VIT B12 SERPL-MCNC: 1264 PG/ML (ref 211–911)
WBC # BLD AUTO: 4.6 K/UL (ref 4.8–10.8)

## 2021-06-16 PROCEDURE — 84439 ASSAY OF FREE THYROXINE: CPT

## 2021-06-16 PROCEDURE — 83550 IRON BINDING TEST: CPT

## 2021-06-16 PROCEDURE — 80053 COMPREHEN METABOLIC PANEL: CPT

## 2021-06-16 PROCEDURE — 36415 COLL VENOUS BLD VENIPUNCTURE: CPT

## 2021-06-16 PROCEDURE — 85025 COMPLETE CBC W/AUTO DIFF WBC: CPT

## 2021-06-16 PROCEDURE — 84443 ASSAY THYROID STIM HORMONE: CPT

## 2021-06-16 PROCEDURE — 82728 ASSAY OF FERRITIN: CPT

## 2021-06-16 PROCEDURE — 82306 VITAMIN D 25 HYDROXY: CPT

## 2021-06-16 PROCEDURE — 82607 VITAMIN B-12: CPT

## 2021-06-16 PROCEDURE — 83540 ASSAY OF IRON: CPT

## 2021-12-08 ENCOUNTER — APPOINTMENT (RX ONLY)
Dept: URBAN - METROPOLITAN AREA CLINIC 4 | Facility: CLINIC | Age: 81
Setting detail: DERMATOLOGY
End: 2021-12-08

## 2021-12-08 DIAGNOSIS — L82.1 OTHER SEBORRHEIC KERATOSIS: ICD-10-CM

## 2021-12-08 DIAGNOSIS — L81.4 OTHER MELANIN HYPERPIGMENTATION: ICD-10-CM

## 2021-12-08 DIAGNOSIS — L57.8 OTHER SKIN CHANGES DUE TO CHRONIC EXPOSURE TO NONIONIZING RADIATION: ICD-10-CM

## 2021-12-08 DIAGNOSIS — D22 MELANOCYTIC NEVI: ICD-10-CM

## 2021-12-08 DIAGNOSIS — D18.0 HEMANGIOMA: ICD-10-CM

## 2021-12-08 PROBLEM — D18.01 HEMANGIOMA OF SKIN AND SUBCUTANEOUS TISSUE: Status: ACTIVE | Noted: 2021-12-08

## 2021-12-08 PROBLEM — D22.5 MELANOCYTIC NEVI OF TRUNK: Status: ACTIVE | Noted: 2021-12-08

## 2021-12-08 PROCEDURE — ? COUNSELING

## 2021-12-08 PROCEDURE — 99213 OFFICE O/P EST LOW 20 MIN: CPT

## 2021-12-08 ASSESSMENT — LOCATION ZONE DERM
LOCATION ZONE: LEG
LOCATION ZONE: ARM
LOCATION ZONE: TRUNK
LOCATION ZONE: FACE

## 2021-12-08 ASSESSMENT — LOCATION SIMPLE DESCRIPTION DERM
LOCATION SIMPLE: RIGHT THIGH
LOCATION SIMPLE: RIGHT UPPER ARM
LOCATION SIMPLE: LEFT THIGH
LOCATION SIMPLE: LEFT CHEEK
LOCATION SIMPLE: RIGHT FOREARM
LOCATION SIMPLE: LEFT FOREARM
LOCATION SIMPLE: LEFT UPPER ARM
LOCATION SIMPLE: RIGHT UPPER BACK
LOCATION SIMPLE: CHEST
LOCATION SIMPLE: RIGHT CHEEK

## 2021-12-08 ASSESSMENT — LOCATION DETAILED DESCRIPTION DERM
LOCATION DETAILED: RIGHT ANTERIOR DISTAL THIGH
LOCATION DETAILED: RIGHT CENTRAL MALAR CHEEK
LOCATION DETAILED: RIGHT INFERIOR UPPER BACK
LOCATION DETAILED: RIGHT DISTAL DORSAL FOREARM
LOCATION DETAILED: LEFT DISTAL DORSAL FOREARM
LOCATION DETAILED: RIGHT ANTERIOR PROXIMAL UPPER ARM
LOCATION DETAILED: RIGHT SUPERIOR MEDIAL UPPER BACK
LOCATION DETAILED: LEFT MEDIAL SUPERIOR CHEST
LOCATION DETAILED: LEFT ANTERIOR DISTAL THIGH
LOCATION DETAILED: LEFT ANTERIOR PROXIMAL UPPER ARM
LOCATION DETAILED: LEFT INFERIOR CENTRAL MALAR CHEEK
LOCATION DETAILED: RIGHT MID-UPPER BACK

## 2022-02-02 PROBLEM — M19.011 OSTEOARTHRITIS OF RIGHT SHOULDER REGION: Status: ACTIVE | Noted: 2022-02-02

## 2022-04-11 DIAGNOSIS — F51.01 PRIMARY INSOMNIA: ICD-10-CM

## 2022-04-11 DIAGNOSIS — R41.3 MEMORY CHANGES: ICD-10-CM

## 2022-04-12 RX ORDER — LEVOTHYROXINE SODIUM 0.05 MG/1
TABLET ORAL
Qty: 90 TABLET | Refills: 3 | Status: SHIPPED | OUTPATIENT
Start: 2022-04-12 | End: 2022-07-14 | Stop reason: SDUPTHER

## 2022-04-12 RX ORDER — DONEPEZIL HYDROCHLORIDE 10 MG/1
TABLET, FILM COATED ORAL
Qty: 90 TABLET | Refills: 3 | Status: SHIPPED | OUTPATIENT
Start: 2022-04-12 | End: 2022-07-14 | Stop reason: SDUPTHER

## 2022-04-12 RX ORDER — TRAZODONE HYDROCHLORIDE 50 MG/1
TABLET ORAL
Qty: 90 TABLET | Refills: 3 | Status: SHIPPED | OUTPATIENT
Start: 2022-04-12 | End: 2022-07-14 | Stop reason: SDUPTHER

## 2022-04-12 NOTE — TELEPHONE ENCOUNTER
Received request via: Pharmacy    Was the patient seen in the last year in this department? Yes  6/15/2021  Does the patient have an active prescription (recently filled or refills available) for medication(s) requested? No

## 2022-04-19 ENCOUNTER — APPOINTMENT (OUTPATIENT)
Dept: ADMISSIONS | Facility: MEDICAL CENTER | Age: 82
End: 2022-04-19
Payer: MEDICARE

## 2022-04-25 ENCOUNTER — PRE-ADMISSION TESTING (OUTPATIENT)
Dept: ADMISSIONS | Facility: MEDICAL CENTER | Age: 82
End: 2022-04-25
Attending: ORTHOPAEDIC SURGERY
Payer: MEDICARE

## 2022-04-25 DIAGNOSIS — Z01.810 PRE-OPERATIVE CARDIOVASCULAR EXAMINATION: ICD-10-CM

## 2022-04-25 DIAGNOSIS — Z01.812 PRE-OPERATIVE LABORATORY EXAMINATION: ICD-10-CM

## 2022-04-25 DIAGNOSIS — Z01.818 PREOP EXAMINATION: ICD-10-CM

## 2022-04-25 LAB
ANION GAP SERPL CALC-SCNC: 8 MMOL/L (ref 7–16)
BUN SERPL-MCNC: 13 MG/DL (ref 8–22)
CALCIUM SERPL-MCNC: 9.8 MG/DL (ref 8.4–10.2)
CHLORIDE SERPL-SCNC: 102 MMOL/L (ref 96–112)
CO2 SERPL-SCNC: 29 MMOL/L (ref 20–33)
CREAT SERPL-MCNC: 0.71 MG/DL (ref 0.5–1.4)
EKG IMPRESSION: NORMAL
ERYTHROCYTE [DISTWIDTH] IN BLOOD BY AUTOMATED COUNT: 46.6 FL (ref 35.9–50)
GFR SERPLBLD CREATININE-BSD FMLA CKD-EPI: 86 ML/MIN/1.73 M 2
GLUCOSE SERPL-MCNC: 82 MG/DL (ref 65–99)
HCT VFR BLD AUTO: 37.1 % (ref 37–47)
HGB BLD-MCNC: 12.2 G/DL (ref 12–16)
MCH RBC QN AUTO: 31.1 PG (ref 27–33)
MCHC RBC AUTO-ENTMCNC: 32.9 G/DL (ref 33.6–35)
MCV RBC AUTO: 94.6 FL (ref 81.4–97.8)
PLATELET # BLD AUTO: 208 K/UL (ref 164–446)
PMV BLD AUTO: 11.1 FL (ref 9–12.9)
POTASSIUM SERPL-SCNC: 4.2 MMOL/L (ref 3.6–5.5)
RBC # BLD AUTO: 3.92 M/UL (ref 4.2–5.4)
SODIUM SERPL-SCNC: 139 MMOL/L (ref 135–145)
WBC # BLD AUTO: 4.9 K/UL (ref 4.8–10.8)

## 2022-04-25 PROCEDURE — 36415 COLL VENOUS BLD VENIPUNCTURE: CPT

## 2022-04-25 PROCEDURE — 85027 COMPLETE CBC AUTOMATED: CPT

## 2022-04-25 PROCEDURE — 87641 MR-STAPH DNA AMP PROBE: CPT

## 2022-04-25 PROCEDURE — 87640 STAPH A DNA AMP PROBE: CPT | Mod: XU

## 2022-04-25 PROCEDURE — 80048 BASIC METABOLIC PNL TOTAL CA: CPT

## 2022-04-25 PROCEDURE — 93005 ELECTROCARDIOGRAM TRACING: CPT

## 2022-04-25 PROCEDURE — 93010 ELECTROCARDIOGRAM REPORT: CPT | Performed by: INTERNAL MEDICINE

## 2022-04-25 ASSESSMENT — FIBROSIS 4 INDEX: FIB4 SCORE: 2.47

## 2022-04-25 NOTE — PREPROCEDURE INSTRUCTIONS
"Pre-admit appointment completed. \"Preparing for your procedure\" sheet given to pt along with verbal and written instructions. Pt instructed to continue regularly prescribed medications through the day before surgery. Pt instructed to take the following medications the day of surgery with a sip of water, per anesthesia protocol; synthroid, and if needed-albuterol MDI (to bring DOS).    MET's=>4.  "

## 2022-04-25 NOTE — DISCHARGE PLANNING
DISCHARGE PLANNING NOTE - TOTAL JOINT    Procedure: Procedure(s):  Right reverse total shoulder arthroplasty, hardware removal and repairs as indicated  REMOVAL, HARDWARE  Procedure Date: 5/10/2022  Insurance: Payor: MEDICARE / Plan: MEDICARE PART A & B / Product Type: *No Product type* /    Equipment currently available at home?  shower chair  Steps into the home? 0  Steps within the home? 1 flight  Toilet height? Standard  Type of shower? walk-in shower  Who will be with you during your recovery? Spouse.  Is Outpatient Physical Therapy set up after surgery? Yes  Did you take the Total Joint Class and where? Yes  Planning same day discharge?Yes     This writer met with pt during her preadmission appt. Home safety checklist reviewed and copy given to pt. Pt educated to dc criteria. All questions answered and verbalizes understanding of all instructions. No dc needs identified at this time. Anticipate dc to home without barriers.

## 2022-04-26 LAB
SCCMEC + MECA PNL NOSE NAA+PROBE: NEGATIVE
SCCMEC + MECA PNL NOSE NAA+PROBE: NEGATIVE

## 2022-05-04 ENCOUNTER — APPOINTMENT (OUTPATIENT)
Dept: ADMISSIONS | Facility: MEDICAL CENTER | Age: 82
End: 2022-05-04
Payer: MEDICARE

## 2022-05-09 NOTE — H&P (VIEW-ONLY)
"Subjective   Patient ID:  Cristin Elizalde is a 81 y.o. female.    Chief Complaint:  Pain and Follow-Up of the Right Shoulder      HPI:    Patient comes in today for evaluation of her right shoulder anticipation of reverse total shoulder arthroplasty scheduled for tomorrow.  Patient has continued with weakness and pain in combination with loss of range of motion of the shoulder.  She feels that she is failed nonsurgical treatment.    Review of Systems    Past Medical History:   Diagnosis Date   • Right shoulder pain 04/25/2022    constant dull ache, and positional pain.   • Arthritis     Osteo   • ASTHMA     rare use of inhaler   • Hypothyroidism (acquired)    • Insomnia    • Memory loss     \"a little bit\"    • Osteoporosis    • Pain    • Seasonal allergies         has a current medication list which includes the following prescription(s): albuterol sulfate, trazodone, levothyroxine, donepezil, fluticasone, ipratropium, vitamin d, and multiple vitamin.     Past Surgical History:   Procedure Laterality Date   • SHOULDER DECOMPRESSION ARTHROSCOPIC  10/23/08    Performed by ADDY VILLEGAS at SURGERY [facility] ORS   • SHOULDER ARTHROSCOPY W/ ROTATOR CUFF REPAIR  10/23/08    Performed by ADDY VILLEGAS at SURGERY [facility] ORS   • CLAVICLE DISTAL EXCISION  10/23/08    Performed by ADDY VILLEGAS at [facility] ORS   • SEPTOPLASTY  2008    sinus surg   • CARPAL TUNNEL REL Right 2005   • BUNIONECTOMY Right 1999   • BLADDER SUSPENSION  1995    surg for incontinence   • TUBAL LIGATION  1970   • APPENDECTOMY  1952   • COLONOSCOPY  1990's        Objective   General: Patient awake, alert, oriented, and answers questions appropriately    Ortho Exam:    Previous examination of the shoulder shows gross rotator cuff weakness with isolation.  She remains neurovascularly.  She has limited range of motion in all planes.  She is good range of motion the wrist and elbow.    Imaging:  X-rays taken prior of the right shoulder show " high riding humeral head evidence of metallic anchors within the humeral head from prior rotator cuff repair.    Assessment:    Right shoulder rotator cuff arthropathy with weakness and pain    Plan:    Risk, benefits, alternatives were discussed regarding treatment options.  Patient was recommended to undergo reverse total shoulder arthroplasty.  All intraoperative, perioperative, and postoperative questions were answered for the patient.  She will take Tylenol and Advil for postoperative pain.    No follow-ups on file.    Please note that this dictation was created using voice recognition software.  I have made every reasonable attempt to correct obvious errors, but I expect that there are errors of grammar and possibly content that I did not discover before finalizing the note.

## 2022-05-10 ENCOUNTER — ANESTHESIA (OUTPATIENT)
Dept: SURGERY | Facility: MEDICAL CENTER | Age: 82
End: 2022-05-10
Payer: MEDICARE

## 2022-05-10 ENCOUNTER — ANESTHESIA EVENT (OUTPATIENT)
Dept: SURGERY | Facility: MEDICAL CENTER | Age: 82
End: 2022-05-10
Payer: MEDICARE

## 2022-05-10 ENCOUNTER — HOSPITAL ENCOUNTER (OUTPATIENT)
Facility: MEDICAL CENTER | Age: 82
End: 2022-05-10
Attending: ORTHOPAEDIC SURGERY | Admitting: ORTHOPAEDIC SURGERY
Payer: MEDICARE

## 2022-05-10 VITALS
OXYGEN SATURATION: 96 % | SYSTOLIC BLOOD PRESSURE: 132 MMHG | WEIGHT: 108.69 LBS | TEMPERATURE: 98.4 F | HEART RATE: 60 BPM | DIASTOLIC BLOOD PRESSURE: 62 MMHG | BODY MASS INDEX: 21.34 KG/M2 | HEIGHT: 60 IN | RESPIRATION RATE: 16 BRPM

## 2022-05-10 DIAGNOSIS — M19.011 PRIMARY OSTEOARTHRITIS OF RIGHT SHOULDER: ICD-10-CM

## 2022-05-10 PROCEDURE — C1776 JOINT DEVICE (IMPLANTABLE): HCPCS | Performed by: ORTHOPAEDIC SURGERY

## 2022-05-10 PROCEDURE — 20680 REMOVAL OF IMPLANT DEEP: CPT | Mod: RT | Performed by: ORTHOPAEDIC SURGERY

## 2022-05-10 PROCEDURE — 01638 ANES OPN/ARTHR TOT SHO RPLCM: CPT | Performed by: ANESTHESIOLOGY

## 2022-05-10 PROCEDURE — 501838 HCHG SUTURE GENERAL: Performed by: ORTHOPAEDIC SURGERY

## 2022-05-10 PROCEDURE — 160025 RECOVERY II MINUTES (STATS): Performed by: ORTHOPAEDIC SURGERY

## 2022-05-10 PROCEDURE — 23472 RECONSTRUCT SHOULDER JOINT: CPT | Mod: RT | Performed by: ORTHOPAEDIC SURGERY

## 2022-05-10 PROCEDURE — 20680 REMOVAL OF IMPLANT DEEP: CPT | Mod: ASROC,RT | Performed by: PHYSICIAN ASSISTANT

## 2022-05-10 PROCEDURE — 97535 SELF CARE MNGMENT TRAINING: CPT

## 2022-05-10 PROCEDURE — 502000 HCHG MISC OR IMPLANTS RC 0278: Performed by: ORTHOPAEDIC SURGERY

## 2022-05-10 PROCEDURE — 700101 HCHG RX REV CODE 250: Performed by: ORTHOPAEDIC SURGERY

## 2022-05-10 PROCEDURE — 64415 NJX AA&/STRD BRCH PLXS IMG: CPT | Mod: 59 | Performed by: ANESTHESIOLOGY

## 2022-05-10 PROCEDURE — 160048 HCHG OR STATISTICAL LEVEL 1-5: Performed by: ORTHOPAEDIC SURGERY

## 2022-05-10 PROCEDURE — 99100 ANES PT EXTEME AGE<1 YR&>70: CPT | Performed by: ANESTHESIOLOGY

## 2022-05-10 PROCEDURE — 23430 REPAIR BICEPS TENDON: CPT | Mod: ASROC,59 | Performed by: PHYSICIAN ASSISTANT

## 2022-05-10 PROCEDURE — 76942 ECHO GUIDE FOR BIOPSY: CPT | Mod: 26 | Performed by: ANESTHESIOLOGY

## 2022-05-10 PROCEDURE — 160046 HCHG PACU - 1ST 60 MINS PHASE II: Performed by: ORTHOPAEDIC SURGERY

## 2022-05-10 PROCEDURE — 64415 NJX AA&/STRD BRCH PLXS IMG: CPT | Performed by: ORTHOPAEDIC SURGERY

## 2022-05-10 PROCEDURE — 160009 HCHG ANES TIME/MIN: Performed by: ORTHOPAEDIC SURGERY

## 2022-05-10 PROCEDURE — 97165 OT EVAL LOW COMPLEX 30 MIN: CPT

## 2022-05-10 PROCEDURE — 160029 HCHG SURGERY MINUTES - 1ST 30 MINS LEVEL 4: Performed by: ORTHOPAEDIC SURGERY

## 2022-05-10 PROCEDURE — 700101 HCHG RX REV CODE 250: Performed by: ANESTHESIOLOGY

## 2022-05-10 PROCEDURE — 160002 HCHG RECOVERY MINUTES (STAT): Performed by: ORTHOPAEDIC SURGERY

## 2022-05-10 PROCEDURE — 160035 HCHG PACU - 1ST 60 MINS PHASE I: Performed by: ORTHOPAEDIC SURGERY

## 2022-05-10 PROCEDURE — 160041 HCHG SURGERY MINUTES - EA ADDL 1 MIN LEVEL 4: Performed by: ORTHOPAEDIC SURGERY

## 2022-05-10 PROCEDURE — 700105 HCHG RX REV CODE 258: Performed by: ANESTHESIOLOGY

## 2022-05-10 PROCEDURE — 700111 HCHG RX REV CODE 636 W/ 250 OVERRIDE (IP): Performed by: ANESTHESIOLOGY

## 2022-05-10 PROCEDURE — 502240 HCHG MISC OR SUPPLY RC 0272: Performed by: ORTHOPAEDIC SURGERY

## 2022-05-10 PROCEDURE — C1713 ANCHOR/SCREW BN/BN,TIS/BN: HCPCS | Performed by: ORTHOPAEDIC SURGERY

## 2022-05-10 PROCEDURE — 23472 RECONSTRUCT SHOULDER JOINT: CPT | Mod: ASROC,RT | Performed by: PHYSICIAN ASSISTANT

## 2022-05-10 PROCEDURE — 23430 REPAIR BICEPS TENDON: CPT | Mod: 59 | Performed by: ORTHOPAEDIC SURGERY

## 2022-05-10 DEVICE — IMPLANTABLE DEVICE: Type: IMPLANTABLE DEVICE | Site: SHOULDER | Status: FUNCTIONAL

## 2022-05-10 RX ORDER — SODIUM CHLORIDE, SODIUM GLUCONATE, SODIUM ACETATE, POTASSIUM CHLORIDE AND MAGNESIUM CHLORIDE 526; 502; 368; 37; 30 MG/100ML; MG/100ML; MG/100ML; MG/100ML; MG/100ML
500 INJECTION, SOLUTION INTRAVENOUS CONTINUOUS
Status: DISCONTINUED | OUTPATIENT
Start: 2022-05-10 | End: 2022-05-10 | Stop reason: HOSPADM

## 2022-05-10 RX ORDER — MEPERIDINE HYDROCHLORIDE 25 MG/ML
12.5 INJECTION INTRAMUSCULAR; INTRAVENOUS; SUBCUTANEOUS
Status: DISCONTINUED | OUTPATIENT
Start: 2022-05-10 | End: 2022-05-10 | Stop reason: HOSPADM

## 2022-05-10 RX ORDER — HYDROMORPHONE HYDROCHLORIDE 1 MG/ML
0.2 INJECTION, SOLUTION INTRAMUSCULAR; INTRAVENOUS; SUBCUTANEOUS
Status: DISCONTINUED | OUTPATIENT
Start: 2022-05-10 | End: 2022-05-10 | Stop reason: HOSPADM

## 2022-05-10 RX ORDER — ROPIVACAINE HYDROCHLORIDE 5 MG/ML
INJECTION, SOLUTION EPIDURAL; INFILTRATION; PERINEURAL
Status: COMPLETED | OUTPATIENT
Start: 2022-05-10 | End: 2022-05-10

## 2022-05-10 RX ORDER — HYDROMORPHONE HYDROCHLORIDE 1 MG/ML
0.4 INJECTION, SOLUTION INTRAMUSCULAR; INTRAVENOUS; SUBCUTANEOUS
Status: DISCONTINUED | OUTPATIENT
Start: 2022-05-10 | End: 2022-05-10 | Stop reason: HOSPADM

## 2022-05-10 RX ORDER — SODIUM CHLORIDE, SODIUM LACTATE, POTASSIUM CHLORIDE, CALCIUM CHLORIDE 600; 310; 30; 20 MG/100ML; MG/100ML; MG/100ML; MG/100ML
INJECTION, SOLUTION INTRAVENOUS CONTINUOUS
Status: DISCONTINUED | OUTPATIENT
Start: 2022-05-10 | End: 2022-05-10 | Stop reason: HOSPADM

## 2022-05-10 RX ORDER — ONDANSETRON 2 MG/ML
4 INJECTION INTRAMUSCULAR; INTRAVENOUS
Status: DISCONTINUED | OUTPATIENT
Start: 2022-05-10 | End: 2022-05-10 | Stop reason: HOSPADM

## 2022-05-10 RX ORDER — MIDAZOLAM HYDROCHLORIDE 1 MG/ML
INJECTION INTRAMUSCULAR; INTRAVENOUS PRN
Status: DISCONTINUED | OUTPATIENT
Start: 2022-05-10 | End: 2022-05-10 | Stop reason: SURG

## 2022-05-10 RX ORDER — BUPIVACAINE HYDROCHLORIDE AND EPINEPHRINE 5; 5 MG/ML; UG/ML
INJECTION, SOLUTION EPIDURAL; INTRACAUDAL; PERINEURAL
Status: DISCONTINUED | OUTPATIENT
Start: 2022-05-10 | End: 2022-05-10 | Stop reason: HOSPADM

## 2022-05-10 RX ORDER — CEFAZOLIN SODIUM 1 G/3ML
INJECTION, POWDER, FOR SOLUTION INTRAMUSCULAR; INTRAVENOUS PRN
Status: DISCONTINUED | OUTPATIENT
Start: 2022-05-10 | End: 2022-05-10 | Stop reason: SURG

## 2022-05-10 RX ORDER — LIDOCAINE HYDROCHLORIDE 20 MG/ML
INJECTION, SOLUTION EPIDURAL; INFILTRATION; INTRACAUDAL; PERINEURAL PRN
Status: DISCONTINUED | OUTPATIENT
Start: 2022-05-10 | End: 2022-05-10 | Stop reason: SURG

## 2022-05-10 RX ORDER — OXYCODONE HCL 5 MG/5 ML
5 SOLUTION, ORAL ORAL
Status: DISCONTINUED | OUTPATIENT
Start: 2022-05-10 | End: 2022-05-10 | Stop reason: HOSPADM

## 2022-05-10 RX ORDER — HYDROMORPHONE HYDROCHLORIDE 1 MG/ML
0.6 INJECTION, SOLUTION INTRAMUSCULAR; INTRAVENOUS; SUBCUTANEOUS
Status: DISCONTINUED | OUTPATIENT
Start: 2022-05-10 | End: 2022-05-10 | Stop reason: HOSPADM

## 2022-05-10 RX ORDER — HYDRALAZINE HYDROCHLORIDE 20 MG/ML
5 INJECTION INTRAMUSCULAR; INTRAVENOUS
Status: DISCONTINUED | OUTPATIENT
Start: 2022-05-10 | End: 2022-05-10 | Stop reason: HOSPADM

## 2022-05-10 RX ORDER — ROCURONIUM BROMIDE 10 MG/ML
INJECTION, SOLUTION INTRAVENOUS PRN
Status: DISCONTINUED | OUTPATIENT
Start: 2022-05-10 | End: 2022-05-10 | Stop reason: SURG

## 2022-05-10 RX ORDER — PHENYLEPHRINE HCL IN 0.9% NACL 0.5 MG/5ML
SYRINGE (ML) INTRAVENOUS PRN
Status: DISCONTINUED | OUTPATIENT
Start: 2022-05-10 | End: 2022-05-10 | Stop reason: SURG

## 2022-05-10 RX ORDER — OXYCODONE HCL 5 MG/5 ML
10 SOLUTION, ORAL ORAL
Status: DISCONTINUED | OUTPATIENT
Start: 2022-05-10 | End: 2022-05-10 | Stop reason: HOSPADM

## 2022-05-10 RX ORDER — ONDANSETRON 2 MG/ML
INJECTION INTRAMUSCULAR; INTRAVENOUS PRN
Status: DISCONTINUED | OUTPATIENT
Start: 2022-05-10 | End: 2022-05-10 | Stop reason: SURG

## 2022-05-10 RX ORDER — DIPHENHYDRAMINE HYDROCHLORIDE 50 MG/ML
12.5 INJECTION INTRAMUSCULAR; INTRAVENOUS
Status: DISCONTINUED | OUTPATIENT
Start: 2022-05-10 | End: 2022-05-10 | Stop reason: HOSPADM

## 2022-05-10 RX ORDER — CEFAZOLIN SODIUM 1 G/3ML
2 INJECTION, POWDER, FOR SOLUTION INTRAMUSCULAR; INTRAVENOUS ONCE
Status: DISCONTINUED | OUTPATIENT
Start: 2022-05-10 | End: 2022-05-10 | Stop reason: HOSPADM

## 2022-05-10 RX ORDER — DEXAMETHASONE SODIUM PHOSPHATE 4 MG/ML
INJECTION, SOLUTION INTRA-ARTICULAR; INTRALESIONAL; INTRAMUSCULAR; INTRAVENOUS; SOFT TISSUE PRN
Status: DISCONTINUED | OUTPATIENT
Start: 2022-05-10 | End: 2022-05-10 | Stop reason: SURG

## 2022-05-10 RX ORDER — SODIUM CHLORIDE, SODIUM GLUCONATE, SODIUM ACETATE, POTASSIUM CHLORIDE AND MAGNESIUM CHLORIDE 526; 502; 368; 37; 30 MG/100ML; MG/100ML; MG/100ML; MG/100ML; MG/100ML
INJECTION, SOLUTION INTRAVENOUS
Status: DISCONTINUED | OUTPATIENT
Start: 2022-05-10 | End: 2022-05-10 | Stop reason: SURG

## 2022-05-10 RX ADMIN — DEXAMETHASONE SODIUM PHOSPHATE 4 MG: 4 INJECTION, SOLUTION INTRAMUSCULAR; INTRAVENOUS at 09:57

## 2022-05-10 RX ADMIN — Medication 100 MCG: at 08:49

## 2022-05-10 RX ADMIN — ROPIVACAINE HYDROCHLORIDE 20 ML: 5 INJECTION, SOLUTION EPIDURAL; INFILTRATION; PERINEURAL at 08:06

## 2022-05-10 RX ADMIN — EPHEDRINE SULFATE 10 MG: 50 INJECTION INTRAMUSCULAR; INTRAVENOUS; SUBCUTANEOUS at 08:45

## 2022-05-10 RX ADMIN — FENTANYL CITRATE 50 MCG: 50 INJECTION, SOLUTION INTRAMUSCULAR; INTRAVENOUS at 09:56

## 2022-05-10 RX ADMIN — SODIUM CHLORIDE, SODIUM GLUCONATE, SODIUM ACETATE, POTASSIUM CHLORIDE AND MAGNESIUM CHLORIDE: 526; 502; 368; 37; 30 INJECTION, SOLUTION INTRAVENOUS at 08:30

## 2022-05-10 RX ADMIN — ROCURONIUM BROMIDE 40 MG: 10 INJECTION, SOLUTION INTRAVENOUS at 08:32

## 2022-05-10 RX ADMIN — LIDOCAINE HYDROCHLORIDE 100 MG: 20 INJECTION, SOLUTION EPIDURAL; INFILTRATION; INTRACAUDAL; PERINEURAL at 08:07

## 2022-05-10 RX ADMIN — MIDAZOLAM HYDROCHLORIDE 2 MG: 1 INJECTION, SOLUTION INTRAMUSCULAR; INTRAVENOUS at 08:30

## 2022-05-10 RX ADMIN — CEFAZOLIN 2 G: 330 INJECTION, POWDER, FOR SOLUTION INTRAMUSCULAR; INTRAVENOUS at 08:36

## 2022-05-10 RX ADMIN — PROPOFOL 150 MG: 10 INJECTION, EMULSION INTRAVENOUS at 08:31

## 2022-05-10 RX ADMIN — ONDANSETRON 4 MG: 2 INJECTION INTRAMUSCULAR; INTRAVENOUS at 09:57

## 2022-05-10 RX ADMIN — SUGAMMADEX 200 MG: 100 INJECTION, SOLUTION INTRAVENOUS at 09:57

## 2022-05-10 RX ADMIN — FENTANYL CITRATE 50 MCG: 50 INJECTION, SOLUTION INTRAMUSCULAR; INTRAVENOUS at 08:31

## 2022-05-10 ASSESSMENT — GAIT ASSESSMENTS: DISTANCE (FEET): 150

## 2022-05-10 ASSESSMENT — FIBROSIS 4 INDEX: FIB4 SCORE: 2.54

## 2022-05-10 ASSESSMENT — PAIN SCALES - GENERAL: PAIN_LEVEL: 1

## 2022-05-10 ASSESSMENT — ACTIVITIES OF DAILY LIVING (ADL): TOILETING: INDEPENDENT

## 2022-05-10 NOTE — ANESTHESIA TIME REPORT
Anesthesia Start and Stop Event Times     Date Time Event    5/10/2022 0806 Ready for Procedure     0830 Anesthesia Start     1009 Anesthesia Stop        Responsible Staff  05/10/22    Name Role Begin End    Boston Valiente M.D. Anesth 0830 1009        Overtime Reason:  no overtime (within assigned shift)    Comments: RENEE 14th CALL

## 2022-05-10 NOTE — ANESTHESIA PREPROCEDURE EVALUATION
Case: [id number] Date/Time: 05/10/22 0815    Procedures:       Right reverse total shoulder arthroplasty, hardware removal and repairs as indicated (Right )      REMOVAL, HARDWARE    Diagnosis: Osteoarthritis of right shoulder region [M19.011]    Pre-op diagnosis: Osteoarthritis of right shoulder region [M19.011]    Location:  OR 04 / SURGERY [facility]    Surgeons: Boston Bower M.D.          Relevant Problems   ENDO   (positive) Hypothyroidism due to acquired atrophy of thyroid      Other   (positive) Seronegative erosive rheumatoid arthritis (HCC)       Physical Exam    Airway   Mallampati: II  TM distance: >3 FB  Neck ROM: full       Cardiovascular - normal exam  Rhythm: regular  Rate: normal  (-) murmur     Dental - normal exam           Pulmonary - normal exam  Breath sounds clear to auscultation     Abdominal    Neurological - normal exam                 Anesthesia Plan    ASA 2       Plan - general and peripheral nerve block     Peripheral nerve block will be post-op pain control  Airway plan will be ETT          Induction: intravenous    Postoperative Plan: Postoperative administration of opioids is intended.    Pertinent diagnostic labs and testing reviewed    Informed Consent:    Anesthetic plan and risks discussed with patient.    Use of blood products discussed with: patient whom consented to blood products.

## 2022-05-10 NOTE — OR NURSING
1053 Pt arrived from PACU post   Right reverse total shoulder arthroplasty, hardware removal and repairs as indicated Right General   REMOVAL, HARDWARE Right     , report received. Pt states pain is tollerable and denies nausea at this time. Pt dressing @ BS with assistance.    1120 OT @ BS    1144 Discharge instructions and medications gone over with Pt and ride. All questions answered. Pt meets DC criteria. PIV DC'd. Pt transported to POV via WC in stable condition.

## 2022-05-10 NOTE — LETTER
February 4, 2022    Patient Name: Critsin Elizalde  Surgeon Name: Boston Bower M.D.  Surgery Facility: [facility] ([address] [facility])  Surgery Date: 5/10/2022    The time of your surgery is not final and may change up to and until the day of your surgery. You will be contacted 24-48 hours prior to your surgery date with your check-in and surgery time.    If you will not be at one of the below numbers please call/text the surgery scheduler at [phone]  Preferred Phone: [phone]    BEFORE YOUR SURGERY  Pre Registration and/or Lab Work must be done within and no earlier than 28 days prior to your surgery date. Please call [facility] at [phone] for an appointment as soon as possible.     Not scheduled at [facility] contact the scheduled facility for approved testing facilities.    Pre op Appointment:   Date: 05/09/2022   Time: 10:00 AM   Provider: Boston Bower M.D.   Location: [address] [place] [id number]  Instructions: Bring a list of all medications you are taking including the dosing and frequency.    Please refrain from smoking any substance after midnight prior to surgery. Smoking may interfere with the anesthetic and frequently produces nausea during the recovery period.    Continue taking all lifesaving medications. Including the morning of your surgery with small sip of water.    Please read the MEDICATION INSTRUCTIONS below completely.    DAY OF YOUR SURGERY  Nothing to eat or drink after midnight     Please arrive at the hospital/surgery center at the check-in time provided.     An adult will need to bring you and take you home after your surgery.             AFTER YOUR SURGERY  Post op Appointment:   Date: 05/23/2022   Time: 10:00 AM   With: Boston Bower M.D.   Location: [address] [place] [id number]    - Therapy- Your first appointment should be 2  week(s) after your surgery. For your convenience we have 4 Physical  Therapy locations: [facility] [place], and [facility]. Call our office ASAP to schedule an appointment at [phone] or take the enclosed Therapy Prescription to a facility of your choice.    TIME OFF WORK  FMLA or Disability forms can be faxed directly to: [phone] or you may drop them off at [address] [place] [address]. Our office charges a $35.00 fee per form. Forms will be completed within 10 business days of drop off and payment received. For the status of your forms you may contact our disability office directly [phone].    MEDICATION INSTRUCTIONS  The following medications should be stopped a minimum of 10 days prior to surgery:  All over the counter, Supplements & Herbal medications    Anorectics: Phentermine (Adipex-P, Lomaira and Suprenza), Phentermine-topiramate (Qsymia), Bupropion-naltrexone (Contrave)    Opiod Partial Agonists/Opioid Antagonists: Buprenorphine (Subocone, Belbuca, Butrans, Probuphine Implant, Sublocade), Naltrexone (ReVia, Vivitrol), Naloxone    Amphetamines: Dextroamphetamine/Amphetamine (Adderall, Mydayis), Methylphenidate Hydrochloride (Concerta, Metadate, Methylin, Ritalin)    The following medications should be stopped 5 days prior to surgery:  Blood Thinners: Any Aspirin, Aspirin products, anti-inflammatories such as ibuprofen and any blood thinners such as Coumadin and Plavix. Please consult your prescribing physician if you are on life saving blood thinners, in regards to when to stop medications prior to surgery.     The following medications should be stopped a minimum of 3 days prior to surgery:  PDE-5 inhibitors: Sildenafil (Viagra), Tadalafil (Cialis), Vardenafil (Levitra), Avanafil (Stendra)    MAO Inhibitors: Rasagiline (Azilect), Selegiline (Eldepryl, Emsam, Selapar), Isocarboxazid (Marplan), Phenelzine (Nardil)

## 2022-05-10 NOTE — THERAPY
Occupational Therapy   Initial Evaluation     Patient Name: Cristin Elizalde  Age:  81 y.o., Sex:  female  Medical Record #: [id number]  Today's Date: 5/10/2022     Precautions  Precautions: Non Weight Bearing Right Upper Extremity,Immobilizer Right Upper Extremity  Comments: s/p R rTSA with hardware removal    Assessment    Patient is 81 y.o. female with a diagnosis of R shoulder DJD, rotator cuff arthropathy, and prox biceps pathology with retained hardware from previous rot cuff repairs. She is now POD #0 R rTSA with prox biceps tenodesis and hardware removal. Additional factors influencing patient status / progress: HTN, RA. Pt lives with supportive spouse in a 2SH, MBD/BA on first floor. She was I with all ADLs/IADLs, ambulates without AD and is retired but still drives at [employer]. Pt and spouse were both educated in detail on ADL's after Right reverse Total Shoulder surgery.     Patient specifically educated on surgeon's post-operative precautions including pendulum exercises as prescribed by physician.    The following treatment were provided to pt during entire OT session:     Self-care/ADL training (17334)  How to adjust sling/immobilizer for best fit.  To keep sling positioned so hand is level or slightly above elbow to reduce pull of gravity on arm and maintain shoulder in neutral positioning.  How to don & doff sling/immobilizer safely and appropriately for dressing and bathing.  How to dress upper body while maintaining post surgical precautions.  How to shower safely.  How to appropriately cover incision for showering if needed prior to removal of staples.    Proper positioning of arm during showering.   Encouraged use of hand held shower (using the non-operative extremity) to keep water away from the incision site.    Safe shower and toilet transfers.  Teach back completed for UB dressing and donning/doffing immobilizer, with good recall and compliance by both pt and spouse.  Proper positioning while sleeping  "either in bed or recliner  Encouraged patient to support arm with pillows under forearm when sitting to reduce strain on the neck from the weight of the arm and immobilizer.   Proper bed mobility and transfer techniques while maintaining NWB on surgical arm.   Pain management education - around the clock, and icing for edema mgt.  Ambulation with pt in hallways with only HHA.    Pt and  both verbalized and demonstrated understanding of education provided. Teach back completed for UB dressing and donning/doffing immobilizer, with good recall and compliance.  Their home is adequately equipped with DMEs to aid her during recovery. All questions and concerns answered. No acute OT needs at this time. Endorsed back to RN in good, stable condition.    Plan    Recommend Occupational Therapy for evaluation and 1 treatment only.    DC Equipment Recommendations: None  Discharge Recommendations: Anticipate that the patient will have no further occupational therapy needs after discharge from the hospital     Subjective    \"Not that much... just a dull ache.\"     Objective       05/10/22 1139   Prior Living Situation   Prior Services None   Housing / Facility 2 Story House   Steps Into Home 1   Steps In Home 1  (flight upstairs)   Bathroom Set up Walk In Shower;Shower Chair;Grab Bars  (handheld shower)   Equipment Owned Tub / Shower Seat;Grab Bar(s) In Tub / Shower;Raised Toilet Seat Without Arms;Hand Held Shower  (polar ice machine)   Lives with - Patient's Self Care Capacity Spouse   Comments Pt lives with supportive spouse,   Prior Level of ADL Function   Self Feeding Independent   Grooming / Hygiene Independent   Bathing Independent   Dressing Independent   Toileting Independent   Prior Level of IADL Function   Medication Management Independent   Laundry Independent   Kitchen Mobility Independent   Finances Independent   Home Management Independent   Shopping Independent   Prior Level Of Mobility Independent Without " Device in Community;Independent Without Device in Home   Driving / Transportation Driving Independent   Occupation (Pre-Hospital Vocational) Retired Due To Age   History of Falls   History of Falls No   Precautions   Precautions Non Weight Bearing Right Upper Extremity;Immobilizer Right Upper Extremity   Comments s/p R rTSA with hardware removal   Pain   Pain Scales 0 to 10 Scale    Intervention Cold Pack;Rest   Pain 0 - 10 Group   Location Shoulder   Location Orientation Right   Pain Rating Scale (NPRS) 6   Description Dull;Aching   Comfort Goal Sleep Comfortably   Therapist Pain Assessment Post Activity Pain Same as Prior to Activity   Cognition    Cognition / Consciousness WDL   Comments A+Ox4, pleasant and cooperative   Passive ROM Upper Body   Passive ROM Upper Body X   Comments L UE WNL, R UE immobilized by sling   Active ROM Upper Body   Active ROM Upper Body  X   Dominant Hand Right;Using Non-Dominant Hand   Comments L UE WNL, R UE immobilized by sling   Strength Upper Body   Upper Body Strength  X   Comments R UE on NWB restriction post op   Coordination Upper Body   Coordination WDL   Balance Assessment   Sitting Balance (Static) Good   Sitting Balance (Dynamic) Good   Standing Balance (Static) Fair +   Standing Balance (Dynamic) Fair +   Weight Shift Sitting Good   Weight Shift Standing Good   Comments no AD, HHA only   Bed Mobility    Comments seen in Phase 2 PACU   ADL Assessment   Upper Body Dressing Moderate Assist   Functional Mobility   Sit to Stand Supervised   Bed, Chair, Wheelchair Transfer Supervised   Transfer Method Stand Step   Mobility with HHA only   Distance (Feet) 150   # of Times Distance was Traveled 1   Activity Tolerance   Sitting in Chair ad jose; up in recliner pre and post OT   Sitting Edge of Bed N/A   Standing 10 mins total   Education Group   Education Provided Role of Occupational Therapist;Upper Extremity Range of Motion;Brace Wear and Care;Home Safety;Activities of Daily  Living;Weight Bearing Precautions   Role of Occupational Therapist Patient Response Patient;Significant Other;Explanation;Acceptance;Verbal Demonstration   Upper Ext ROM Patient Response Patient;Significant Other;Explanation;Handout;Demonstration;Acceptance;Verbal Demonstration   Brace Wear & Care Patient Response Patient;Significant Other;Explanation;Demonstration;Handout;Acceptance;Verbal Demonstration;Action Demonstration   Home Safety Patient Response Patient;Significant Other;Explanation;Verbal Demonstration   ADL Patient Response Patient;Significant Other;Explanation;Demonstration;Handout;Acceptance;Verbal Demonstration   Weight Bearing Precautions Patient Response Patient;Significant Other;Explanation;Demonstration;Handout;Acceptance;Verbal Demonstration;Action Demonstration   Anticipated Discharge Equipment and Recommendations   DC Equipment Recommendations None   Discharge Recommendations Anticipate that the patient will have no further occupational therapy needs after discharge from the hospital   Interdisciplinary Plan of Care Collaboration   IDT Collaboration with  Nursing;Family / Caregiver   Patient Position at End of Therapy Seated;Family / Friend in Room   Collaboration Comments RN aware of OT session   Session Information   Date / Session Number  5/10 #1 (one time only)   Priority 0

## 2022-05-10 NOTE — ANESTHESIA POSTPROCEDURE EVALUATION
Patient: Cristin Elizalde    Procedure Summary     Date: 05/10/22 Room / Location:  OR 04 / SURGERY [facility]    Anesthesia Start: 0830 Anesthesia Stop: 1009    Procedures:       Right reverse total shoulder arthroplasty, hardware removal and repairs as indicated (Right Shoulder)      REMOVAL, HARDWARE (Right Shoulder) Diagnosis:       Osteoarthritis of right shoulder region      (Osteoarthritis of right shoulder region [M19.011])    Surgeons: Boston Bower M.D. Responsible Provider: Boston Valiente M.D.    Anesthesia Type: general, peripheral nerve block ASA Status: 2          Final Anesthesia Type: general, peripheral nerve block  Last vitals  BP        Temp        Pulse       Resp        SpO2          Anesthesia Post Evaluation    Patient location during evaluation: PACU  Patient participation: complete - patient participated  Level of consciousness: awake and alert  Pain score: 1    Airway patency: patent  Anesthetic complications: no  Cardiovascular status: hemodynamically stable  Respiratory status: acceptable  Hydration status: euvolemic    PONV: none          No complications documented.

## 2022-05-10 NOTE — OP REPORT
SURGEON:  Boston Bower M.D.    PREOPERATIVE DIAGNOSIS:  Right shoulder degenerative joint disease and rotator cuff arthropathy.    POSTOPERATIVE DIAGNOSIS:  Right shoulder degenerative joint disease, rotator cuff arthropathy and proximal biceps pathology and retained hardware from previous rotator cuff repairs.    PROCEDURES PERFORMED:      1. Right reverse total shoulder arthroplasty   2. Right proximal biceps tenodesis  3.  Right shoulder hardware removal    ASSISTANT:  Alta Paez PA-C    ANESTHESIA:  General and interscalene nerve block.    ANESTHESIOLOGIST:  Boston Valiente MD    IMPLANTS: Size 1 Tornier Aequalis Ascend Flex press fit humeral component; low-offset reverse humeral adaptor; 36+6 mm articular insert, 36 mm centered glenosphere, 25mm baseplate with a 30 mm central screw and 2 locking screws.    COMPLICATIONS:  None.    DISPOSITION:  Stable to Post Anesthesia Care Unit.    INDICATIONS:  The patient has had progressive right shoulder pain and dysfunction, which has been unresponsive to conservative management.  The risks, benefits, alternatives, and limitations of surgical intervention were discussed in detail.  The patient has expressed understanding and desires to proceed.    PROCEDURE IN DETAIL:  The patient and the corrective operative extremity were identified in the preoperative area.  The shoulder was marked.  The patient was brought to the operating room where the correct operative extremity was again confirmed.  They were placed supine on the OR table after undergoing an interscalene nerve block performed at my request for postoperative pain control.  General anesthesia was then induced without complication.  Examination under anesthesia showed limited range of motion in all planes.  The shoulder was prepped with alcohol.  This was allowed to dry.  The shoulder was then prepped and draped in the usual sterile fashion using ChloraPrep.      A longitudinal incision was centered over the  deltopectoral interval. Sharp dissection was carried down to the deltoid fascia. The deltopectoral interval was then developed, and the cephalic vein preserved. A Kobel retractor was placed beneath the conjoint tendon and the deltoid. The superior 1 cm of the pectoralis was released and the biceps was tenodesed to the pectoralis stump. The biceps was the released through the rotator interval, where there was significant tenosynovitis of the biceps. The subscapularis was then tenotomized and tagged for later repair. The humeral head was dislocated and exposed, releasing the inferior capsule. The osteophytes were removed from the humeral head and neck, and a humeral head osteotomy was performed.  2 metal suture anchors were removed from the metaphysis, and the defect was bone grafted with bone from the humeral head osteotomy.  The canal was sounded and broached up to the appropriate humeral component. A humeral head protector was placed, and the glenoid exposed with a Fukuda retractor. The biceps stump and the labrum were excised. A centering drill was placed in the glenoid, and the glenoid face reamed for the 25 mm standard baseplate. The center hole was drilled and tapped, and a 25 mm standard baseplate was placed with 30 mm central screw, which obtained good compression of the baseplate. Two locking screws were then placed, obtaining bicortical compression. The real glenosphere was then impacted, and the locking screw engaged. We then dislocated the shoulder, placed a trial reverse adaptor and a trial polyethylene liner on the humeral stem, and undertook a trial reduction. There was excellent range of motion and stability. We then removed the trials from the humerus, and placed 5 #2 permanent sutures through drill tunnels in the lesser tuberosity. We then impacted the real reverse adaptor/stem construct at the back table, and impacted the real humeral component with the metaphysis of the humerus, where it fit  flush. We then impacted the real polyethylene component. We then reduced the shoulder and took it through a full range of motion. There was excellent range of motion and stability. We did a Betadine flush of the wound, then copiously irrigated the wound with normal saline. We then closed the subscapularis tenotomy and rotator interval with interrupted #1 Vicryl sutures, oversewing the subscapularis repair with the #2 sutures for a double row repair. The wound was again irrigated with saline, and the deltopectoral interval was closed with #1 Vicryl. The deep subcutaneous layer was closed with #2 Monocryl and the skin was closed with staples. Sterile dressings were applied. The upper extremity was placed into an UltraSling. The patient was then allowed to awake from anesthesia, transferred to their hospital cart, and taken to the Post Anesthesia Care Unit in stable condition. The patient tolerated the procedure well.  There were no immediate complications.

## 2022-05-10 NOTE — ANESTHESIA PROCEDURE NOTES
Airway    Date/Time: 5/10/2022 8:34 AM  Performed by: Boston Valiente M.D.  Authorized by: Boston Valiente M.D.     Location:  OR  Urgency:  Elective  Indications for Airway Management:  Anesthesia      Spontaneous Ventilation: absent    Sedation Level:  Deep  Preoxygenated: Yes    Patient Position:  Sniffing  Final Airway Type:  Endotracheal airway  Final Endotracheal Airway:  ETT  Cuffed: Yes    Technique Used for Successful ETT Placement:  Direct laryngoscopy    Insertion Site:  Oral  Blade Type:  Kush  Laryngoscope Blade/Videolaryngoscope Blade Size:  3  ETT Size (mm):  7.0  Measured from:  Teeth  ETT to Teeth (cm):  21  Placement Verified by: auscultation and capnometry    Cormack-Lehane Classification:  Grade I - full view of glottis  Number of Attempts at Approach:  1

## 2022-05-10 NOTE — ANESTHESIA PROCEDURE NOTES
Peripheral Block    Date/Time: 5/10/2022 8:06 AM  Performed by: Boston Valiente M.D.  Authorized by: Boston Valiente M.D.     Patient Location:  Pre-op  Start Time:  5/10/2022 8:06 AM  End Time:  5/10/2022 8:10 AM  Reason for Block: at surgeon's request and post-op pain management ONLY    patient identified, IV checked, site marked, risks and benefits discussed, surgical consent, monitors and equipment checked, pre-op evaluation and timeout performed    Patient Position:  Supine  Prep: ChloraPrep    Monitoring:  Heart rate, continuous pulse ox and cardiac monitor  Block Region:  Upper Extremity  Upper Extremity - Block Type:  BRACHIAL PLEXUS block, Interscalene approach    Laterality:  Right  Procedures: ultrasound guided  Image captured, interpreted and electronically stored.  Local Infiltration:  Lidocaine  Strength:  1 %  Dose:  3 ml  Block Type:  Single-shot  Needle Length:  50mm  Needle Gauge:  22 G  Needle Localization:  Ultrasound guidance  Injection Assessment:  Negative aspiration for heme, no paresthesia on injection, incremental injection and local visualized surrounding nerve on ultrasound  Evidence of intravascular injection: No     Ultrasound images saved in chart

## 2022-05-10 NOTE — OR NURSING
1030: Care assumed of dozing pt who rouses easily and denies pain/nausea.  1051: No change in surgical site assessment.Meets criteria to transfer to Stage 2.

## 2022-05-10 NOTE — DISCHARGE INSTR - OTHER INFO
Discharge when meets criteria, advance diet as tolerated. May shower POD #2 with silver dressing in place. Ice and elevate extremity.  Patient has follow up appt. Immobilizer on at all times. NWB operative arm. Patient has RX.

## 2022-05-10 NOTE — OR NURSING
1008 To PACU from OR via jose grcele s/p right reverse shoulder arthroplasty. Pt drowsy, respirations spontaneous and non-labored. Surgical dressing to right shoulder. Immobilizer to RUE. Fingers to the affected extremity are pink and warm, brisk cap refill, radial pulse palpable. Pt has no complaints.     1023 No changes.     1030 Pt's  updated. Report to GABRIEL Mcdowell.

## 2022-06-29 ENCOUNTER — TELEPHONE (OUTPATIENT)
Dept: HEALTH INFORMATION MANAGEMENT | Facility: OTHER | Age: 82
End: 2022-06-29

## 2022-07-11 SDOH — ECONOMIC STABILITY: TRANSPORTATION INSECURITY
IN THE PAST 12 MONTHS, HAS LACK OF TRANSPORTATION KEPT YOU FROM MEETINGS, WORK, OR FROM GETTING THINGS NEEDED FOR DAILY LIVING?: NO

## 2022-07-11 SDOH — ECONOMIC STABILITY: HOUSING INSECURITY
IN THE LAST 12 MONTHS, WAS THERE A TIME WHEN YOU DID NOT HAVE A STEADY PLACE TO SLEEP OR SLEPT IN A SHELTER (INCLUDING NOW)?: NO

## 2022-07-11 SDOH — ECONOMIC STABILITY: INCOME INSECURITY: HOW HARD IS IT FOR YOU TO PAY FOR THE VERY BASICS LIKE FOOD, HOUSING, MEDICAL CARE, AND HEATING?: NOT HARD AT ALL

## 2022-07-11 SDOH — HEALTH STABILITY: PHYSICAL HEALTH: ON AVERAGE, HOW MANY DAYS PER WEEK DO YOU ENGAGE IN MODERATE TO STRENUOUS EXERCISE (LIKE A BRISK WALK)?: 4 DAYS

## 2022-07-11 SDOH — ECONOMIC STABILITY: INCOME INSECURITY: IN THE LAST 12 MONTHS, WAS THERE A TIME WHEN YOU WERE NOT ABLE TO PAY THE MORTGAGE OR RENT ON TIME?: NO

## 2022-07-11 SDOH — ECONOMIC STABILITY: FOOD INSECURITY: WITHIN THE PAST 12 MONTHS, YOU WORRIED THAT YOUR FOOD WOULD RUN OUT BEFORE YOU GOT MONEY TO BUY MORE.: NEVER TRUE

## 2022-07-11 SDOH — ECONOMIC STABILITY: TRANSPORTATION INSECURITY
IN THE PAST 12 MONTHS, HAS THE LACK OF TRANSPORTATION KEPT YOU FROM MEDICAL APPOINTMENTS OR FROM GETTING MEDICATIONS?: NO

## 2022-07-11 SDOH — ECONOMIC STABILITY: TRANSPORTATION INSECURITY
IN THE PAST 12 MONTHS, HAS LACK OF RELIABLE TRANSPORTATION KEPT YOU FROM MEDICAL APPOINTMENTS, MEETINGS, WORK OR FROM GETTING THINGS NEEDED FOR DAILY LIVING?: NO

## 2022-07-11 SDOH — HEALTH STABILITY: MENTAL HEALTH
STRESS IS WHEN SOMEONE FEELS TENSE, NERVOUS, ANXIOUS, OR CAN'T SLEEP AT NIGHT BECAUSE THEIR MIND IS TROUBLED. HOW STRESSED ARE YOU?: NOT AT ALL

## 2022-07-11 SDOH — ECONOMIC STABILITY: FOOD INSECURITY: WITHIN THE PAST 12 MONTHS, THE FOOD YOU BOUGHT JUST DIDN'T LAST AND YOU DIDN'T HAVE MONEY TO GET MORE.: NEVER TRUE

## 2022-07-11 SDOH — ECONOMIC STABILITY: HOUSING INSECURITY

## 2022-07-11 SDOH — HEALTH STABILITY: PHYSICAL HEALTH: ON AVERAGE, HOW MANY MINUTES DO YOU ENGAGE IN EXERCISE AT THIS LEVEL?: 10 MIN

## 2022-07-11 ASSESSMENT — LIFESTYLE VARIABLES
HOW MANY STANDARD DRINKS CONTAINING ALCOHOL DO YOU HAVE ON A TYPICAL DAY: 1 OR 2
HOW OFTEN DO YOU HAVE A DRINK CONTAINING ALCOHOL: MONTHLY OR LESS
HOW OFTEN DO YOU HAVE SIX OR MORE DRINKS ON ONE OCCASION: NEVER
AUDIT-C TOTAL SCORE: 1
SKIP TO QUESTIONS 9-10: 1

## 2022-07-11 ASSESSMENT — SOCIAL DETERMINANTS OF HEALTH (SDOH)
IN A TYPICAL WEEK, HOW MANY TIMES DO YOU TALK ON THE PHONE WITH FAMILY, FRIENDS, OR NEIGHBORS?: THREE TIMES A WEEK
WITHIN THE PAST 12 MONTHS, YOU WORRIED THAT YOUR FOOD WOULD RUN OUT BEFORE YOU GOT THE MONEY TO BUY MORE: NEVER TRUE
HOW OFTEN DO YOU HAVE A DRINK CONTAINING ALCOHOL: MONTHLY OR LESS
IN A TYPICAL WEEK, HOW MANY TIMES DO YOU TALK ON THE PHONE WITH FAMILY, FRIENDS, OR NEIGHBORS?: THREE TIMES A WEEK
DO YOU BELONG TO ANY CLUBS OR ORGANIZATIONS SUCH AS CHURCH GROUPS UNIONS, FRATERNAL OR ATHLETIC GROUPS, OR SCHOOL GROUPS?: YES
HOW OFTEN DO YOU GET TOGETHER WITH FRIENDS OR RELATIVES?: ONCE A WEEK
DO YOU BELONG TO ANY CLUBS OR ORGANIZATIONS SUCH AS CHURCH GROUPS UNIONS, FRATERNAL OR ATHLETIC GROUPS, OR SCHOOL GROUPS?: YES
HOW OFTEN DO YOU ATTEND CHURCH OR RELIGIOUS SERVICES?: 1 TO 4 TIMES PER YEAR
HOW HARD IS IT FOR YOU TO PAY FOR THE VERY BASICS LIKE FOOD, HOUSING, MEDICAL CARE, AND HEATING?: NOT HARD AT ALL
HOW OFTEN DO YOU ATTENT MEETINGS OF THE CLUB OR ORGANIZATION YOU BELONG TO?: 1 TO 4 TIMES PER YEAR
HOW MANY DRINKS CONTAINING ALCOHOL DO YOU HAVE ON A TYPICAL DAY WHEN YOU ARE DRINKING: 1 OR 2
HOW OFTEN DO YOU ATTENT MEETINGS OF THE CLUB OR ORGANIZATION YOU BELONG TO?: 1 TO 4 TIMES PER YEAR
HOW OFTEN DO YOU ATTEND CHURCH OR RELIGIOUS SERVICES?: 1 TO 4 TIMES PER YEAR
HOW OFTEN DO YOU GET TOGETHER WITH FRIENDS OR RELATIVES?: ONCE A WEEK
HOW OFTEN DO YOU HAVE SIX OR MORE DRINKS ON ONE OCCASION: NEVER

## 2022-07-14 ENCOUNTER — OFFICE VISIT (OUTPATIENT)
Dept: MEDICAL GROUP | Facility: PHYSICIAN GROUP | Age: 82
End: 2022-07-14
Payer: MEDICARE

## 2022-07-14 VITALS
HEART RATE: 67 BPM | DIASTOLIC BLOOD PRESSURE: 58 MMHG | TEMPERATURE: 98.2 F | HEIGHT: 60 IN | WEIGHT: 110.2 LBS | BODY MASS INDEX: 21.64 KG/M2 | OXYGEN SATURATION: 97 % | SYSTOLIC BLOOD PRESSURE: 100 MMHG | RESPIRATION RATE: 16 BRPM

## 2022-07-14 DIAGNOSIS — Z76.89 ENCOUNTER TO ESTABLISH CARE: ICD-10-CM

## 2022-07-14 DIAGNOSIS — Z12.31 ENCOUNTER FOR SCREENING MAMMOGRAM FOR MALIGNANT NEOPLASM OF BREAST: ICD-10-CM

## 2022-07-14 DIAGNOSIS — F51.01 PRIMARY INSOMNIA: ICD-10-CM

## 2022-07-14 DIAGNOSIS — E03.4 HYPOTHYROIDISM DUE TO ACQUIRED ATROPHY OF THYROID: ICD-10-CM

## 2022-07-14 DIAGNOSIS — R41.3 MEMORY CHANGES: ICD-10-CM

## 2022-07-14 PROCEDURE — 99214 OFFICE O/P EST MOD 30 MIN: CPT | Performed by: STUDENT IN AN ORGANIZED HEALTH CARE EDUCATION/TRAINING PROGRAM

## 2022-07-14 RX ORDER — TRAZODONE HYDROCHLORIDE 50 MG/1
50 TABLET ORAL
Qty: 90 TABLET | Refills: 3 | Status: SHIPPED | OUTPATIENT
Start: 2022-07-14 | End: 2023-01-16 | Stop reason: SDUPTHER

## 2022-07-14 RX ORDER — DONEPEZIL HYDROCHLORIDE 10 MG/1
10 TABLET, FILM COATED ORAL DAILY
Qty: 90 TABLET | Refills: 3 | Status: SHIPPED | OUTPATIENT
Start: 2022-07-14 | End: 2023-01-16 | Stop reason: SDUPTHER

## 2022-07-14 RX ORDER — LEVOTHYROXINE SODIUM 0.05 MG/1
TABLET ORAL
Qty: 90 TABLET | Refills: 3 | Status: SHIPPED | OUTPATIENT
Start: 2022-07-14 | End: 2023-01-16 | Stop reason: SDUPTHER

## 2022-07-14 RX ORDER — HYDROCODONE BITARTRATE AND ACETAMINOPHEN 5; 325 MG/1; MG/1
TABLET ORAL
COMMUNITY
Start: 2022-05-20 | End: 2022-07-14

## 2022-07-14 ASSESSMENT — PATIENT HEALTH QUESTIONNAIRE - PHQ9: CLINICAL INTERPRETATION OF PHQ2 SCORE: 0

## 2022-07-14 ASSESSMENT — FIBROSIS 4 INDEX: FIB4 SCORE: 2.57

## 2022-07-14 NOTE — PROGRESS NOTES
Subjective:     CC:  Diagnoses of Hypothyroidism due to acquired atrophy of thyroid, Encounter to establish care, Encounter for screening mammogram for malignant neoplasm of breast, Memory changes, and Primary insomnia were pertinent to this visit.    HISTORY OF THE PRESENT ILLNESS: Patient is a 82 y.o. female. This pleasant patient is here today to establish care.  She voices no acute concerns.  His/her prior PCP was Dr. Anabelle Pryor MD.    1.  Hypothyroidism  Longstanding.  Patient is fully compliant with her levothyroxine 50 mcg daily.  She reports no symptoms of hyper/hypothyroidism.    2.  Primary insomnia  Longstanding.  Patient treats with trazodone 50 mg nightly and finds that this provides her adequate relief.  She reports no side effects of treatment.    3.  Memory changes  Patient reports occasional lapses in memory.  She still drives and is able to navigate about time without difficulty.  She still manages her own finances.  She has been taking donepezil 10 mg daily.    Active Diagnosis:    Patient Active Problem List   Diagnosis   • Osteopenia of multiple sites   • Hypothyroidism due to acquired atrophy of thyroid   • Oral bisphosphonate-related jaw necrosis (HCC)   • Vitamin D deficiency   • Seronegative erosive rheumatoid arthritis (HCC)   • Other neutropenia (HCC)   • Primary insomnia   • Memory changes   • Other fatigue   • Daytime somnolence   • Osteoarthritis of right shoulder region      Current Outpatient Medications Ordered in Epic   Medication Sig Dispense Refill   • donepezil (ARICEPT) 10 MG tablet Take 1 Tablet by mouth every day. 90 Tablet 3   • levothyroxine (SYNTHROID) 50 MCG Tab Take one tablet each morning, on an empty stomach, 30 minutes before breakfast. 90 Tablet 3   • traZODone (DESYREL) 50 MG Tab Take 1 Tablet by mouth at bedtime. 90 Tablet 3   • Albuterol Sulfate 108 (90 Base) MCG/ACT AEROSOL POWDER, BREATH ACTIVATED Inhale 2 Puffs every 6 hours as needed.     • vitamin D  (CHOLECALCIFEROL) 1000 UNIT Tab Take 1,000 Units by mouth every day.     • MULTIVITAMINS PO Take 1 Tablet by mouth every day.       No current [facility]-ordered facility-administered medications on file.     ROS:   Gen: No fevers/chills, no changes in weight  HEENT: No changes in vision/hearing, sore throat.  Pulm: No cough, unexplained SOB.  CV: No chest pain/pressure, no palpitations  GI: No nausea/vomiting, no diarrhea  : No dysuria/nocturia  MSk: No myalgias  Skin: No rash/skin changes  Neuro: No headaches, no numbness/tingling  Heme/Lymph: no easy bruising      Objective:     Exam: /58 (BP Location: Left arm, Patient Position: Sitting, BP Cuff Size: Adult)   Pulse 67   Temp 36.8 °C (98.2 °F) (Temporal)   Resp 16   Ht 1.524 m (5')   Wt 50 kg (110 lb 3.2 oz)   SpO2 97%  Body mass index is 21.52 kg/m².    General: Normal appearing. No distress.  HEENT: Normocephalic. Eyes conjunctiva clear lids without ptosis, pupils equal and reactive to light accommodation. Ears normal shape and contour, canals are clear bilaterally, tympanic membranes are benign, nasal mucosa benign, oropharynx is without erythema, edema or exudates.   Neck: Supple without JVD. Thyroid is not enlarged.  Pulmonary: Clear to ausculation.  Normal effort. No rales, ronchi, or wheezing.  Cardiovascular: Regular rate and rhythm without murmur. Radial pulses are intact and equal bilaterally.  Abdomen: Nondistended.   Neurologic: Grossly nonfocal.  CN II through XII intact.  Lymph: No cervical or supraclavicular lymph nodes are palpable  Skin: Warm and dry.  No obvious lesions.  Musculoskeletal: Normal gait. No extremity cyanosis, clubbing, or edema.  Psych: Normal mood and affect. Alert and oriented x3. Judgment and insight are normal.    Mini-Mental status exam with full points.    A chaperone was offered to the patient during today's exam. Patient declined chaperone.    Labs:   4/25/2022:  -CBC, RBCs 3.92.  -BMP, WNL    -6/16/2021:  -TSH  2.04.    Assessment & Plan:   82 y.o. female with the following -    1.  Hypothyroidism  -Chronic, stable.    -Continue levothyroxine 50 mcg daily on an empty stomach.  -TSH with reflex T4    2.  Primary insomnia  -Chronic, stable.  -Continue trazodone 50 mg nightly.  Dispense 90.  Refill 3.    3.  Memory changes  -Chronic.  -Patient still maintains a very high level of function.  -Continue Donepezil 10 mg daily.    4. Encounter to Establish Care.  -  We discussed diet/exercise/healthy weight maintenance.  We discussed the need to always wear seatbelt.  - mammogram    Return in about 6 months (around 1/14/2023).    Please note that this dictation was created using voice recognition software. I have made every reasonable attempt to correct obvious errors, but I expect that there are errors of grammar and possibly content that I did not discover before finalizing the note.      Julio Donaldson PA-C 7/14/2022

## 2022-07-26 ENCOUNTER — OFFICE VISIT (OUTPATIENT)
Dept: MEDICAL GROUP | Facility: PHYSICIAN GROUP | Age: 82
End: 2022-07-26
Payer: MEDICARE

## 2022-07-26 VITALS
HEART RATE: 68 BPM | SYSTOLIC BLOOD PRESSURE: 90 MMHG | TEMPERATURE: 97.3 F | DIASTOLIC BLOOD PRESSURE: 56 MMHG | BODY MASS INDEX: 21.6 KG/M2 | OXYGEN SATURATION: 98 % | WEIGHT: 110 LBS | HEIGHT: 60 IN

## 2022-07-26 DIAGNOSIS — R09.81 SINUS CONGESTION: ICD-10-CM

## 2022-07-26 DIAGNOSIS — I95.0 IDIOPATHIC HYPOTENSION: ICD-10-CM

## 2022-07-26 PROBLEM — C73 PAPILLARY CARCINOMA, FOLLICULAR VARIANT (HCC): Status: ACTIVE | Noted: 2022-07-26

## 2022-07-26 PROBLEM — J45.909 ASTHMA: Status: ACTIVE | Noted: 2022-07-26

## 2022-07-26 PROCEDURE — 99213 OFFICE O/P EST LOW 20 MIN: CPT | Performed by: FAMILY MEDICINE

## 2022-07-26 RX ORDER — FLUTICASONE PROPIONATE 50 MCG
1 SPRAY, SUSPENSION (ML) NASAL DAILY
Qty: 16 G | Refills: 1 | Status: SHIPPED | OUTPATIENT
Start: 2022-07-26

## 2022-07-26 RX ORDER — AZITHROMYCIN 250 MG/1
TABLET, FILM COATED ORAL
Qty: 6 TABLET | Refills: 0 | Status: SHIPPED | OUTPATIENT
Start: 2022-07-26 | End: 2023-01-16

## 2022-07-26 ASSESSMENT — ENCOUNTER SYMPTOMS
SINUS PAIN: 1
DIZZINESS: 0
VOMITING: 0
CHILLS: 0
HEADACHES: 0
MYALGIAS: 0
COUGH: 0
NAUSEA: 0
SORE THROAT: 0
FEVER: 0
BLURRED VISION: 0
SHORTNESS OF BREATH: 0
DOUBLE VISION: 0
PALPITATIONS: 0

## 2022-07-26 ASSESSMENT — FIBROSIS 4 INDEX: FIB4 SCORE: 2.57

## 2022-08-03 DIAGNOSIS — R41.3 MEMORY CHANGES: ICD-10-CM

## 2022-08-03 NOTE — TELEPHONE ENCOUNTER
Received request via: Patient    Was the patient seen in the last year in this department? Yes    Does the patient have an active prescription (recently filled or refills available) for medication(s) requested? yes but left medications at home. On vacaction in [place]

## 2022-08-04 RX ORDER — DONEPEZIL HYDROCHLORIDE 10 MG/1
10 TABLET, FILM COATED ORAL DAILY
Qty: 90 TABLET | Refills: 0 | OUTPATIENT
Start: 2022-08-04

## 2022-08-04 RX ORDER — LEVOTHYROXINE SODIUM 0.05 MG/1
TABLET ORAL
Qty: 90 TABLET | Refills: 0 | OUTPATIENT
Start: 2022-08-04

## 2022-08-04 NOTE — TELEPHONE ENCOUNTER
left medications at home. On vacaction in [place]. Would like sent to new pharmacy as stated below.

## 2022-08-24 ENCOUNTER — HOSPITAL ENCOUNTER (OUTPATIENT)
Dept: RADIOLOGY | Facility: MEDICAL CENTER | Age: 82
End: 2022-08-24
Attending: STUDENT IN AN ORGANIZED HEALTH CARE EDUCATION/TRAINING PROGRAM
Payer: MEDICARE

## 2022-08-24 DIAGNOSIS — Z12.31 ENCOUNTER FOR SCREENING MAMMOGRAM FOR MALIGNANT NEOPLASM OF BREAST: ICD-10-CM

## 2022-08-24 PROCEDURE — 77063 BREAST TOMOSYNTHESIS BI: CPT

## 2022-11-11 ENCOUNTER — PATIENT MESSAGE (OUTPATIENT)
Dept: HEALTH INFORMATION MANAGEMENT | Facility: OTHER | Age: 82
End: 2022-11-11

## 2022-11-23 ENCOUNTER — OFFICE VISIT (OUTPATIENT)
Dept: MEDICAL GROUP | Facility: PHYSICIAN GROUP | Age: 82
End: 2022-11-23
Payer: MEDICARE

## 2022-11-23 ENCOUNTER — TELEPHONE (OUTPATIENT)
Dept: MEDICAL GROUP | Facility: PHYSICIAN GROUP | Age: 82
End: 2022-11-23

## 2022-11-23 VITALS — RESPIRATION RATE: 16 BRPM

## 2022-11-23 DIAGNOSIS — U07.1 COVID: ICD-10-CM

## 2022-11-23 PROCEDURE — 99213 OFFICE O/P EST LOW 20 MIN: CPT | Mod: 95

## 2022-11-23 ASSESSMENT — ENCOUNTER SYMPTOMS
CHILLS: 1
FEVER: 1
DIARRHEA: 1
SHORTNESS OF BREATH: 0
COUGH: 1

## 2022-11-24 NOTE — PROGRESS NOTES
Subjective:     CC: COVID-19 positive    HPI:   Cristin is a patient of Julio Soto who presents today with    Problem   Covid    Patient tested positive for COVID-19 last night.  Reports onset of symptoms yesterday with headache, fever, chills and diarrhea.  She is 82 years and has history of cerebellar stroke for rheumatoid arthritis, memory changes, and asthma.  She shortness of breath or chest pain.  Visit conducted while patient was sitting in her car wearing mask.  I stood 8 feet away with N95 and PPE per protocol. -This was supposed to be a virtual visit, however patient had difficulty accessing her MyChart  -Patient in no acute distress she is with  today.           Health Maintenance: Recommend follow up with PCP for HM topics    ROS:  Review of Systems   Constitutional:  Positive for chills, fever and malaise/fatigue.   Respiratory:  Positive for cough. Negative for shortness of breath.    Cardiovascular:  Negative for chest pain.   Gastrointestinal:  Positive for diarrhea.     Objective:     Exam:  Resp 16  There is no height or weight on file to calculate BMI.    Physical Exam  Constitutional:       General: She is not in acute distress.     Appearance: Normal appearance. She is not ill-appearing or toxic-appearing.   HENT:      Head: Normocephalic.   Eyes:      Conjunctiva/sclera: Conjunctivae normal.   Pulmonary:      Effort: Pulmonary effort is normal.   Skin:     General: Skin is warm and dry.   Neurological:      General: No focal deficit present.      Mental Status: She is alert and oriented to person, place, and time.   Psychiatric:         Mood and Affect: Mood normal.         Behavior: Behavior normal.       Labs: Positive Covid home test    Assessment & Plan: Medical Decision Making     82 y.o. female with the following -     Problem List Items Addressed This Visit       COVID     Positive COVID-19 test  - Patient requesting Paxilovid.  This was sent to her pharmacy.  Risk and  "benefit of this medication discussed including ADRs.  Medication reactions also discussed.  Its been recommended that she talk taking her Aricept as well as her trazodone and vitamin supplements while on this medication.  -ED precautions given and known for worsening or progression of this condition including any shortness of breath or chest pain  -Self supportive measures to include Tylenol or Ibuprofen if needed for fever and myalgias, Isolation at home for at least 5 days.  Hydrate adequately and get rest.   COVID Discharge Instructions    You were diagnosed with COVID pneumonia.       Diet- regular. Stay hydrated.   Activity -slowly return to normal activity.       ISOLATE at least 5 days      Until you receive your COVID test results and have shown improvement in your symptoms, for your health and safety, we kindly advise you to postpone all elective medical appointments, including but not limited to physical therapy, dental, chiropractic, routine check-ups, etc    Please note- If you had any exposure to someone who tested positive for COVID and then you developed symptoms, even with a negative test, you must \"presume\" you are positive and isolate for 10 days after the onset of your symptoms.    Please do not allow any visitors. Isolate yourself at your home. No social gatherings, no public places (I.e [employer],  centers, shopping, or other public areas).  Do not shake hands. Please avoid close contact like hugging or kissing.  If living with others, try and consolidate to your own bathroom and bedroom if possible, try and wipe down hard surfaces twice a day.      ADVICE FOR YOU  Please call the COVID-19 hotline if you develop increased SOB or worsening symptoms or proceed to the Emergency Department.  Please let the Emergency Department, 1 providers or any other healthcare providers know that you have a COVID test pending before you seek care.    Please wear a mask in public after you have been taken " off isolation, and please follow [facility] and [facility] guidelines for public masking.  Use of a mask if you are around asymptomatic household members.   If you have lost your sense of taste or smell, it may take up to 6 weeks for that to improve completely.  You do not have to wait until your sense of taste and smell have completely returned prior to stopping isolation.  (see Home Isolation Instructions above for details).  If you are still having problems at 6 weeks, please see your Primary Care Provider.    If you become sicker (even if your initial COVID test is negative), have difficulty breathing, or chest pain, or you are unable to eat or drink enough or have severe vomiting, diarrhea, or weakness, you may need to go to the Emergency Department or contact your clinic provider for re-evaluation.  If you need care and are coming into the hospital or one of our clinics, inform the healthcare facility by phone that you have been tested for COVID-19 prior to entry.  Put on a facemask before you enter the facility or before emergency services arrive if you have called 911.    Unless you have severe difficulty breathing you will be expected to wear your mask throughout your hospital/clinic visit in order to protect our staff and other employees.   Postpone all elective medical appointments within your isolation period, including but not limited to physical therapy, dental, chiropractic, routine check-ups, etc., until cleared by the [facility] department, or your personal physician. If you have any upcoming elective appointments, please contact that office directly in order to cancel, reschedule or be evaluated for a potential virtual visit.    IF YOU LIVE WITH OTHERS  Please have ALL household members quarantine until your test results are back.  They should not go into the public, to  or school, or go to work.  If any of your household members need a work note,  please have them schedule a visit by  calling the [place] hotline at [phone]. If you have further questions for your doctor, you may also schedule a visit with your Primary Care Physician.  If living with others, try to have your own bathroom and bedroom if possible.  Please try and wipe down high-touch surfaces (counters, tabletops, doorknobs, bathroom fixtures, toilets, phones, keyboards, tablets, and bedside tables) at least twice a day. Avoid sharing personal household items. You should not share dishes, drinking glasses, cups, eating utensils, towels, or bedding with other people in your home. After using these items, they should be washed thoroughly with soap and water.  Also, clean any surfaces that may have blood, stool, or body fluids on them. Use a household cleaning spray or wipe, according to the label instructions. Labels contain instructions for safe and effective use of the cleaning product including precautions you should take when applying the product, such as wearing gloves and making sure you have good ventilation during the use of the product.  Clean your hands often. Wash your hands often with soap and water for at least 20 seconds. If soap and water are not available, clean your hands with an alcohol-based hand  that contains at least 70% alcohol, covering all surfaces of your hands and rubbing them together until they feel dry. Soap and water should be used if hands are visibly dirty. Avoid touching your eyes, nose, and mouth with unwashed hands.  Cover your coughs and sneezes.    Please see the resources below for more information:    CDC Corona Website https://www.cdc.gov/coronavirus/2019-ncov/index.html  General Information https://www.cdc.gov/coronavirus/2019-ncov/faq.html              -Nirmatrelvir and Ritonavir 300/100 20 x 150 mg & 10 x 100 mg tablets. Take 2 Nirmatreliver 300 mg twice daily and Ritonavir 150 mg twice daily for 5 days    -Differential diagnosis, natural history, supportive care, and indications  for immediate follow-up discussed.  Shared decision making approach utilized, and patient is amendable with plan of care.  Patient understands to return to clinic or go to the emergency department if symptoms worsen. All questions and concerns addressed.    Return if symptoms worsen or fail to improve.    Please note that this dictation was created using voice recognition software. I have made every reasonable attempt to correct obvious errors, but I expect that there are errors of grammar and possibly content that I did not discover before finalizing the note.

## 2022-11-24 NOTE — ASSESSMENT & PLAN NOTE
"Positive COVID-19 test  - Patient requesting Paxilovid.  This was sent to her pharmacy.  Risk and benefit of this medication discussed including ADRs.  Medication reactions also discussed.  Its been recommended that she talk taking her Aricept as well as her trazodone and vitamin supplements while on this medication.  -ED precautions given and known for worsening or progression of this condition including any shortness of breath or chest pain  -Self supportive measures to include Tylenol or Ibuprofen if needed for fever and myalgias, Isolation at home for at least 5 days.  Hydrate adequately and get rest.   COVID Discharge Instructions    You were diagnosed with COVID pneumonia.       Diet- regular. Stay hydrated.   Activity -slowly return to normal activity.       ISOLATE at least 5 days      Until you receive your COVID test results and have shown improvement in your symptoms, for your health and safety, we kindly advise you to postpone all elective medical appointments, including but not limited to physical therapy, dental, chiropractic, routine check-ups, etc    Please note- If you had any exposure to someone who tested positive for COVID and then you developed symptoms, even with a negative test, you must \"presume\" you are positive and isolate for 10 days after the onset of your symptoms.    Please do not allow any visitors. Isolate yourself at your home. No social gatherings, no public places (I.e [employer],  centers, shopping, or other public areas).  Do not shake hands. Please avoid close contact like hugging or kissing.  If living with others, try and consolidate to your own bathroom and bedroom if possible, try and wipe down hard surfaces twice a day.      ADVICE FOR YOU  Please call the COVID-19 hotline if you develop increased SOB or worsening symptoms or proceed to the Emergency Department.  Please let the Emergency Department, 1 providers or any other healthcare providers know that you have a " COVID test pending before you seek care.    Please wear a mask in public after you have been taken off isolation, and please follow [facility] and [facility] guidelines for public masking.  Use of a mask if you are around asymptomatic household members.   If you have lost your sense of taste or smell, it may take up to 6 weeks for that to improve completely.  You do not have to wait until your sense of taste and smell have completely returned prior to stopping isolation.  (see Home Isolation Instructions above for details).  If you are still having problems at 6 weeks, please see your Primary Care Provider.    • If you become sicker (even if your initial COVID test is negative), have difficulty breathing, or chest pain, or you are unable to eat or drink enough or have severe vomiting, diarrhea, or weakness, you may need to go to the Emergency Department or contact your clinic provider for re-evaluation.  o If you need care and are coming into the hospital or one of our clinics, inform the healthcare facility by phone that you have been tested for COVID-19 prior to entry.  o Put on a facemask before you enter the facility or before emergency services arrive if you have called 911.    o Unless you have severe difficulty breathing you will be expected to wear your mask throughout your hospital/clinic visit in order to protect our staff and other employees.  • Postpone all elective medical appointments within your isolation period, including but not limited to physical therapy, dental, chiropractic, routine check-ups, etc., until cleared by the [facility] department, or your personal physician. If you have any upcoming elective appointments, please contact that office directly in order to cancel, reschedule or be evaluated for a potential virtual visit.    IF YOU LIVE WITH OTHERS  • Please have ALL household members quarantine until your test results are back.  They should not go into the public, to  or  school, or go to work.  If any of your household members need a work note,  please have them schedule a visit by calling the [facility] hotline at [phone]. If you have further questions for your doctor, you may also schedule a visit with your Primary Care Physician.  • If living with others, try to have your own bathroom and bedroom if possible.  Please try and wipe down high-touch surfaces (counters, tabletops, doorknobs, bathroom fixtures, toilets, phones, keyboards, tablets, and bedside tables) at least twice a day. Avoid sharing personal household items. You should not share dishes, drinking glasses, cups, eating utensils, towels, or bedding with other people in your home. After using these items, they should be washed thoroughly with soap and water.  Also, clean any surfaces that may have blood, stool, or body fluids on them. Use a household cleaning spray or wipe, according to the label instructions. Labels contain instructions for safe and effective use of the cleaning product including precautions you should take when applying the product, such as wearing gloves and making sure you have good ventilation during the use of the product.  • Clean your hands often. Wash your hands often with soap and water for at least 20 seconds. If soap and water are not available, clean your hands with an alcohol-based hand  that contains at least 70% alcohol, covering all surfaces of your hands and rubbing them together until they feel dry. Soap and water should be used if hands are visibly dirty. Avoid touching your eyes, nose, and mouth with unwashed hands.  • Cover your coughs and sneezes.    Please see the resources below for more information:    • CDC Corona Website https://www.cdc.gov/coronavirus/2019-ncov/index.html  • General Information https://www.cdc.gov/coronavirus/2019-ncov/faq.html

## 2022-11-24 NOTE — TELEPHONE ENCOUNTER
Paged about a medication issue.    Saw [facility], told she would get medication for COVID. Sent a prescription in and the pharmacist called stating that whatever she prescribed doesn't exist.    Checking the chart, I think the wrong Paxlovid dosing was sent. I have resent the prescription.

## 2022-12-08 ENCOUNTER — APPOINTMENT (RX ONLY)
Dept: URBAN - METROPOLITAN AREA CLINIC 4 | Facility: CLINIC | Age: 82
Setting detail: DERMATOLOGY
End: 2022-12-08

## 2022-12-08 DIAGNOSIS — D18.0 HEMANGIOMA: ICD-10-CM

## 2022-12-08 DIAGNOSIS — L57.8 OTHER SKIN CHANGES DUE TO CHRONIC EXPOSURE TO NONIONIZING RADIATION: ICD-10-CM

## 2022-12-08 DIAGNOSIS — L82.1 OTHER SEBORRHEIC KERATOSIS: ICD-10-CM

## 2022-12-08 DIAGNOSIS — L82.0 INFLAMED SEBORRHEIC KERATOSIS: ICD-10-CM

## 2022-12-08 DIAGNOSIS — L81.4 OTHER MELANIN HYPERPIGMENTATION: ICD-10-CM

## 2022-12-08 DIAGNOSIS — D22 MELANOCYTIC NEVI: ICD-10-CM

## 2022-12-08 PROBLEM — D18.01 HEMANGIOMA OF SKIN AND SUBCUTANEOUS TISSUE: Status: ACTIVE | Noted: 2022-12-08

## 2022-12-08 PROBLEM — D22.5 MELANOCYTIC NEVI OF TRUNK: Status: ACTIVE | Noted: 2022-12-08

## 2022-12-08 PROCEDURE — ? COUNSELING

## 2022-12-08 PROCEDURE — 17110 DESTRUCTION B9 LES UP TO 14: CPT

## 2022-12-08 PROCEDURE — 99213 OFFICE O/P EST LOW 20 MIN: CPT | Mod: 25

## 2022-12-08 PROCEDURE — ? LIQUID NITROGEN

## 2022-12-08 PROCEDURE — ? OBSERVATION

## 2022-12-08 ASSESSMENT — LOCATION DETAILED DESCRIPTION DERM
LOCATION DETAILED: LEFT DISTAL DORSAL FOREARM
LOCATION DETAILED: LEFT ANTERIOR DISTAL THIGH
LOCATION DETAILED: RIGHT ANTERIOR DISTAL THIGH
LOCATION DETAILED: RIGHT INFERIOR UPPER BACK
LOCATION DETAILED: RIGHT ANTERIOR PROXIMAL UPPER ARM
LOCATION DETAILED: LEFT SUPERIOR LATERAL MIDBACK
LOCATION DETAILED: LEFT INFERIOR MEDIAL MIDBACK
LOCATION DETAILED: LEFT ANTERIOR PROXIMAL UPPER ARM
LOCATION DETAILED: RIGHT DISTAL DORSAL FOREARM
LOCATION DETAILED: RIGHT SUPERIOR MEDIAL UPPER BACK
LOCATION DETAILED: RIGHT MID-UPPER BACK
LOCATION DETAILED: LEFT MID-UPPER BACK
LOCATION DETAILED: RIGHT CENTRAL MALAR CHEEK
LOCATION DETAILED: LEFT INFERIOR CENTRAL MALAR CHEEK
LOCATION DETAILED: LEFT MEDIAL SUPERIOR CHEST

## 2022-12-08 ASSESSMENT — LOCATION SIMPLE DESCRIPTION DERM
LOCATION SIMPLE: RIGHT UPPER BACK
LOCATION SIMPLE: RIGHT FOREARM
LOCATION SIMPLE: LEFT UPPER BACK
LOCATION SIMPLE: RIGHT THIGH
LOCATION SIMPLE: LEFT LOWER BACK
LOCATION SIMPLE: LEFT FOREARM
LOCATION SIMPLE: RIGHT UPPER ARM
LOCATION SIMPLE: CHEST
LOCATION SIMPLE: LEFT THIGH
LOCATION SIMPLE: RIGHT CHEEK
LOCATION SIMPLE: LEFT UPPER ARM
LOCATION SIMPLE: LEFT CHEEK

## 2022-12-08 ASSESSMENT — LOCATION ZONE DERM
LOCATION ZONE: ARM
LOCATION ZONE: FACE
LOCATION ZONE: LEG
LOCATION ZONE: TRUNK

## 2022-12-08 NOTE — PROCEDURE: LIQUID NITROGEN
Medical Necessity Information: It is in your best interest to select a reason for this procedure from the list below. All of these items fulfill various CMS LCD requirements except the new and changing color options.
Show Topical Anesthesia Variable?: Yes
Aperture Size (Optional): C
Detail Level: Detailed
Post-Care Instructions: I reviewed with the patient in detail post-care instructions. Patient is to wear sunprotection, and avoid picking at any of the treated lesions. Pt may apply Vaseline to crusted or scabbing areas.
Render Post-Care Instructions In Note?: no
Duration Of Freeze Thaw-Cycle (Seconds): 3
Number Of Freeze-Thaw Cycles: 1 freeze-thaw cycle
Spray Paint Text: The liquid nitrogen was applied to the skin utilizing a spray paint frosting technique.
Medical Necessity Clause: This procedure was medically necessary because the lesions that were treated were:
Consent: The patient's consent was obtained including but not limited to risks of crusting, scabbing, blistering, scarring, darker or lighter pigmentary change, recurrence, incomplete removal and infection.

## 2023-01-16 ENCOUNTER — OFFICE VISIT (OUTPATIENT)
Dept: MEDICAL GROUP | Facility: PHYSICIAN GROUP | Age: 83
End: 2023-01-16
Payer: MEDICARE

## 2023-01-16 VITALS
HEART RATE: 59 BPM | HEIGHT: 60 IN | DIASTOLIC BLOOD PRESSURE: 60 MMHG | BODY MASS INDEX: 23.84 KG/M2 | WEIGHT: 121.4 LBS | TEMPERATURE: 98.4 F | RESPIRATION RATE: 16 BRPM | SYSTOLIC BLOOD PRESSURE: 116 MMHG | OXYGEN SATURATION: 95 %

## 2023-01-16 DIAGNOSIS — R41.3 MEMORY CHANGES: ICD-10-CM

## 2023-01-16 DIAGNOSIS — F51.01 PRIMARY INSOMNIA: ICD-10-CM

## 2023-01-16 DIAGNOSIS — D70.8 OTHER NEUTROPENIA (HCC): ICD-10-CM

## 2023-01-16 DIAGNOSIS — Z00.00 HEALTHCARE MAINTENANCE: ICD-10-CM

## 2023-01-16 DIAGNOSIS — E55.9 VITAMIN D DEFICIENCY: ICD-10-CM

## 2023-01-16 DIAGNOSIS — E03.4 HYPOTHYROIDISM DUE TO ACQUIRED ATROPHY OF THYROID: ICD-10-CM

## 2023-01-16 PROCEDURE — 99214 OFFICE O/P EST MOD 30 MIN: CPT | Performed by: STUDENT IN AN ORGANIZED HEALTH CARE EDUCATION/TRAINING PROGRAM

## 2023-01-16 RX ORDER — LEVOTHYROXINE SODIUM 0.05 MG/1
TABLET ORAL
Qty: 30 TABLET | Refills: 2 | Status: SHIPPED | OUTPATIENT
Start: 2023-01-16 | End: 2023-02-10

## 2023-01-16 RX ORDER — TRAZODONE HYDROCHLORIDE 50 MG/1
50 TABLET ORAL
Qty: 90 TABLET | Refills: 1 | Status: SHIPPED | OUTPATIENT
Start: 2023-01-16 | End: 2023-06-09

## 2023-01-16 RX ORDER — DONEPEZIL HYDROCHLORIDE 10 MG/1
10 TABLET, FILM COATED ORAL DAILY
Qty: 90 TABLET | Refills: 3 | Status: SHIPPED | OUTPATIENT
Start: 2023-01-16 | End: 2023-06-09 | Stop reason: SDUPTHER

## 2023-01-16 ASSESSMENT — FIBROSIS 4 INDEX: FIB4 SCORE: 2.57

## 2023-01-16 ASSESSMENT — PATIENT HEALTH QUESTIONNAIRE - PHQ9: CLINICAL INTERPRETATION OF PHQ2 SCORE: 0

## 2023-01-17 ENCOUNTER — HOSPITAL ENCOUNTER (OUTPATIENT)
Dept: RADIOLOGY | Facility: MEDICAL CENTER | Age: 83
End: 2023-01-17
Attending: ORTHOPAEDIC SURGERY
Payer: MEDICARE

## 2023-01-17 PROCEDURE — 73200 CT UPPER EXTREMITY W/O DYE: CPT | Mod: RT

## 2023-01-17 NOTE — PROGRESS NOTES
CC:  Diagnoses of Hypothyroidism due to acquired atrophy of thyroid, Vitamin D deficiency, Other neutropenia (HCC), Healthcare maintenance, Memory changes, and Primary insomnia were pertinent to this visit.    HISTORY OF THE PRESENT ILLNESS: Patient is a 82 y.o. female. This pleasant patient is here today to discuss:    1. Hypothyroidism due to acquired atrophy of thyroid  Levothyroxine 50 mcg daily.  Patient is fully compliant.  She reports no symptoms of hyper/hypothyroidism.    2. Vitamin D deficiency  Supplements with 1000 units daily.    3. Other neutropenia (HCC)  History of neutropenia or borderline WBC count.    4. Healthcare maintenance    5. Memory changes  Donepezil 10 mg daily.  She continues to have a great deal of forgetfulness and her  feels that this is progressing.  They would like to consult with neurology.    6. Primary insomnia  Trazodone 50 mg nightly.  Patient feels that this adequately controls symptoms    Active Diagnosis:    Patient Active Problem List   Diagnosis    Osteopenia of multiple sites    Hypothyroidism due to acquired atrophy of thyroid    Oral bisphosphonate-related jaw necrosis (HCC)    Vitamin D deficiency    Seronegative erosive rheumatoid arthritis (HCC)    Other neutropenia (HCC)    Primary insomnia    Memory changes    Other fatigue    Daytime somnolence    Osteoarthritis of right shoulder region    Papillary carcinoma, follicular variant (HCC)    Asthma    Sinus congestion    Idiopathic hypotension    COVID      Current Outpatient Medications Ordered in Epic   Medication Sig Dispense Refill    donepezil (ARICEPT) 10 MG tablet Take 1 Tablet by mouth every day. 90 Tablet 3    levothyroxine (SYNTHROID) 50 MCG Tab Take one tablet each morning, on an empty stomach, 30 minutes before breakfast. 30 Tablet 2    traZODone (DESYREL) 50 MG Tab Take 1 Tablet by mouth at bedtime. 90 Tablet 1    fluticasone (FLONASE) 50 MCG/ACT nasal spray Administer 1 Spray into affected  nostril(S) every day. 16 g 1    Albuterol Sulfate 108 (90 Base) MCG/ACT AEROSOL POWDER, BREATH ACTIVATED Inhale 2 Puffs every 6 hours as needed.      vitamin D (CHOLECALCIFEROL) 1000 UNIT Tab Take 1,000 Units by mouth every day.      MULTIVITAMINS PO Take 1 Tablet by mouth every day.       No current [facility]-ordered facility-administered medications on file.     ROS:   See HPI.    Objective:     Exam: /60 (BP Location: Left arm, Patient Position: Sitting, BP Cuff Size: Adult)   Pulse (!) 59   Temp 36.9 °C (98.4 °F) (Temporal)   Resp 16   Ht 1.524 m (5')   Wt 55.1 kg (121 lb 6.4 oz)   SpO2 95%  Body mass index is 23.71 kg/m².    General: Normal appearing. No distress.  Pulmonary: Clear to ausculation.  Normal effort. No rales, ronchi, or wheezing.  Cardiovascular: Regular rate and rhythm without murmur.   neurologic: Grossly nonfocal.    Skin: Warm and dry.  No obvious lesions.  Musculoskeletal: No extremity cyanosis, clubbing, or edema.  Psych: Normal mood and affect.  Patient gets full points on mini cog test.    A chaperone was offered to the patient during today's exam. Patient declined chaperone.      Assessment & Plan:   82 y.o. female with the following -    Labs:   6/16/2021:  -TSH 1.57    4/25/2022:  -CBC showing WBCs 4.9, RBC 3.92, H&H 12.2/37.1.    6/16/2021:  -CBC with WBCs 4.6K    1. Hypothyroidism due to acquired atrophy of thyroid  -Chronic, stable.  -Continue levothyroxine 50 mcg daily.  Dispense 30.  Refill 2.  - TSH WITH REFLEX TO FT4; Future    2. Vitamin D deficiency  -Chronic, stable.  -Continue supplementing 1000 units of vitamin D daily.  - VITAMIN D,25 HYDROXY (DEFICIENCY); Future    3. Other neutropenia (HCC)  -Chronic.  - CBC WITHOUT DIFFERENTIAL; Future    4. Healthcare maintenance  - Comp Metabolic Panel; Future    5. Memory changes  -Chronic, progressive.  - Referral to Neurology  - donepezil (ARICEPT) 10 MG tablet; Take 1 Tablet by mouth every day.  Dispense: 90 Tablet; Refill:  3    6. Primary insomnia  -Chronic, stable.  - traZODone (DESYREL) 50 MG Tab; Take 1 Tablet by mouth at bedtime.  Dispense: 90 Tablet; Refill: 1    Return in about 6 months (around 7/16/2023).  For follow-up on multiple chronic conditions.    Please note that this dictation was created using voice recognition software. I have made every reasonable attempt to correct obvious errors, but I expect that there are errors of grammar and possibly content that I did not discover before finalizing the note.      Julio Donaldson PA-C 1/16/2023

## 2023-02-17 ENCOUNTER — HOSPITAL ENCOUNTER (OUTPATIENT)
Dept: LAB | Facility: MEDICAL CENTER | Age: 83
End: 2023-02-17
Attending: STUDENT IN AN ORGANIZED HEALTH CARE EDUCATION/TRAINING PROGRAM
Payer: MEDICARE

## 2023-02-17 DIAGNOSIS — Z00.00 HEALTHCARE MAINTENANCE: ICD-10-CM

## 2023-02-17 DIAGNOSIS — E55.9 VITAMIN D DEFICIENCY: ICD-10-CM

## 2023-02-17 DIAGNOSIS — D70.8 OTHER NEUTROPENIA (HCC): ICD-10-CM

## 2023-02-17 DIAGNOSIS — E03.4 HYPOTHYROIDISM DUE TO ACQUIRED ATROPHY OF THYROID: ICD-10-CM

## 2023-02-17 LAB
25(OH)D3 SERPL-MCNC: 71 NG/ML (ref 30–100)
ALBUMIN SERPL BCP-MCNC: 4.2 G/DL (ref 3.2–4.9)
ALBUMIN/GLOB SERPL: 1.5 G/DL
ALP SERPL-CCNC: 73 U/L (ref 30–99)
ALT SERPL-CCNC: 10 U/L (ref 2–50)
ANION GAP SERPL CALC-SCNC: 6 MMOL/L (ref 7–16)
AST SERPL-CCNC: 23 U/L (ref 12–45)
BILIRUB SERPL-MCNC: 0.3 MG/DL (ref 0.1–1.5)
BUN SERPL-MCNC: 15 MG/DL (ref 8–22)
CALCIUM ALBUM COR SERPL-MCNC: 9.9 MG/DL (ref 8.5–10.5)
CALCIUM SERPL-MCNC: 10.1 MG/DL (ref 8.5–10.5)
CHLORIDE SERPL-SCNC: 107 MMOL/L (ref 96–112)
CO2 SERPL-SCNC: 29 MMOL/L (ref 20–33)
CREAT SERPL-MCNC: 0.86 MG/DL (ref 0.5–1.4)
ERYTHROCYTE [DISTWIDTH] IN BLOOD BY AUTOMATED COUNT: 51.2 FL (ref 35.9–50)
GFR SERPLBLD CREATININE-BSD FMLA CKD-EPI: 68 ML/MIN/1.73 M 2
GLOBULIN SER CALC-MCNC: 2.8 G/DL (ref 1.9–3.5)
GLUCOSE SERPL-MCNC: 86 MG/DL (ref 65–99)
HCT VFR BLD AUTO: 43.2 % (ref 37–47)
HGB BLD-MCNC: 13.8 G/DL (ref 12–16)
MCH RBC QN AUTO: 30.9 PG (ref 27–33)
MCHC RBC AUTO-ENTMCNC: 31.9 G/DL (ref 33.6–35)
MCV RBC AUTO: 96.6 FL (ref 81.4–97.8)
PLATELET # BLD AUTO: 217 K/UL (ref 164–446)
PMV BLD AUTO: 11.5 FL (ref 9–12.9)
POTASSIUM SERPL-SCNC: 4.9 MMOL/L (ref 3.6–5.5)
PROT SERPL-MCNC: 7 G/DL (ref 6–8.2)
RBC # BLD AUTO: 4.47 M/UL (ref 4.2–5.4)
SODIUM SERPL-SCNC: 142 MMOL/L (ref 135–145)
T4 FREE SERPL-MCNC: 1.01 NG/DL (ref 0.93–1.7)
TSH SERPL DL<=0.005 MIU/L-ACNC: 8.25 UIU/ML (ref 0.38–5.33)
WBC # BLD AUTO: 4.1 K/UL (ref 4.8–10.8)

## 2023-02-17 PROCEDURE — 84443 ASSAY THYROID STIM HORMONE: CPT

## 2023-02-17 PROCEDURE — 36415 COLL VENOUS BLD VENIPUNCTURE: CPT

## 2023-02-17 PROCEDURE — 85027 COMPLETE CBC AUTOMATED: CPT

## 2023-02-17 PROCEDURE — 84439 ASSAY OF FREE THYROXINE: CPT

## 2023-02-17 PROCEDURE — 80053 COMPREHEN METABOLIC PANEL: CPT

## 2023-02-17 PROCEDURE — 82306 VITAMIN D 25 HYDROXY: CPT

## 2023-02-20 DIAGNOSIS — E03.4 HYPOTHYROIDISM DUE TO ACQUIRED ATROPHY OF THYROID: ICD-10-CM

## 2023-02-20 RX ORDER — LEVOTHYROXINE SODIUM 0.07 MG/1
75 TABLET ORAL
Qty: 30 TABLET | Refills: 2 | Status: SHIPPED | OUTPATIENT
Start: 2023-02-20 | End: 2023-03-21

## 2023-03-21 DIAGNOSIS — E03.4 HYPOTHYROIDISM DUE TO ACQUIRED ATROPHY OF THYROID: ICD-10-CM

## 2023-03-21 RX ORDER — LEVOTHYROXINE SODIUM 0.07 MG/1
TABLET ORAL
Qty: 30 TABLET | Refills: 0 | Status: SHIPPED | OUTPATIENT
Start: 2023-03-21 | End: 2023-04-17

## 2023-04-15 DIAGNOSIS — E03.4 HYPOTHYROIDISM DUE TO ACQUIRED ATROPHY OF THYROID: ICD-10-CM

## 2023-04-17 RX ORDER — LEVOTHYROXINE SODIUM 0.07 MG/1
TABLET ORAL
Qty: 30 TABLET | Refills: 2 | Status: SHIPPED | OUTPATIENT
Start: 2023-04-17 | End: 2023-06-09 | Stop reason: SDUPTHER

## 2023-04-24 ENCOUNTER — TELEPHONE (OUTPATIENT)
Dept: HEALTH INFORMATION MANAGEMENT | Facility: OTHER | Age: 83
End: 2023-04-24

## 2023-06-09 ENCOUNTER — OFFICE VISIT (OUTPATIENT)
Dept: MEDICAL GROUP | Facility: PHYSICIAN GROUP | Age: 83
End: 2023-06-09
Payer: MEDICARE

## 2023-06-09 VITALS
DIASTOLIC BLOOD PRESSURE: 46 MMHG | HEIGHT: 60 IN | HEART RATE: 63 BPM | WEIGHT: 122.6 LBS | BODY MASS INDEX: 24.07 KG/M2 | SYSTOLIC BLOOD PRESSURE: 98 MMHG | OXYGEN SATURATION: 96 % | TEMPERATURE: 98.9 F

## 2023-06-09 DIAGNOSIS — E03.4 HYPOTHYROIDISM DUE TO ACQUIRED ATROPHY OF THYROID: ICD-10-CM

## 2023-06-09 DIAGNOSIS — R09.81 SINUS CONGESTION: ICD-10-CM

## 2023-06-09 DIAGNOSIS — R41.3 MEMORY CHANGES: ICD-10-CM

## 2023-06-09 DIAGNOSIS — F51.01 PRIMARY INSOMNIA: ICD-10-CM

## 2023-06-09 DIAGNOSIS — E55.9 VITAMIN D DEFICIENCY: ICD-10-CM

## 2023-06-09 PROCEDURE — 3074F SYST BP LT 130 MM HG: CPT | Performed by: STUDENT IN AN ORGANIZED HEALTH CARE EDUCATION/TRAINING PROGRAM

## 2023-06-09 PROCEDURE — 3078F DIAST BP <80 MM HG: CPT | Performed by: STUDENT IN AN ORGANIZED HEALTH CARE EDUCATION/TRAINING PROGRAM

## 2023-06-09 PROCEDURE — 99214 OFFICE O/P EST MOD 30 MIN: CPT | Performed by: STUDENT IN AN ORGANIZED HEALTH CARE EDUCATION/TRAINING PROGRAM

## 2023-06-09 RX ORDER — LEVOTHYROXINE SODIUM 0.07 MG/1
75 TABLET ORAL
Qty: 90 TABLET | Refills: 1 | Status: SHIPPED | OUTPATIENT
Start: 2023-06-09 | End: 2023-12-04

## 2023-06-09 RX ORDER — DONEPEZIL HYDROCHLORIDE 10 MG/1
10 TABLET, FILM COATED ORAL DAILY
Qty: 90 TABLET | Refills: 3 | Status: SHIPPED | OUTPATIENT
Start: 2023-06-09 | End: 2024-02-01 | Stop reason: SDUPTHER

## 2023-06-09 RX ORDER — TRAZODONE HYDROCHLORIDE 50 MG/1
50 TABLET ORAL
Qty: 90 TABLET | Refills: 1 | Status: SHIPPED | OUTPATIENT
Start: 2023-06-09 | End: 2024-02-01 | Stop reason: SDUPTHER

## 2023-06-09 ASSESSMENT — FIBROSIS 4 INDEX: FIB4 SCORE: 2.68

## 2023-06-09 NOTE — PROGRESS NOTES
CC:  Diagnoses of Hypothyroidism due to acquired atrophy of thyroid, Memory changes, Sinus congestion, Vitamin D deficiency, and Primary insomnia were pertinent to this visit.    HISTORY OF THE PRESENT ILLNESS: Patient is a 82 y.o. female. This pleasant patient is here today to discuss:    1. Hypothyroidism due to acquired atrophy of thyroid  Levothyroxine 75 mcg daily.  She has been on this dose for many years and prefers to continue.  Patient reports no symptoms of hyper/hypothyroidism.    2. Memory changes  Donepezil 10 mg daily.    3. Vitamin D deficiency  Patient continues to supplement vitamin D 1000 units daily.    4. Primary insomnia  Or difficulty with falling asleep and staying asleep.  Patient has previously used trazodone 50 mg nightly with some relief.  He has not been using this for a short while.  She has been sleeping more hours than usual, typically 12 hours/day.  She feels that this may be at least in part due to the poor quality of her sleep.    Active Diagnosis:    Patient Active Problem List   Diagnosis    Osteopenia of multiple sites    Hypothyroidism due to acquired atrophy of thyroid    Oral bisphosphonate-related jaw necrosis (HCC)    Vitamin D deficiency    Seronegative erosive rheumatoid arthritis (HCC)    Other neutropenia (HCC)    Primary insomnia    Memory changes    Other fatigue    Daytime somnolence    Osteoarthritis of right shoulder region    Papillary carcinoma, follicular variant (HCC)    Asthma    Sinus congestion    Idiopathic hypotension    COVID      Current Outpatient Medications Ordered in Epic   Medication Sig Dispense Refill    donepezil (ARICEPT) 10 MG tablet Take 1 Tablet by mouth every day. 90 Tablet 3    levothyroxine (SYNTHROID) 75 MCG Tab Take 1 Tablet by mouth every morning on an empty stomach. 90 Tablet 1    traZODone (DESYREL) 50 MG Tab Take 1 Tablet by mouth at bedtime. 90 Tablet 1    fluticasone (FLONASE) 50 MCG/ACT nasal spray Administer 1 Spray into affected  nostril(S) every day. 16 g 1    Albuterol Sulfate 108 (90 Base) MCG/ACT AEROSOL POWDER, BREATH ACTIVATED Inhale 2 Puffs every 6 hours as needed.      vitamin D (CHOLECALCIFEROL) 1000 UNIT Tab Take 1,000 Units by mouth every day.      MULTIVITAMINS PO Take 1 Tablet by mouth every day.       No current [facility]-ordered facility-administered medications on file.     ROS:   See HPI.    Objective:     Exam: BP 98/46 (BP Location: Left arm, Patient Position: Sitting, BP Cuff Size: Adult)   Pulse 63   Temp 37.2 °C (98.9 °F) (Temporal)   Ht 1.524 m (5')   Wt 55.6 kg (122 lb 9.6 oz)   SpO2 96%  Body mass index is 23.94 kg/m².    General: Normal appearing. No distress.  Pulmonary: Clear to ausculation.  Normal effort. No rales, ronchi, or wheezing.  Cardiovascular: Regular rate and rhythm without murmur.   neurologic: Grossly nonfocal.    Skin: Warm and dry.  No obvious lesions.  Musculoskeletal: No extremity cyanosis, clubbing, or edema.  Psych: Normal mood and affect.  Fluid conversation with the patient having good insight into her medical conditions.            Assessment & Plan:   82 y.o. female with the following -    Labs:   2/17/2023:  -Vitamin D 71  -TSH 8.25  -Free T4 1.01    1. Hypothyroidism due to acquired atrophy of thyroid  -Chronic.  -Elevated TSH with normal free T4.  Patient prefers to keep dose the same.  - levothyroxine (SYNTHROID) 75 MCG Tab; Take 1 Tablet by mouth every morning on an empty stomach.  Dispense: 90 Tablet; Refill: 1    2. Memory changes  -Chronic, stable.  -Patient has been sleeping more lately, this may or may not be related to cognitive decline versus sleep quality.  - donepezil (ARICEPT) 10 MG tablet; Take 1 Tablet by mouth every day.  Dispense: 90 Tablet; Refill: 3    3. Vitamin D deficiency  -Chronic, stable.  -Continue vitamin D3 1000 units daily.    4. Primary insomnia  -Chronic with recent exacerbation when not using trazodone.  -Increased risk of falls associated with trazodone  discussed with the patient and her  today.  She feels the benefits outweigh risks.  - traZODone (DESYREL) 50 MG Tab; Take 1 Tablet by mouth at bedtime.  Dispense: 90 Tablet; Refill: 1    Return in about 6 months (around 12/9/2023).  Follow-up on multiple chronic illnesses.    Please note that this dictation was created using voice recognition software. I have made every reasonable attempt to correct obvious errors, but I expect that there are errors of grammar and possibly content that I did not discover before finalizing the note.      Julio Donaldson PA-C 6/9/2023

## 2023-06-22 ENCOUNTER — OFFICE VISIT (OUTPATIENT)
Dept: NEUROLOGY | Facility: MEDICAL CENTER | Age: 83
End: 2023-06-22
Attending: PSYCHIATRY & NEUROLOGY
Payer: MEDICARE

## 2023-06-22 ENCOUNTER — HOSPITAL ENCOUNTER (OUTPATIENT)
Dept: LAB | Facility: MEDICAL CENTER | Age: 83
End: 2023-06-22
Attending: PSYCHIATRY & NEUROLOGY
Payer: MEDICARE

## 2023-06-22 ENCOUNTER — HOSPITAL ENCOUNTER (OUTPATIENT)
Dept: LAB | Facility: MEDICAL CENTER | Age: 83
End: 2023-06-22
Attending: STUDENT IN AN ORGANIZED HEALTH CARE EDUCATION/TRAINING PROGRAM
Payer: MEDICARE

## 2023-06-22 VITALS
WEIGHT: 122.8 LBS | HEIGHT: 60 IN | OXYGEN SATURATION: 95 % | HEART RATE: 65 BPM | SYSTOLIC BLOOD PRESSURE: 108 MMHG | BODY MASS INDEX: 24.11 KG/M2 | DIASTOLIC BLOOD PRESSURE: 50 MMHG | RESPIRATION RATE: 14 BRPM

## 2023-06-22 DIAGNOSIS — G31.84 AMNESTIC MCI (MILD COGNITIVE IMPAIRMENT WITH MEMORY LOSS): ICD-10-CM

## 2023-06-22 DIAGNOSIS — E03.4 HYPOTHYROIDISM DUE TO ACQUIRED ATROPHY OF THYROID: ICD-10-CM

## 2023-06-22 DIAGNOSIS — G31.89 OTHER SPECIFIED DEGENERATIVE DISEASES OF NERVOUS SYSTEM (HCC): ICD-10-CM

## 2023-06-22 LAB
FOLATE SERPL-MCNC: >40 NG/ML
TSH SERPL DL<=0.005 MIU/L-ACNC: 3.51 UIU/ML (ref 0.38–5.33)
TSH SERPL DL<=0.005 MIU/L-ACNC: 3.57 UIU/ML (ref 0.38–5.33)
VIT B12 SERPL-MCNC: >4000 PG/ML (ref 211–911)

## 2023-06-22 PROCEDURE — 3078F DIAST BP <80 MM HG: CPT | Performed by: PSYCHIATRY & NEUROLOGY

## 2023-06-22 PROCEDURE — 84425 ASSAY OF VITAMIN B-1: CPT

## 2023-06-22 PROCEDURE — 3074F SYST BP LT 130 MM HG: CPT | Performed by: PSYCHIATRY & NEUROLOGY

## 2023-06-22 PROCEDURE — 36415 COLL VENOUS BLD VENIPUNCTURE: CPT

## 2023-06-22 PROCEDURE — 99212 OFFICE O/P EST SF 10 MIN: CPT | Performed by: PSYCHIATRY & NEUROLOGY

## 2023-06-22 PROCEDURE — 84443 ASSAY THYROID STIM HORMONE: CPT | Mod: 91

## 2023-06-22 PROCEDURE — 84443 ASSAY THYROID STIM HORMONE: CPT

## 2023-06-22 PROCEDURE — 99205 OFFICE O/P NEW HI 60 MIN: CPT | Performed by: PSYCHIATRY & NEUROLOGY

## 2023-06-22 PROCEDURE — 82607 VITAMIN B-12: CPT

## 2023-06-22 PROCEDURE — 82746 ASSAY OF FOLIC ACID SERUM: CPT

## 2023-06-22 ASSESSMENT — PATIENT HEALTH QUESTIONNAIRE - PHQ9: CLINICAL INTERPRETATION OF PHQ2 SCORE: 0

## 2023-06-22 ASSESSMENT — FIBROSIS 4 INDEX: FIB4 SCORE: 2.68

## 2023-06-22 NOTE — PROGRESS NOTES
"Reason for Neurology Consult: memory disturbances for over 2 to 3 years.    History of present illness:    Cristin Elizalde 82 y.o.  right handed female who is originally from the [place] area and had 1 year of college education. She was an  for many years and moved to [facility] x 20 years.      She has noticed for the last 2-3 years that she will infrequently go into a room and not realize or remember what she went in to get or do but \"as soon as I turn around I can usually recognize/remember what I wanted to do or get.\" This seems to be more frequently occurring in over the last 12 months.    She has to write down information in general in the last 12 months> mainly appointments.    Speech-language seems well maintained to Cristin without having lucia or consistent word retrieval issues.     has noticed that Cristin that she has more problems with the day of the week and keeping up with current events. She will ask the question and within 30-60 minutes of stating the same information and this has been happening for over 12 months.    When she drinks alcohol she will repeat sentences and this tends to be only when drinking.    No history of concussion(s), stroke or tia events, seizure type episodes known,reported (in the problem list).    She walks well without declining subjective balance nor any falls/near falls in the last 6 months.    No involuntary movements of the body or limbs in the recent months.    Denies orthostatic dizziness or lucia faintness events in the last 3 months.    No problems swallowing in the recent months.      No significant tobacco use in adult life.  Rarely drinks alcohol in adult life but like a marguirita if she goes to a [employer].      Family Hx:  1 brother (age 82)- memory decline starting about 4 years and he is starting to need help making a decision.         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    Seasonal allergies        Past surgical history:   Past Surgical History:   Procedure Laterality Date    REVISION, REVERSE TOTAL ARTHROPLASTY, SHOULDER Right 5/10/2022    Procedure: Right reverse total shoulder arthroplasty, hardware removal and repairs as indicated;  Surgeon: Boston Villegas M.D.;  Location: [facility];  Service: Orthopedics    HARDWARE REMOVAL ORTHO Right 5/10/2022    Procedure: REMOVAL, HARDWARE;  Surgeon: Boston Villegas M.D.;  Location: [facility];  Service: Orthopedics    SHOULDER DECOMPRESSION ARTHROSCOPIC  10/23/08    Performed by BOSTON VILLEGAS at [facility] ORS    SHOULDER ARTHROSCOPY W/ ROTATOR CUFF REPAIR  10/23/08    Performed by BOSTON VILLEGAS at [facility]S    CLAVICLE DISTAL EXCISION  10/23/08    Performed by BOSTON VILLEGAS at [facility]    SEPTOPLASTY  2008    sinus surg    CARPAL TUNNEL REL Right 2005    BUNIONECTOMY Right 1999    BLADDER SUSPENSION  1995    surg for incontinence    TUBAL LIGATION  1970    APPENDECTOMY  1952    COLONOSCOPY  1990's         Social history:   Social History "     Socioeconomic History    Marital status:      Spouse name: Not on file    Number of children: Not on file    Years of education: Not on file    Highest education level: Some college, no degree   Occupational History    Not on file   Tobacco Use    Smoking status: Never    Smokeless tobacco: Never   Vaping Use    Vaping Use: Never used   Substance and Sexual Activity    Alcohol use: Yes     Comment: Maybe once glass per month    Drug use: Never    Sexual activity: Yes     Partners: Male     Comment:    Other Topics Concern    Not on file   Social History Narrative    Not on file     Social Determinants of Health     Financial Resource Strain: Low Risk  (7/11/2022)    Overall Financial Resource Strain (CARDIA)     Difficulty of Paying Living Expenses: Not hard at all   Food Insecurity: No Food Insecurity (7/11/2022)    Hunger Vital Sign     Worried About Running Out of Food in the Last Year: Never true     Ran Out of Food in the Last Year: Never true   Transportation Needs: No Transportation Needs (7/11/2022)    PRAPARE - Transportation     Lack of Transportation (Medical): No     Lack of Transportation (Non-Medical): No   Physical Activity: Insufficiently Active (7/11/2022)    Exercise Vital Sign     Days of Exercise per Week: 4 days     Minutes of Exercise per Session: 10 min   Stress: No Stress Concern Present (7/11/2022)    [employer] [place] [employer] - Occupational Stress Questionnaire     Feeling of Stress : Not at all   Social Connections: Socially Integrated (7/11/2022)    Social Connection and Isolation Panel [NHANES]     Frequency of Communication with Friends and Family: Three times a week     Frequency of Social Gatherings with Friends and Family: Once a week     Attends Sikh Services: 1 to 4 times per year     Active Member of Clubs or Organizations: Yes     Attends Club or Organization Meetings: 1 to 4 times per year     Marital Status:    Intimate Partner  Violence: Not on file   Housing Stability: Unknown (7/11/2022)    Housing Stability Vital Sign     Unable to Pay for Housing in the Last Year: No     Number of Places Lived in the Last Year: Not on file     Unstable Housing in the Last Year: No       Family history:   Family History   Problem Relation Age of Onset    Other Father 69        aneurysm    Diabetes Mother          Current medications:   Current Outpatient Medications   Medication    donepezil (ARICEPT) 10 MG tablet    levothyroxine (SYNTHROID) 75 MCG Tab    traZODone (DESYREL) 50 MG Tab    fluticasone (FLONASE) 50 MCG/ACT nasal spray    Albuterol Sulfate 108 (90 Base) MCG/ACT AEROSOL POWDER, BREATH ACTIVATED    MULTIVITAMINS PO    vitamin D (CHOLECALCIFEROL) 1000 UNIT Tab     No current facility-administered medications for this visit.       Medication Allergy:  Allergies   Allergen Reactions    Ibuprofen Anaphylaxis    Boniva [Ibandronic Acid] Unspecified     Had jaw necrosis.  Also had same reaction on Fosamax    Clindamycin Hives    Fosamax      Jaw necrosis. Had same reaction with Boniva.    Penicillins Hives           Physical examination:   There were no vitals filed for this visit.    Normal cephalic atraumatic.  There is full range of movement around the neck in all directions without restrictions or discrete pain evoked triggers.  No lower extremity edema.      Neurological  Exam:      [place] Cognitive Assessment (MOCA) Version 7.1    Years of Education: 1 year college.    TOTAL SCORE: 25/30  (to be scanned into the MEDIA section in the E.M.R.)          Mental status: Awake, alert and fully oriented to person, place, time, and situation. Normal attention and concentration.  Did not appear/act combative,irritable,anxious,paranoid/delusional or aggressive to or with me.    Speech and language: Speech is fluent without errors, clear, intact to repetition, and intact to naming.     Follows 3 step motor commands in sequence without significant  delay and correctly.    Cranial nerve exam:  II: Pupils are equally round and reactive to light. Visual fields are intact by confrontation.  III, IV, VI: EOMI, no diplopia, no ptosis.  V: Sensation to light touch is normal over V1-3 distributions bilaterally.  .  VII: Facial movements are symmetrical. There is no facial droop. .  VIII: Hearing intact to soft speech and finger rub bilaterally  IX: Palate elevates symmetrically, uvula is midline. Dysarthria is not present.  XI: Shoulder shrug are symmetrical and strong.   XII: Tongue protrudes midline.      Motor exam:  Muscle tone is normal in all 4 limbs. and No abnormal movements appreciated.    Muscle strength:    Neck Flexors/Extensors: 5/5       Right  Left  Deltoid   5/5  5/5      Biceps   5/5  5/5  Triceps              5/5  5/5   Wrist extensors 5/5  5/5  Wrist flexors  5/5  5/5     5/5  5/5  Interossei  5/5  5/5  Thenar (APB)  5/5  5/5   Hip flexors  5/5  5/5  Quadriceps  5/5  5/5    Hamstrings  5/5  5/5  Dorsiflexors  5/5  5/5  Plantarflexors  5/5  5/5  Toe extension  5/5  5/5      Sensory exam:  Intact to Vibration and Proprioception in bilaterally lower extremity.        Reflexes:       Right  Left  Biceps   2/4  2/4  Triceps              2/4  2/4  Brachioradialis  2/4  2/4  Knee jerk  2/4  2/4  Ankle jerk  2/4  2/4     Frontal release signs are absent    bilaterally toes are downgoing to plantar stimulation..    Coordination (finger-to-nose, heel/knee/shin, rapid alternating movements) was normal.     There was no ataxia, no tremors, and no dysmetria.     Station and gait >>easily stands up from exam chair without retropulsion,veering,leaning,swaying (to either side).       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    MOCA score of 25 today    Functional Activity Questionnaire > 10-11 per wife.    Global Deterioration Score per  in the 3 range.    There are no signs of parkinsonism at this time.    There are no features of psychosis.    Today, I reviewed the clinical criteria and reviewed several  scenarios of the differences being using the medical terms of normal brain aging (age associated memory impairment),  Mild Cognitive Impairment (MCI) and Dementia.    MCI is a syndrome but statistically and for the majority of patients  occurs due  to a more rapid aging of the brain tissue or potentially from injury to certain parts of one's brain ( often from such contributing factors as  the cumulative effects of alcohol, from one or more ischemic or hemmorhagic stroke(s), from neurodegeneration or long standing with/without suboptimally controlled Hypertension). It is for some of these potential factors as to why I recommend a brain imaging test (Head CT or Brain MRI) be done for the 1st time or in certain circumstances repeated for comparison purposes  as such imaging can suggest one or more factors as to the reason(s) for the person's cognitive/memory changes. In fact, a normal or \"age related\" finding on a brain imaging test in and of itself is useful clinical and objective information to have in the evaluation of a person who has either an acute, evolving or even chronic (months to years) long cognitive/memory complaint.    Additional factors or contributors to Brain Health issues can be summarized in several books/references which I discussed as " well today.     Goals going forwards include:    A. Paying attention to one's risk factors and reducing their prevalence or potential impact on one's changing memory/thinking> an excellent example would be to stop smoking, reduce or eliminate alcohol use (depending on the amount and frequency of usage), maintain good blood pressure control by buying a digital arm blood pressure cuff such as an OMRON Series 3 or 5 and checking one's blood pressure atleast 3 days per week (in the am and early afternoon) that the numbers are under 140/90 and working as needed with the primary care doctor  to optimize blood pressure control).      B. Encouraging proper sleep hygiene which for most adults is 7 to 8 hours of uninterrupted sleep per night.      C. Encouraging some form of exercise preferable aerobic forms to be done (4 to 5 days per week- 30 minutes minimum per day)> 150 minutes per week as a goal. Example activities could include fast walking (up a slight incline), jogging, cycling (road or stationary), swimming,tennis. Essentially, even basic walking on a flat surface or a treadmill would be better than doing nothing.    D. Maintaining or forming new social contacts with family and friends  and a positive attitude despite the concerns and/or ongoing issues with thinking and/or memory.    E. Eating well which means a diet low in salt  (under 2 grams per day), sugar and saturated fat.    F. Maintaining one's BMI (Body Mass Index) under 30.    I provided a handout to the patient about Mild Cognitive Impairment as it relates to the above topics.       G. I am keeping up with editorials and  comments from  many academic neurologists throughout the [place] who are writing reviews and summaries of the present state of this provisionally approved anti amyloid compound (aducanumab) and Leqembi.      Based on the critique of the data so far,  there are clear risks of using these compounds including the cumulative risks of microfibrosis  of the cortical brain tissue from the effects of the inflammatory removal of amyloid , the risks of potentially giving or needing to get an acute clot busting medication (like Teneceplase) to treat an acute ischemic stroke in evolution and the longer term risks of spontaneous brain bleeds and brain swelling (some of which can be life threatening events).    I have discussed with the patient and family today that given  the great controversy about the study's data and analysis of the lack of clinical  efficacy to this point in time, I feel that I need to wait and see reasonable post marketing data and/or more robust efficacy data supported by the academic community and/or the American Academy of Neurology  before making a commitment to prescribe such a compound(s)  and  where there is more than  a minor/minimal amount of risk to the patient involved in being administered this compound on a chronic (monthly) basis.    .   H. Discussed Brain Health factors and the MIND Diet.    I have performed  a history and physical exam and a directed /focused  ROS today.    Total time spent today or this patient's care was  62 minutes  and included reviewing  the diagnostic workup to date (such as labs and imaging as well as interpreting such tests relevant to this patient's neurological condition),  reviewing/obtaining separately obtained history (from patient and/or accompanying ffamily member)  for today's neurological problem(s) ,counseling and educating the patient and family member on issues related to cognition/memory and cognitive health factors and documenting  the clinical information in the EMR.    Follow up in about 6 months.        Fabricio Morales MD  [facility] [place] [facility].   [facility]

## 2023-06-26 LAB — VIT B1 BLD-MCNC: 138 NMOL/L (ref 70–180)

## 2023-07-12 ENCOUNTER — HOSPITAL ENCOUNTER (OUTPATIENT)
Dept: RADIOLOGY | Facility: MEDICAL CENTER | Age: 83
End: 2023-07-12
Attending: PSYCHIATRY & NEUROLOGY
Payer: MEDICARE

## 2023-07-12 DIAGNOSIS — G31.89 OTHER SPECIFIED DEGENERATIVE DISEASES OF NERVOUS SYSTEM (HCC): ICD-10-CM

## 2023-07-12 DIAGNOSIS — G31.84 AMNESTIC MCI (MILD COGNITIVE IMPAIRMENT WITH MEMORY LOSS): ICD-10-CM

## 2023-07-12 PROCEDURE — 70551 MRI BRAIN STEM W/O DYE: CPT

## 2023-07-17 ENCOUNTER — OFFICE VISIT (OUTPATIENT)
Dept: MEDICAL GROUP | Facility: PHYSICIAN GROUP | Age: 83
End: 2023-07-17
Payer: MEDICARE

## 2023-07-17 VITALS
DIASTOLIC BLOOD PRESSURE: 60 MMHG | BODY MASS INDEX: 23.8 KG/M2 | HEART RATE: 96 BPM | HEIGHT: 60 IN | TEMPERATURE: 97.7 F | WEIGHT: 121.2 LBS | OXYGEN SATURATION: 57 % | SYSTOLIC BLOOD PRESSURE: 100 MMHG

## 2023-07-17 DIAGNOSIS — F51.01 PRIMARY INSOMNIA: ICD-10-CM

## 2023-07-17 DIAGNOSIS — E03.4 HYPOTHYROIDISM DUE TO ACQUIRED ATROPHY OF THYROID: ICD-10-CM

## 2023-07-17 DIAGNOSIS — Z13.220 LIPID SCREENING: ICD-10-CM

## 2023-07-17 DIAGNOSIS — Z00.00 HEALTHCARE MAINTENANCE: ICD-10-CM

## 2023-07-17 PROCEDURE — 3074F SYST BP LT 130 MM HG: CPT | Performed by: STUDENT IN AN ORGANIZED HEALTH CARE EDUCATION/TRAINING PROGRAM

## 2023-07-17 PROCEDURE — 99214 OFFICE O/P EST MOD 30 MIN: CPT | Performed by: STUDENT IN AN ORGANIZED HEALTH CARE EDUCATION/TRAINING PROGRAM

## 2023-07-17 PROCEDURE — 3078F DIAST BP <80 MM HG: CPT | Performed by: STUDENT IN AN ORGANIZED HEALTH CARE EDUCATION/TRAINING PROGRAM

## 2023-07-17 ASSESSMENT — FIBROSIS 4 INDEX: FIB4 SCORE: 2.71

## 2023-07-18 NOTE — PROGRESS NOTES
CC:  Diagnoses of Healthcare maintenance, Hypothyroidism due to acquired atrophy of thyroid, Primary insomnia, and Lipid screening were pertinent to this visit.    HISTORY OF THE PRESENT ILLNESS: Patient is a 83 y.o. female. This pleasant patient is here today to discuss:    1. Healthcare maintenance  Reasonable diet, exercise and dentistry habits are reported today.    2. Hypothyroidism due to acquired atrophy of thyroid  Levothyroxine 75 mcg daily.  Patient reports full compliance.  Patient reports no symptoms of hyper/hypothyroidism.    3. Primary insomnia  Trazodone 50 mg nightly.  Patient feels this provides adequate relief of her symptoms.    4. Lipid screening    Active Diagnosis:    Patient Active Problem List   Diagnosis    Osteopenia of multiple sites    Hypothyroidism due to acquired atrophy of thyroid    Oral bisphosphonate-related jaw necrosis (HCC)    Vitamin D deficiency    Other neutropenia (HCC)    Primary insomnia    Memory changes    Other fatigue    Daytime somnolence    Osteoarthritis of right shoulder region    Papillary carcinoma, follicular variant (HCC)    Asthma    Sinus congestion    Idiopathic hypotension    COVID      Current Outpatient Medications Ordered in Epic   Medication Sig Dispense Refill    donepezil (ARICEPT) 10 MG tablet Take 1 Tablet by mouth every day. 90 Tablet 3    levothyroxine (SYNTHROID) 75 MCG Tab Take 1 Tablet by mouth every morning on an empty stomach. 90 Tablet 1    traZODone (DESYREL) 50 MG Tab Take 1 Tablet by mouth at bedtime. 90 Tablet 1    fluticasone (FLONASE) 50 MCG/ACT nasal spray Administer 1 Spray into affected nostril(S) every day. 16 g 1    Albuterol Sulfate 108 (90 Base) MCG/ACT AEROSOL POWDER, BREATH ACTIVATED Inhale 2 Puffs every 6 hours as needed.      vitamin D (CHOLECALCIFEROL) 1000 UNIT Tab Take 1,000 Units by mouth every day.      MULTIVITAMINS PO Take 1 Tablet by mouth every day.       No current [facility]-ordered facility-administered medications on  file.     ROS:   See HPI.    Objective:     Exam: /60 (BP Location: Left arm, Patient Position: Sitting, BP Cuff Size: Adult)   Pulse 96   Temp 36.5 °C (97.7 °F) (Temporal)   Ht 1.524 m (5')   Wt 55 kg (121 lb 3.2 oz)   SpO2 (!) 57%  Body mass index is 23.67 kg/m².    General: Normal appearing. No distress.  Pulmonary: Clear to ausculation.  Normal effort. No rales, ronchi, or wheezing.  Cardiovascular: Regular rate and rhythm without murmur.   neurologic: Grossly nonfocal.    Skin: Warm and dry.  No obvious lesions.  Musculoskeletal: No extremity cyanosis, clubbing, or edema.  Psych: Normal mood and affect.             Assessment & Plan:   83 y.o. female with the following -    Labs:   2/17/2023:  -CBC showing diminished WBC 4.1, RDW 51.2  -CMP, WNL    6/22/2023:  -Folic acid greater than 40  -B12 greater than 4000  -TSH 3.51    1. Healthcare maintenance  - CBC WITHOUT DIFFERENTIAL; Future  - Comp Metabolic Panel; Future  - Lipid Profile; Future    2. Hypothyroidism due to acquired atrophy of thyroid  -Chronic, stable.  Well treated without side effect.  -Continue levothyroxine 75 mcg daily.  - TSH WITH REFLEX TO FT4; Future    3. Primary insomnia  -Chronic, stable.  Well treated without side effect.  -Continue trazodone 50 mg daily.    4. Lipid screening  -Chronic.  - Lipid Profile; Future    Return in about 6 months (around 1/17/2024).  Follow-up on chronic illnesses.    Please note that this dictation was created using voice recognition software. I have made every reasonable attempt to correct obvious errors, but I expect that there are errors of grammar and possibly content that I did not discover before finalizing the note.      Julio Donaldson PA-C 7/18/2023

## 2023-08-04 ENCOUNTER — DOCUMENTATION (OUTPATIENT)
Dept: HEALTH INFORMATION MANAGEMENT | Facility: OTHER | Age: 83
End: 2023-08-04
Payer: MEDICARE

## 2023-08-18 ENCOUNTER — TELEPHONE (OUTPATIENT)
Dept: HEALTH INFORMATION MANAGEMENT | Facility: OTHER | Age: 83
End: 2023-08-18
Payer: MEDICARE

## 2023-09-22 ENCOUNTER — OFFICE VISIT (OUTPATIENT)
Dept: NEUROLOGY | Facility: MEDICAL CENTER | Age: 83
End: 2023-09-22
Attending: CLINICAL NEUROPSYCHOLOGIST
Payer: MEDICARE

## 2023-09-22 DIAGNOSIS — G31.84 AMNESTIC MCI (MILD COGNITIVE IMPAIRMENT WITH MEMORY LOSS): ICD-10-CM

## 2023-09-22 PROCEDURE — 96133 NRPSYC TST EVAL PHYS/QHP EA: CPT | Performed by: CLINICAL NEUROPSYCHOLOGIST

## 2023-09-22 PROCEDURE — 96136 PSYCL/NRPSYC TST PHY/QHP 1ST: CPT | Performed by: CLINICAL NEUROPSYCHOLOGIST

## 2023-09-22 PROCEDURE — 96132 NRPSYC TST EVAL PHYS/QHP 1ST: CPT | Performed by: CLINICAL NEUROPSYCHOLOGIST

## 2023-09-22 PROCEDURE — 96116 NUBHVL XM PHYS/QHP 1ST HR: CPT | Performed by: CLINICAL NEUROPSYCHOLOGIST

## 2023-09-22 PROCEDURE — 96137 PSYCL/NRPSYC TST PHY/QHP EA: CPT | Performed by: CLINICAL NEUROPSYCHOLOGIST

## 2023-09-22 ASSESSMENT — ANXIETY QUESTIONNAIRES
GAD7 TOTAL SCORE: 1
3. WORRYING TOO MUCH ABOUT DIFFERENT THINGS: NOT AT ALL
1. FEELING NERVOUS, ANXIOUS, OR ON EDGE: NOT AT ALL
7. FEELING AFRAID AS IF SOMETHING AWFUL MIGHT HAPPEN: NOT AT ALL
2. NOT BEING ABLE TO STOP OR CONTROL WORRYING: NOT AT ALL
5. BEING SO RESTLESS THAT IT IS HARD TO SIT STILL: NOT AT ALL
6. BECOMING EASILY ANNOYED OR IRRITABLE: NOT AT ALL
IF YOU CHECKED OFF ANY PROBLEMS ON THIS QUESTIONNAIRE, HOW DIFFICULT HAVE THESE PROBLEMS MADE IT FOR YOU TO DO YOUR WORK, TAKE CARE OF THINGS AT HOME, OR GET ALONG WITH OTHER PEOPLE: SOMEWHAT DIFFICULT
4. TROUBLE RELAXING: SEVERAL DAYS

## 2023-09-22 ASSESSMENT — PATIENT HEALTH QUESTIONNAIRE - PHQ9: CLINICAL INTERPRETATION OF PHQ2 SCORE: 0
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
## 2023-09-29 ENCOUNTER — OFFICE VISIT (OUTPATIENT)
Dept: MEDICAL GROUP | Facility: PHYSICIAN GROUP | Age: 83
End: 2023-09-29
Payer: MEDICARE

## 2023-09-29 VITALS
RESPIRATION RATE: 16 BRPM | OXYGEN SATURATION: 97 % | TEMPERATURE: 98 F | DIASTOLIC BLOOD PRESSURE: 62 MMHG | SYSTOLIC BLOOD PRESSURE: 100 MMHG | HEART RATE: 83 BPM | BODY MASS INDEX: 23.95 KG/M2 | HEIGHT: 60 IN | WEIGHT: 122 LBS

## 2023-09-29 DIAGNOSIS — Z00.00 PREVENTATIVE HEALTH CARE: ICD-10-CM

## 2023-09-29 DIAGNOSIS — M87.180: ICD-10-CM

## 2023-09-29 DIAGNOSIS — T45.8X5A: ICD-10-CM

## 2023-09-29 DIAGNOSIS — M85.89 OSTEOPENIA OF MULTIPLE SITES: ICD-10-CM

## 2023-09-29 DIAGNOSIS — R09.81 SINUS CONGESTION: ICD-10-CM

## 2023-09-29 DIAGNOSIS — G31.84 MILD COGNITIVE IMPAIRMENT: ICD-10-CM

## 2023-09-29 DIAGNOSIS — R09.82 POSTNASAL DRIP: ICD-10-CM

## 2023-09-29 DIAGNOSIS — E03.8 OTHER SPECIFIED HYPOTHYROIDISM: ICD-10-CM

## 2023-09-29 PROBLEM — I95.0 IDIOPATHIC HYPOTENSION: Status: RESOLVED | Noted: 2022-07-26 | Resolved: 2023-09-29

## 2023-09-29 PROBLEM — R40.0 DAYTIME SOMNOLENCE: Status: RESOLVED | Noted: 2021-06-15 | Resolved: 2023-09-29

## 2023-09-29 PROBLEM — U07.1 COVID: Status: RESOLVED | Noted: 2022-11-23 | Resolved: 2023-09-29

## 2023-09-29 PROCEDURE — 3074F SYST BP LT 130 MM HG: CPT | Performed by: FAMILY MEDICINE

## 2023-09-29 PROCEDURE — 3078F DIAST BP <80 MM HG: CPT | Performed by: FAMILY MEDICINE

## 2023-09-29 PROCEDURE — 99214 OFFICE O/P EST MOD 30 MIN: CPT | Performed by: FAMILY MEDICINE

## 2023-09-29 ASSESSMENT — FIBROSIS 4 INDEX: FIB4 SCORE: 2.71

## 2023-09-29 NOTE — PROGRESS NOTES
CHIEF COMPLAINT / REASON FOR VISIT  Cristin Elizalde is a 83 y.o. female that presents today to [facility] care.    HISTORY OF PRESENT ILLNESS  Moving into new home.    Persistent cough. Constantly trying to clear throat. Has sensation of postnasal drip and nasal congestion  Has albuterol.    Past Surgical History:   Procedure Laterality Date    REVISION, REVERSE TOTAL ARTHROPLASTY, SHOULDER Right 5/10/2022    Procedure: Right reverse total shoulder arthroplasty, hardware removal and repairs as indicated;  Surgeon: Boston Villegas M.D.;  Location: SURGERY [facility];  Service: Orthopedics    HARDWARE REMOVAL ORTHO Right 5/10/2022    Procedure: REMOVAL, HARDWARE;  Surgeon: Boston Villegas M.D.;  Location: SURGERY [facility];  Service: Orthopedics    SHOULDER DECOMPRESSION ARTHROSCOPIC  10/23/08    Performed by BOSTON VILLEGAS at [facility] ORS    SHOULDER ARTHROSCOPY W/ ROTATOR CUFF REPAIR  10/23/08    Performed by BOSTON VILLEGAS at [facility] ORS    CLAVICLE DISTAL EXCISION  10/23/08    Performed by BOSTON VILLEGAS at [facility] ORS    SEPTOPLASTY  2008    sinus surg    CARPAL TUNNEL REL Right 2005    BUNIONECTOMY Right 1999    BLADDER SUSPENSION  1995    surg for incontinence    TUBAL LIGATION  1970    APPENDECTOMY  1952    COLONOSCOPY  1990's     Social History     Tobacco Use    Smoking status: Never    Smokeless tobacco: Never   Vaping Use    Vaping Use: Never used   Substance Use Topics    Alcohol use: Yes     Comment: Maybe once glass per month    Drug use: Never     OBJECTIVE    /62 (BP Location: Right arm, Patient Position: Sitting, BP Cuff Size: Small adult)   Pulse 83   Temp 36.7 °C (98 °F) (Temporal)   Resp 16   Ht 1.524 m (5')   Wt 55.3 kg (122 lb)   SpO2 97%   BMI 23.83 kg/m²      PHYSICAL EXAM  Constitutional: Sitting comfortably, in no acute distress, responds to questions appropriately.  Head: Normocephalic  Eyes:  No conjunctival injection, no scleral icterus,  PERRL  Ears: External ear canals clear, TMs pearly grey with visualized bony landmarks and crisp light reflex  Mouth: Oral mucosa moist  Throat: Oropharyngeal cobblestoning  Neck: No cervical lymphadenopathy  Heart: Regular S1 S2, no murmurs, rub, or gallops  Lungs: Clear to auscultation bilaterally, no wheezes, rales, or rhonchi  Extremities: No lower extremity edema  Skin: Warm and dry, no rashes or lesions on face or exposed upper extremities    ASSESSMENT & PLAN  1. Sinus congestion  2. Postnasal drip  Chronic cough with high suspicion for upper airway cough syndrome, recommend treatment with nasal fluticasone 2 sprays each nostril daily, as well as daily antihistamine.  If no improvement will need to obtain chest x-ray, possible PFTs    3. Other specified hypothyroidism  Chronic, stable with last TSH 3.51 on 6/22/2023, continue levothyroxine 75 mcg daily.  - CBC WITH DIFFERENTIAL; Future  - Comp Metabolic Panel; Future  - TSH WITH REFLEX TO FT4; Future    4. Oral bisphosphonate-related jaw necrosis (HCC)  5. Osteopenia of multiple sites  Reports jaw necrosis with 2 separate oral bisphosphonates including alendronate.  Last DEXA 10/5/2020 showed osteopenia of the lumbar spine and proximal left femur.  Recommend regular weightbearing exercise, as well as adequate vitamin D and calcium intake.    6. 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 Currently on donepezil 10 mg daily.  Has upcoming follow-up visit with neurology  - CBC WITH DIFFERENTIAL; Future  - Comp Metabolic Panel; Future  - TSH WITH REFLEX TO FT4; Future    7. Preventative health care  - CBC WITH DIFFERENTIAL; Future  - Comp Metabolic Panel; Future  - TSH WITH REFLEX TO FT4; Future    Follow-up visit after lab work February 2024

## 2023-11-02 ENCOUNTER — APPOINTMENT (OUTPATIENT)
Dept: NEUROLOGY | Facility: MEDICAL CENTER | Age: 83
End: 2023-11-02
Attending: CLINICAL NEUROPSYCHOLOGIST
Payer: MEDICARE

## 2023-11-29 ENCOUNTER — PATIENT MESSAGE (OUTPATIENT)
Dept: HEALTH INFORMATION MANAGEMENT | Facility: OTHER | Age: 83
End: 2023-11-29

## 2023-12-04 ENCOUNTER — OFFICE VISIT (OUTPATIENT)
Dept: NEUROLOGY | Facility: MEDICAL CENTER | Age: 83
End: 2023-12-04
Attending: PSYCHIATRY & NEUROLOGY
Payer: MEDICARE

## 2023-12-04 VITALS
HEIGHT: 60 IN | HEART RATE: 66 BPM | DIASTOLIC BLOOD PRESSURE: 60 MMHG | TEMPERATURE: 96.7 F | SYSTOLIC BLOOD PRESSURE: 102 MMHG | BODY MASS INDEX: 25.15 KG/M2 | WEIGHT: 128.09 LBS | OXYGEN SATURATION: 97 %

## 2023-12-04 DIAGNOSIS — G31.84 AMNESTIC MCI (MILD COGNITIVE IMPAIRMENT WITH MEMORY LOSS): Primary | ICD-10-CM

## 2023-12-04 PROCEDURE — 99215 OFFICE O/P EST HI 40 MIN: CPT | Performed by: PSYCHIATRY & NEUROLOGY

## 2023-12-04 PROCEDURE — 99212 OFFICE O/P EST SF 10 MIN: CPT | Performed by: PSYCHIATRY & NEUROLOGY

## 2023-12-04 PROCEDURE — 3074F SYST BP LT 130 MM HG: CPT | Performed by: PSYCHIATRY & NEUROLOGY

## 2023-12-04 PROCEDURE — 3078F DIAST BP <80 MM HG: CPT | Performed by: PSYCHIATRY & NEUROLOGY

## 2023-12-04 RX ORDER — LEVOTHYROXINE SODIUM 0.05 MG/1
50 TABLET ORAL
COMMUNITY
End: 2024-02-01 | Stop reason: SDUPTHER

## 2023-12-04 ASSESSMENT — PATIENT HEALTH QUESTIONNAIRE - PHQ9: CLINICAL INTERPRETATION OF PHQ2 SCORE: 0

## 2023-12-04 ASSESSMENT — MONTREAL COGNITIVE ASSESSMENT (MOCA)
4. NAME EACH OF THE THREE ANIMALS SHOWN: 3/3
9. REPEAT EACH SENTENCE: 2/2
WHAT IS THE TOTAL SCORE (OUT OF 30): 26
3. DRAW A CLOCK: CONTOUR, NUMBERS, HANDS: 3/3
WHAT IS THE VERSION OF MOCA ADMINISTERED: 8.1
7. [VIGILENCE] TAP WHEN HEARING DESIGNATED LETTER: 1/1
DELAYED RECALL SUBSCORE: 3/5
1. ALTERNATING TRAIL MAKING: 1/1
2. COPY DRAWING: 0/1
8. SERIAL SUBTRACTION OF 7S: 2 OR 3/5
11. FOR EACH PAIR OF WORDS, WHAT CATEGORY DO THEY BELONG TO (OUT OF 2): 2/2
5. MEMORY TRIALS: SECOND TRIAL
ORIENTATION SUBSCORE: 6/6
10. [FLUENCY] NAME WORDS STARTING WITH DESIGNATED LETTER: 1/1
6. READ LIST OF DIGITS [FORWARD/BACKWARD]: 2/2

## 2023-12-04 ASSESSMENT — FIBROSIS 4 INDEX: FIB4 SCORE: 2.71

## 2023-12-04 NOTE — PROGRESS NOTES
"Reason for Neurology Consult: memory disturbances for over 2 to 3 years.     History of present illness:     Cristin Elizalde 82 y.o.  right handed female who is originally from the [place] area and had 1 year of college education. She was an  for many years and moved to [facility] x 20 years ([place]) and then to [facility] and/or [place] since 1984.        She has noticed for the last 2-3 years that she will infrequently go into a room and not realize or remember what she went in to get or do but \"as soon as I turn around I can usually recognize/remember what I wanted to do or get.\" This seems to be more frequently occurring in over the last 12 months.    She has to write down information in general in the last 12 months> mainly appointments.    Since June 2023 and our last visit     Speech-language seems well maintained to Cristin without having lucia or consistent word retrieval issues.      has noticed that Cristin that she has more problems with the day of the week and keeping up with current events. She will ask the question and within 30-60 minutes of stating the same information and this has been happening for over 12 months.     When she drinks alcohol she will repeat sentences and this tends to be only when drinking.     No history of concussion(s), stroke or tia events, seizure type episodes known,reported (in the problem list).    She walks well without declining subjective balance nor any falls/near falls in the last 6 months.     No involuntary movements of the body or limbs in the recent months.     Denies orthostatic dizziness or lucia faintness events in the last 3 months.     No problems swallowing in the recent months.        No significant tobacco use in adult life.  Rarely drinks alcohol in adult life but like a marguirita if she goes to a [employer].      Family Hx:  1 brother (age 82)- memory decline starting about 4 years and he is starting to need help " "making a decision.       Patient Active Problem List    Diagnosis Date Noted    Asthma 07/26/2022    Sinus congestion 07/26/2022    Osteoarthritis of right shoulder region 02/02/2022    Other fatigue 06/15/2021    Amnestic MCI (mild cognitive impairment with memory loss) 08/13/2020    Primary insomnia 08/12/2019    Other neutropenia (HCC) 08/07/2018    Oral bisphosphonate-related jaw necrosis (HCC) 11/18/2016    Vitamin D deficiency 11/18/2016    Osteopenia of multiple sites 05/04/2016    Other specified hypothyroidism 05/04/2016       Past medical history:   Past Medical History:   Diagnosis Date    Arthritis     Osteo    ASTHMA     rare use of inhaler    Hypothyroidism (acquired)     Insomnia     Memory loss     \"a little bit\"     Osteoporosis     Pain     Right shoulder pain 04/25/2022    constant dull ache, and positional pain.    Seasonal allergies        Past surgical history:   Past Surgical History:   Procedure Laterality Date    REVISION, REVERSE TOTAL ARTHROPLASTY, SHOULDER Right 5/10/2022    Procedure: Right reverse total shoulder arthroplasty, hardware removal and repairs as indicated;  Surgeon: Boston Villegas M.D.;  Location: [facility];  Service: Orthopedics    HARDWARE REMOVAL ORTHO Right 5/10/2022    Procedure: REMOVAL, HARDWARE;  Surgeon: Boston Villegas M.D.;  Location: [facility];  Service: Orthopedics    SHOULDER DECOMPRESSION ARTHROSCOPIC  10/23/08    Performed by BOSTON VILLEGAS at [facility] ORS    SHOULDER ARTHROSCOPY W/ ROTATOR CUFF REPAIR  10/23/08    Performed by BOSTON VILLEGAS at [facility] ORS    CLAVICLE DISTAL EXCISION  10/23/08    Performed by BOSTON VILLEGAS at [facility] ORS    SEPTOPLASTY  2008    sinus surg    CARPAL TUNNEL REL Right 2005    BUNIONECTOMY Right 1999    BLADDER SUSPENSION  1995    surg for incontinence    TUBAL LIGATION  1970    APPENDECTOMY  1952    COLONOSCOPY  1990's         Social history:   Social History     Socioeconomic " History    Marital status:      Spouse name: Not on file    Number of children: Not on file    Years of education: Not on file    Highest education level: Some college, no degree   Occupational History    Not on file   Tobacco Use    Smoking status: Never    Smokeless tobacco: Never   Vaping Use    Vaping Use: Never used   Substance and Sexual Activity    Alcohol use: Yes     Comment: Maybe once glass per month    Drug use: Never    Sexual activity: Yes     Partners: Male     Comment:    Other Topics Concern    Not on file   Social History Narrative    Not on file     Social Determinants of Health     Financial Resource Strain: Low Risk  (7/11/2022)    Overall Financial Resource Strain (CARDIA)     Difficulty of Paying Living Expenses: Not hard at all   Food Insecurity: No Food Insecurity (7/11/2022)    Hunger Vital Sign     Worried About Running Out of Food in the Last Year: Never true     Ran Out of Food in the Last Year: Never true   Transportation Needs: No Transportation Needs (7/11/2022)    PRAPARE - Transportation     Lack of Transportation (Medical): No     Lack of Transportation (Non-Medical): No   Physical Activity: Insufficiently Active (7/11/2022)    Exercise Vital Sign     Days of Exercise per Week: 4 days     Minutes of Exercise per Session: 10 min   Stress: No Stress Concern Present (7/11/2022)    [place] [employer] - Occupational Stress Questionnaire     Feeling of Stress : Not at all   Social Connections: Socially Integrated (7/11/2022)    Social Connection and Isolation Panel [NHANES]     Frequency of Communication with Friends and Family: Three times a week     Frequency of Social Gatherings with Friends and Family: Once a week     Attends Yazdanism Services: 1 to 4 times per year     Active Member of Clubs or Organizations: Yes     Attends Club or Organization Meetings: 1 to 4 times per year     Marital Status:    Intimate Partner Violence: Not on file    Housing Stability: Unknown (7/11/2022)    Housing Stability Vital Sign     Unable to Pay for Housing in the Last Year: No     Number of Places Lived in the Last Year: Not on file     Unstable Housing in the Last Year: No       Family history:   Family History   Problem Relation Age of Onset    Other Father 69        aneurysm    Diabetes Mother          Current medications:   Current Outpatient Medications   Medication    donepezil (ARICEPT) 10 MG tablet    levothyroxine (SYNTHROID) 75 MCG Tab    traZODone (DESYREL) 50 MG Tab    fluticasone (FLONASE) 50 MCG/ACT nasal spray    Albuterol Sulfate 108 (90 Base) MCG/ACT AEROSOL POWDER, BREATH ACTIVATED    vitamin D (CHOLECALCIFEROL) 1000 UNIT Tab    MULTIVITAMINS PO     No current facility-administered medications for this visit.       Medication Allergy:  Allergies   Allergen Reactions    Ibuprofen Anaphylaxis    Boniva [Ibandronic Acid] Unspecified     Had jaw necrosis.  Also had same reaction on Fosamax    Clindamycin Hives    Fosamax      Jaw necrosis. Had same reaction with Boniva.    Penicillins Hives           Physical examination:   Vitals:    12/04/23 0857   BP: 102/60   BP Location: Left arm   Patient Position: Sitting   BP Cuff Size: Adult   Pulse: 66   Temp: 35.9 °C (96.7 °F)   TempSrc: Temporal   SpO2: 97%   Weight: 58.1 kg (128 lb 1.4 oz)   Height: 1.524 m (5')       Normal cephalic atraumatic.  There is full range of movement around the neck in all directions without restrictions or discrete pain evoked triggers.  No lower extremity edema.      Neurological  Exam:      [employer] Cognitive Assessment (MOCA) Version 7.1    Years of Education: 2 years of [employer]    TOTAL SCORE: 25/30  (to be scanned into the MEDIA section in the E.M.R.)          Mental status: Awake, alert and fully oriented to person, place, time, and situation. Normal attention and concentration.  Did not appear/act combative,irritable,anxious,paranoid/delusional or aggressive to or  with me.    Speech and language: Speech is fluent without errors, clear, intact to repetition, and intact to naming.     Follows 3 step motor commands in sequence without significant delay and correctly.    Cranial nerve exam:  II: Pupils are equally round and reactive to light. Visual fields are intact by confrontation.  III, IV, VI: EOMI, no diplopia, no ptosis.  V: Sensation to light touch is normal over V1-3 distributions bilaterally.  .  VII: Facial movements are symmetrical. There is no facial droop. .  VIII: Hearing intact to soft speech and finger rub bilaterally  IX: Palate elevates symmetrically, uvula is midline. Dysarthria is not present.  XI: Shoulder shrug are symmetrical and strong.   XII: Tongue protrudes midline.      Motor exam:  Muscle tone is normal in all 4 limbs. and No abnormal movements appreciated.    Muscle strength:    Neck Flexors/Extensors: 5/5       Right  Left  Deltoid   5/5  5/5      Biceps   5/5  5/5  Triceps               5/5  5/5   Wrist extensors 5/5  5/5  Wrist flexors  5/5  5/5     5/5  5/5  Interossei  5/5  5/5  Thenar (APB)  5/5  5/5   Hip flexors  5/5  5/5  Quadriceps  5/5  5/5    Hamstrings  5/5  5/5  Dorsiflexors  5/5  5/5  Plantarflexors  5/5  5/5  Toe extension  5/5  5/5      Reflexes:       Right  Left  Biceps   2/4  2/4  Triceps              2/4  2/4  Brachioradialis 2/4  2/4  Knee jerk  2/4  2/4  Ankle jerk  2/4  2/4     Frontal release signs are absent    bilaterally toes are normal.    Coordination (finger-to-nose, heel/knee/shin, rapid alternating movements) was normal.     There was no ataxia, no tremors, and no dysmetria.     Station and gait > easily stands up from exam chair without retropulsion,veering,leaning,swaying (to either side).         NEUROIMAGING:     Brain MRI from 7/2023    MPRESSION:     1.  Mild cerebral atrophy. Age-appropriate. Atrophy is generalized and there is no disproportionate temporal lobe or hippocampal atrophy.  2.  Moderate  supratentorial white matter disease most consistent with microvascular ischemic change.  3.  Minimal pontine ischemic gliosis.  4.  No evidence of acute infarction, hemorrhage, or mass lesion. No imaging pattern indicative of specific neurodegenerative disease.           Exam Ended: 07/12/23  6:38 PM Last Resulted: 07/12/23  9:54 PM                 VITAMIN B12  Order: [id number] - Reflex for Order [id number]  Status: Final result       Visible to patient: Yes (not seen)       Next appt: 02/02/2024 at 11:40 AM in [facility] (Julio Montgomery M.D.)    0 Result Notes       1 Patient Communication       Component  Ref Range & Units 5 mo ago 2 yr ago   Vitamin B12 -True Cobalamin  211 - 911 pg/mL >4000 High  1264 High    Comment: Results obtained by dilution.   Resulting Agency [facility]              Specimen Collected: 06/22/23  4:12 PM Last Resulted: 06/22/23 10:10 PM           FOLATE  Order: [id number]  Status: Final result       Visible to patient: Yes (not seen)       Next appt: 02/02/2024 at 11:40 AM in [facility] (Julio Montgomery M.D.)    0 Result Notes       1 Patient Communication      Component  Ref Range & Units 5 mo ago   Folate -Folic Acid  >4.0 ng/mL >40.0   Comment: Results obtained by dilution.   Resulting Agency [facility]              Specimen Collected: 06/22/23  4:12 PM Last Resulted: 06/22/23 10:10 PM             1 Patient Communication          Component  Ref Range & Units 5 mo ago  (6/22/23) 9 mo ago  (2/17/23) 2 yr ago  (6/16/21) 2 yr ago  (3/18/21) 3 yr ago  (8/14/20)   TSH  0.380 - 5.330 uIU/mL 3.570 8.250 High  CM 2.040 CM 0.740 CM 3.230 CM   Comment: The 2011 American Thyroid Association (GABRIEL) guidelines  recommended that the interpretation of thyroid function in  pregnancy be based on trimester specific reference ranges.    1st Trimester  0.100-2.500 mIU/L  2nd Trimester  0.200-3.000 mIU/L  3rd Trimester  0.300-3.500 mIU/L        VITAMIN B1  Order: [id number]  Status: Final result       Visible to  "patient: Yes (not seen)       Next appt: 02/02/2024 at 11:40 AM in [facility] (Julio Montgomery M.D.)       Dx: Amnestic MCI (mild cognitive impairme...    0 Result Notes      Component  Ref Range & Units 5 mo ago   Vitamin B1  70 - 180 nmol/L 138   Comment: INTERPRETIVE INFORMATION: Vitamin B1, Whole Blood  This assay measures the concentration of thiamine diphosphate  (TDP), the primary active form of vitamin B1. Approximately 90  percent of vitamin B1 present in whole blood is TDP. Thiamine and  thiamine monophosphate, which comprise the remaining 10 percent,  are not measured.  This test was developed and its performance characteristics  determined by [facility]. It has not been cleared or  approved by the US Food and Drug Administration. This test was  performed in a CLIA certified laboratory and is intended for  clinical purposes.  Performed By: [facility]  [address] [place] [id number]  : Fabricio Boo MD, PhD   Resulting Agency [facility]              Specimen Collected: 06/22/23  4:12 PM Last Resulted: 06/26/23  6:23 AM             Impression/Plans/Recommendations:    Mild Cognitive Impairment (mild Neurocognitive Disorder).    Results from Dr. Arboleda ([facility] Neuropsychologist) as below:    \"Impression: The patient's cognitive profile revealed impairments in aspects of memory, with particular difficulty in memory retrieval. 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 Etiologically, her improvement in memory performance with recognition cues on most measures was indicative of executive dysfunction interfering with retrieval, consistent with what is typically seen in patients with vascular cognitive impairment. This is corroborated by her brain MRI findings of moderate chronic microvascular ischemic disease, though she does not have numerous " "vascular risk factors in her medical history. Her poor rote verbal memory performance, flat learning curves, and low retention rates on all memory measures remain somewhat concerning for incipient Alzheimer's disease (AD). However, AD is less likely at this time given her intact language semantic processing, story recognition memory, and visual recognition memory. Additional possible contributing factors to her cognitive difficulties include sleep problems, daytime fatigue, and current stressors. This evaluation may serve as a baseline for longitudinal tracking.  \"       MOCA score of 25 today     Functional Activity Questionnaire > 10-11 per .     Global Deterioration Score per  in the 2 to 3 range.     There are no signs of parkinsonism at this time.     There are no features of psychosis at this time.       I have performed  a history and physical exam and a directed /focused  ROS today.    Plans:    A. Brain Health topics reviewed today including some books to read about this topic.    B.  Anti amyloid IV medications reviewed and decision as not to pursue such mediations. I gave Cristin/ a hand about on this topic.    C. Will hold off on Brain PET imaging at this time after discussing this test with Cristin and .    D.  We discussed Aricept/cognitive enhancers and use of it /them which she has been prescribed by her primary care doctor. We discussed the pros and cons of stopping this medication at this time and the decision was made to stop it.    Total time spent today or this patient's care was 54  minutes  and included reviewing  the diagnostic workup to date (such as labs and imaging as well as interpreting such tests relevant to this patient's neurological condition),  reviewing/obtaining separately obtained history (from patient and/or accompanying ffamily member)  for today's neurological problem(s) ,counseling and educating the patient and family member on issues related to " cognition/memory and cognitive health factors and documenting  the clinical information in the EMR.    Follow up in about 6 months or so.        Fabricio Morales MD  [place] [facility].   [facility]

## 2023-12-05 ENCOUNTER — APPOINTMENT (RX ONLY)
Dept: URBAN - METROPOLITAN AREA CLINIC 4 | Facility: CLINIC | Age: 83
Setting detail: DERMATOLOGY
End: 2023-12-05

## 2023-12-05 DIAGNOSIS — D18.0 HEMANGIOMA: ICD-10-CM

## 2023-12-05 DIAGNOSIS — L82.0 INFLAMED SEBORRHEIC KERATOSIS: ICD-10-CM

## 2023-12-05 DIAGNOSIS — L82.1 OTHER SEBORRHEIC KERATOSIS: ICD-10-CM

## 2023-12-05 DIAGNOSIS — D22 MELANOCYTIC NEVI: ICD-10-CM

## 2023-12-05 DIAGNOSIS — L81.4 OTHER MELANIN HYPERPIGMENTATION: ICD-10-CM

## 2023-12-05 DIAGNOSIS — L57.8 OTHER SKIN CHANGES DUE TO CHRONIC EXPOSURE TO NONIONIZING RADIATION: ICD-10-CM

## 2023-12-05 DIAGNOSIS — L57.0 ACTINIC KERATOSIS: ICD-10-CM

## 2023-12-05 PROBLEM — D18.01 HEMANGIOMA OF SKIN AND SUBCUTANEOUS TISSUE: Status: ACTIVE | Noted: 2023-12-05

## 2023-12-05 PROBLEM — D22.5 MELANOCYTIC NEVI OF TRUNK: Status: ACTIVE | Noted: 2023-12-05

## 2023-12-05 PROCEDURE — ? COUNSELING

## 2023-12-05 PROCEDURE — ? LIQUID NITROGEN

## 2023-12-05 PROCEDURE — 17110 DESTRUCTION B9 LES UP TO 14: CPT

## 2023-12-05 PROCEDURE — ? OBSERVATION

## 2023-12-05 PROCEDURE — 17000 DESTRUCT PREMALG LESION: CPT | Mod: 59

## 2023-12-05 PROCEDURE — 99213 OFFICE O/P EST LOW 20 MIN: CPT | Mod: 25

## 2023-12-05 ASSESSMENT — LOCATION SIMPLE DESCRIPTION DERM
LOCATION SIMPLE: CHEST
LOCATION SIMPLE: RIGHT FOREARM
LOCATION SIMPLE: LEFT LOWER BACK
LOCATION SIMPLE: LEFT FOREARM
LOCATION SIMPLE: LEFT UPPER ARM
LOCATION SIMPLE: RIGHT UPPER ARM
LOCATION SIMPLE: RIGHT CHEEK
LOCATION SIMPLE: RIGHT BREAST
LOCATION SIMPLE: LEFT CHEEK
LOCATION SIMPLE: RIGHT THIGH
LOCATION SIMPLE: LEFT THIGH
LOCATION SIMPLE: RIGHT UPPER BACK

## 2023-12-05 ASSESSMENT — LOCATION ZONE DERM
LOCATION ZONE: FACE
LOCATION ZONE: ARM
LOCATION ZONE: TRUNK
LOCATION ZONE: LEG

## 2023-12-05 ASSESSMENT — LOCATION DETAILED DESCRIPTION DERM
LOCATION DETAILED: LEFT SUPERIOR LATERAL MIDBACK
LOCATION DETAILED: LEFT INFERIOR CENTRAL MALAR CHEEK
LOCATION DETAILED: LEFT MEDIAL SUPERIOR CHEST
LOCATION DETAILED: LEFT ANTERIOR DISTAL THIGH
LOCATION DETAILED: RIGHT INFERIOR UPPER BACK
LOCATION DETAILED: MIDDLE STERNUM
LOCATION DETAILED: RIGHT ANTERIOR DISTAL THIGH
LOCATION DETAILED: RIGHT CENTRAL MALAR CHEEK
LOCATION DETAILED: RIGHT MID-UPPER BACK
LOCATION DETAILED: RIGHT ANTERIOR PROXIMAL UPPER ARM
LOCATION DETAILED: RIGHT DISTAL DORSAL FOREARM
LOCATION DETAILED: RIGHT SUPERIOR MEDIAL UPPER BACK
LOCATION DETAILED: LEFT DISTAL DORSAL FOREARM
LOCATION DETAILED: RIGHT LATERAL BREAST 10-11:00 REGION
LOCATION DETAILED: LEFT ANTERIOR PROXIMAL UPPER ARM

## 2023-12-05 NOTE — PROCEDURE: LIQUID NITROGEN
Render Post-Care Instructions In Note?: no
Number Of Freeze-Thaw Cycles: 2 freeze-thaw cycles
Detail Level: Simple
Aperture Size (Optional): C
Show Aperture Variable?: Yes
Consent: The patient's consent was obtained including but not limited to risks of crusting, scabbing, blistering, scarring, darker or lighter pigmentary change, recurrence, incomplete removal and infection.
Post-Care Instructions: I reviewed with the patient in detail post-care instructions. Patient is to wear sunprotection, and avoid picking at any of the treated lesions. Pt may apply Vaseline to crusted or scabbing areas.
Duration Of Freeze Thaw-Cycle (Seconds): 3
Number Of Freeze-Thaw Cycles: 1 freeze-thaw cycle
Medical Necessity Clause: This procedure was medically necessary because the lesions that were treated were:
Detail Level: Detailed
Spray Paint Text: The liquid nitrogen was applied to the skin utilizing a spray paint frosting technique.
Medical Necessity Information: It is in your best interest to select a reason for this procedure from the list below. All of these items fulfill various CMS LCD requirements except the new and changing color options.

## 2023-12-11 ENCOUNTER — OFFICE VISIT (OUTPATIENT)
Dept: NEUROLOGY | Facility: MEDICAL CENTER | Age: 83
End: 2023-12-11
Attending: CLINICAL NEUROPSYCHOLOGIST
Payer: MEDICARE

## 2023-12-11 DIAGNOSIS — G31.84 AMNESTIC MCI (MILD COGNITIVE IMPAIRMENT WITH MEMORY LOSS): ICD-10-CM

## 2023-12-11 PROCEDURE — 96132 NRPSYC TST EVAL PHYS/QHP 1ST: CPT | Performed by: CLINICAL NEUROPSYCHOLOGIST

## 2023-12-11 NOTE — PROGRESS NOTES
NEUROPSYCHOLOGICAL FEEDBACK    Name: Cristin Elizalde    MRN: [id number]   YOB: 1940   Date of Feedback: 12/11/2023  Referred by: Fabricio Morales M.D.  Evaluated by: Vikash Arboleda, Ph.D.        The patient and and her  were seen for an interactive feedback session regarding the patient's neuropsychological evaluation on 09/22/2023. They reported she has been stable in terms of cognitive, functional, emotional, and physical functioning since the neuropsychological evaluation. I discussed the results of the evaluation and the recommendations in detail with the patient as well as her  and they expressed understanding. The discussion included psychoeducation about mild cognitive impairment, vascular cognitive impairment, different types of dementia, and other possible contributors to her cognitive difficulties. Psychoeducation was also provided regarding how poor sleep, fatigue, and elevated stress can affect cognition. Additional information was provided regarding lifestyle factors associated with maintaining brain health. Follow-up with the referring provider to discuss treatment planning was recommended. The patient and her spouse were afforded time to ask questions and all their questions were addressed.      Vikash Arboleda, Ph.D.                                                                             Clinical Neuropsychologist                                                                               Department of Neurology                                                                      [facility]    Fifty-three minutes were spent conducting an interactive feedback session with the patient and her  (40351 = 1 unit).

## 2024-01-24 NOTE — PROCEDURE: COUNSELING
PATIENT RECEIVED FROM PACU TO [facility]. AMBULATORY TO BATHROOM TO VOID. PATIENT STATES SHE HAS SMALL AMOUNT VAGINAL BLEEDING WITH SOME CLOTS NOTED. PATIENT TOLERATING SIPS SODA WELL.. DISCHARGE INSTRUCTIONS REVIEWED WITH PATIENT. THELMA   Detail Level: Zone

## 2024-01-25 ENCOUNTER — HOSPITAL ENCOUNTER (OUTPATIENT)
Dept: LAB | Facility: MEDICAL CENTER | Age: 84
End: 2024-01-25
Attending: FAMILY MEDICINE
Payer: MEDICARE

## 2024-01-25 ENCOUNTER — HOSPITAL ENCOUNTER (OUTPATIENT)
Dept: LAB | Facility: MEDICAL CENTER | Age: 84
End: 2024-01-25
Attending: STUDENT IN AN ORGANIZED HEALTH CARE EDUCATION/TRAINING PROGRAM
Payer: MEDICARE

## 2024-01-25 DIAGNOSIS — E03.8 OTHER SPECIFIED HYPOTHYROIDISM: ICD-10-CM

## 2024-01-25 DIAGNOSIS — Z00.00 PREVENTATIVE HEALTH CARE: ICD-10-CM

## 2024-01-25 DIAGNOSIS — E03.4 HYPOTHYROIDISM DUE TO ACQUIRED ATROPHY OF THYROID: ICD-10-CM

## 2024-01-25 DIAGNOSIS — G31.84 MILD COGNITIVE IMPAIRMENT: ICD-10-CM

## 2024-01-25 DIAGNOSIS — Z13.220 LIPID SCREENING: ICD-10-CM

## 2024-01-25 DIAGNOSIS — Z00.00 HEALTHCARE MAINTENANCE: ICD-10-CM

## 2024-01-25 LAB
ALBUMIN SERPL BCP-MCNC: 4.2 G/DL (ref 3.2–4.9)
ALBUMIN/GLOB SERPL: 1.3 G/DL
ALP SERPL-CCNC: 93 U/L (ref 30–99)
ALT SERPL-CCNC: 8 U/L (ref 2–50)
ANION GAP SERPL CALC-SCNC: 14 MMOL/L (ref 7–16)
AST SERPL-CCNC: 23 U/L (ref 12–45)
BASOPHILS # BLD AUTO: 1.4 % (ref 0–1.8)
BASOPHILS # BLD: 0.06 K/UL (ref 0–0.12)
BILIRUB SERPL-MCNC: 0.4 MG/DL (ref 0.1–1.5)
BUN SERPL-MCNC: 15 MG/DL (ref 8–22)
CALCIUM ALBUM COR SERPL-MCNC: 9.7 MG/DL (ref 8.5–10.5)
CALCIUM SERPL-MCNC: 9.9 MG/DL (ref 8.5–10.5)
CHLORIDE SERPL-SCNC: 107 MMOL/L (ref 96–112)
CHOLEST SERPL-MCNC: 183 MG/DL (ref 100–199)
CO2 SERPL-SCNC: 23 MMOL/L (ref 20–33)
CREAT SERPL-MCNC: 0.83 MG/DL (ref 0.5–1.4)
EOSINOPHIL # BLD AUTO: 0.17 K/UL (ref 0–0.51)
EOSINOPHIL NFR BLD: 4.1 % (ref 0–6.9)
ERYTHROCYTE [DISTWIDTH] IN BLOOD BY AUTOMATED COUNT: 44.8 FL (ref 35.9–50)
GFR SERPLBLD CREATININE-BSD FMLA CKD-EPI: 71 ML/MIN/1.73 M 2
GLOBULIN SER CALC-MCNC: 3.3 G/DL (ref 1.9–3.5)
GLUCOSE SERPL-MCNC: 70 MG/DL (ref 65–99)
HCT VFR BLD AUTO: 44.7 % (ref 37–47)
HDLC SERPL-MCNC: 66 MG/DL
HGB BLD-MCNC: 14.4 G/DL (ref 12–16)
IMM GRANULOCYTES # BLD AUTO: 0.01 K/UL (ref 0–0.11)
IMM GRANULOCYTES NFR BLD AUTO: 0.2 % (ref 0–0.9)
LDLC SERPL CALC-MCNC: 96 MG/DL
LYMPHOCYTES # BLD AUTO: 1.52 K/UL (ref 1–4.8)
LYMPHOCYTES NFR BLD: 36.3 % (ref 22–41)
MCH RBC QN AUTO: 30.3 PG (ref 27–33)
MCHC RBC AUTO-ENTMCNC: 32.2 G/DL (ref 32.2–35.5)
MCV RBC AUTO: 94.1 FL (ref 81.4–97.8)
MONOCYTES # BLD AUTO: 0.34 K/UL (ref 0–0.85)
MONOCYTES NFR BLD AUTO: 8.1 % (ref 0–13.4)
NEUTROPHILS # BLD AUTO: 2.09 K/UL (ref 1.82–7.42)
NEUTROPHILS NFR BLD: 49.9 % (ref 44–72)
NRBC # BLD AUTO: 0 K/UL
NRBC BLD-RTO: 0 /100 WBC (ref 0–0.2)
PLATELET # BLD AUTO: 245 K/UL (ref 164–446)
PMV BLD AUTO: 11.6 FL (ref 9–12.9)
POTASSIUM SERPL-SCNC: 4.3 MMOL/L (ref 3.6–5.5)
PROT SERPL-MCNC: 7.5 G/DL (ref 6–8.2)
RBC # BLD AUTO: 4.75 M/UL (ref 4.2–5.4)
SODIUM SERPL-SCNC: 144 MMOL/L (ref 135–145)
TRIGL SERPL-MCNC: 106 MG/DL (ref 0–149)
TSH SERPL DL<=0.005 MIU/L-ACNC: 5.31 UIU/ML (ref 0.38–5.33)
WBC # BLD AUTO: 4.2 K/UL (ref 4.8–10.8)

## 2024-01-25 PROCEDURE — 85025 COMPLETE CBC W/AUTO DIFF WBC: CPT

## 2024-01-25 PROCEDURE — 80053 COMPREHEN METABOLIC PANEL: CPT

## 2024-01-25 PROCEDURE — 36415 COLL VENOUS BLD VENIPUNCTURE: CPT

## 2024-01-25 PROCEDURE — 80061 LIPID PANEL: CPT

## 2024-01-25 PROCEDURE — 84443 ASSAY THYROID STIM HORMONE: CPT

## 2024-02-01 ENCOUNTER — OFFICE VISIT (OUTPATIENT)
Dept: MEDICAL GROUP | Facility: PHYSICIAN GROUP | Age: 84
End: 2024-02-01
Payer: MEDICARE

## 2024-02-01 VITALS
BODY MASS INDEX: 25.13 KG/M2 | HEIGHT: 60 IN | TEMPERATURE: 96.6 F | WEIGHT: 128 LBS | RESPIRATION RATE: 14 BRPM | HEART RATE: 58 BPM | OXYGEN SATURATION: 95 % | DIASTOLIC BLOOD PRESSURE: 60 MMHG | SYSTOLIC BLOOD PRESSURE: 118 MMHG

## 2024-02-01 DIAGNOSIS — E03.8 OTHER SPECIFIED HYPOTHYROIDISM: ICD-10-CM

## 2024-02-01 DIAGNOSIS — F51.01 PRIMARY INSOMNIA: ICD-10-CM

## 2024-02-01 DIAGNOSIS — R41.3 MEMORY CHANGES: ICD-10-CM

## 2024-02-01 DIAGNOSIS — G31.84 MILD COGNITIVE IMPAIRMENT: ICD-10-CM

## 2024-02-01 PROBLEM — D70.8 OTHER NEUTROPENIA (HCC): Status: RESOLVED | Noted: 2018-08-07 | Resolved: 2024-02-01

## 2024-02-01 PROCEDURE — 99214 OFFICE O/P EST MOD 30 MIN: CPT | Performed by: FAMILY MEDICINE

## 2024-02-01 PROCEDURE — 3074F SYST BP LT 130 MM HG: CPT | Performed by: FAMILY MEDICINE

## 2024-02-01 PROCEDURE — 3078F DIAST BP <80 MM HG: CPT | Performed by: FAMILY MEDICINE

## 2024-02-01 RX ORDER — LEVOTHYROXINE SODIUM 0.05 MG/1
50 TABLET ORAL
Qty: 90 TABLET | Refills: 3 | Status: SHIPPED | OUTPATIENT
Start: 2024-02-01

## 2024-02-01 RX ORDER — DONEPEZIL HYDROCHLORIDE 10 MG/1
10 TABLET, FILM COATED ORAL DAILY
Qty: 90 TABLET | Refills: 3 | Status: SHIPPED | OUTPATIENT
Start: 2024-02-01

## 2024-02-01 RX ORDER — TRAZODONE HYDROCHLORIDE 50 MG/1
50 TABLET ORAL
Qty: 90 TABLET | Refills: 3 | Status: SHIPPED | OUTPATIENT
Start: 2024-02-01

## 2024-02-01 ASSESSMENT — FIBROSIS 4 INDEX: FIB4 SCORE: 2.69

## 2024-02-01 ASSESSMENT — PATIENT HEALTH QUESTIONNAIRE - PHQ9: CLINICAL INTERPRETATION OF PHQ2 SCORE: 0

## 2024-02-01 NOTE — PROGRESS NOTES
CHIEF COMPLAINT / REASON FOR VISIT  Cristin Elizalde is a 83 y.o. female that presents today for   Chief Complaint   Patient presents with    Follow-Up     HISTORY OF PRESENT ILLNESS  No new concerns, needs med refill    OBJECTIVE     /60 (BP Location: Right arm, Patient Position: Sitting, BP Cuff Size: Adult)   Pulse (!) 58   Temp 35.9 °C (96.6 °F) (Temporal)   Resp 14   Ht 1.524 m (5')   Wt 58.1 kg (128 lb)   SpO2 95%   BMI 25.00 kg/m²      PHYSICAL EXAM  Constitutional: Sitting comfortably, in no acute distress, responds to questions appropriately.  Skin: Warm and dry, no rashes or lesions on face or exposed upper extremities    ASSESSMENT & PLAN  1. Mild cognitive impairment  2. 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 Currently on donepezil 10 mg daily.  Follows with neurology for management   - donepezil (ARICEPT) 10 MG tablet; Take 1 Tablet by mouth every day.  Dispense: 90 Tablet; Refill: 3    3. Primary insomnia  Chronic, stable, controlled with trazodone 50 mg nightly, continue current therapy  - traZODone (DESYREL) 50 MG Tab; Take 1 Tablet by mouth at bedtime.  Dispense: 90 Tablet; Refill: 3    4. Other specified hypothyroidism  Chronic, stable, controlled with levothyroxine 50 mcg daily, continue current therapy  - levothyroxine (SYNTHROID) 50 MCG Tab; Take 1 Tablet by mouth every morning on an empty stomach.  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 Continue with current defined treatment plan: Donepezil 10 mg daily. Follow-up at least annually.  Last edited 02/01/24 15:06 PST by Julio Montgomery M.D.       Follow-up every 6 months for routine visit

## 2024-03-26 ENCOUNTER — APPOINTMENT (RX ONLY)
Dept: URBAN - METROPOLITAN AREA CLINIC 4 | Facility: CLINIC | Age: 84
Setting detail: DERMATOLOGY
End: 2024-03-26

## 2024-03-26 DIAGNOSIS — R21 RASH AND OTHER NONSPECIFIC SKIN ERUPTION: ICD-10-CM | Status: INADEQUATELY CONTROLLED

## 2024-03-26 PROCEDURE — ? COUNSELING

## 2024-03-26 PROCEDURE — 99213 OFFICE O/P EST LOW 20 MIN: CPT

## 2024-03-26 PROCEDURE — ? PRESCRIPTION

## 2024-03-26 PROCEDURE — ? PHOTO-DOCUMENTATION

## 2024-03-26 RX ORDER — CLOBETASOL PROPIONATE 0.5 MG/G
CREAM TOPICAL BID
Qty: 60 | Refills: 0 | Status: ERX | COMMUNITY
Start: 2024-03-26

## 2024-03-26 RX ORDER — HYDROXYZINE HYDROCHLORIDE 10 MG/1
TABLET, FILM COATED ORAL
Qty: 60 | Refills: 0 | Status: ERX | COMMUNITY
Start: 2024-03-26

## 2024-03-26 RX ADMIN — HYDROXYZINE HYDROCHLORIDE: 10 TABLET, FILM COATED ORAL at 00:00

## 2024-03-26 RX ADMIN — CLOBETASOL PROPIONATE: 0.5 CREAM TOPICAL at 00:00

## 2024-03-26 ASSESSMENT — LOCATION SIMPLE DESCRIPTION DERM
LOCATION SIMPLE: RIGHT FOREARM
LOCATION SIMPLE: LEFT FOREARM

## 2024-03-26 ASSESSMENT — BSA RASH: BSA RASH: 3

## 2024-03-26 ASSESSMENT — LOCATION DETAILED DESCRIPTION DERM
LOCATION DETAILED: LEFT PROXIMAL DORSAL FOREARM
LOCATION DETAILED: RIGHT PROXIMAL DORSAL FOREARM

## 2024-03-26 ASSESSMENT — LOCATION ZONE DERM: LOCATION ZONE: ARM

## 2024-03-26 NOTE — HPI: RASH
What Type Of Note Output Would You Prefer (Optional)?: Standard Output
How Severe Is Your Rash?: mild
Is This A New Presentation, Or A Follow-Up?: Rash
Additional History: Patient has been using triamcinolone but saw no real improvement.

## 2024-04-10 ENCOUNTER — APPOINTMENT (RX ONLY)
Dept: URBAN - METROPOLITAN AREA CLINIC 4 | Facility: CLINIC | Age: 84
Setting detail: DERMATOLOGY
End: 2024-04-10

## 2024-04-10 DIAGNOSIS — L57.0 ACTINIC KERATOSIS: ICD-10-CM

## 2024-04-10 DIAGNOSIS — L56.5 DISSEMINATED SUPERFICIAL ACTINIC POROKERATOSIS (DSAP): ICD-10-CM | Status: INADEQUATELY CONTROLLED

## 2024-04-10 PROCEDURE — ? LIQUID NITROGEN

## 2024-04-10 PROCEDURE — ? COUNSELING

## 2024-04-10 PROCEDURE — 99213 OFFICE O/P EST LOW 20 MIN: CPT | Mod: 25

## 2024-04-10 PROCEDURE — 17000 DESTRUCT PREMALG LESION: CPT

## 2024-04-10 PROCEDURE — 17003 DESTRUCT PREMALG LES 2-14: CPT

## 2024-04-10 PROCEDURE — ? PRESCRIPTION

## 2024-04-10 ASSESSMENT — LOCATION SIMPLE DESCRIPTION DERM
LOCATION SIMPLE: RIGHT FOREARM
LOCATION SIMPLE: LEFT FOREARM

## 2024-04-10 ASSESSMENT — LOCATION DETAILED DESCRIPTION DERM
LOCATION DETAILED: RIGHT DISTAL RADIAL DORSAL FOREARM
LOCATION DETAILED: RIGHT PROXIMAL DORSAL FOREARM
LOCATION DETAILED: LEFT DISTAL DORSAL FOREARM
LOCATION DETAILED: LEFT PROXIMAL DORSAL FOREARM

## 2024-04-10 ASSESSMENT — LOCATION ZONE DERM: LOCATION ZONE: ARM

## 2024-04-10 NOTE — PROCEDURE: LIQUID NITROGEN
Post-Care Instructions: I reviewed with the patient in detail post-care instructions. Patient is to wear sunprotection, and avoid picking at any of the treated lesions. Pt may apply Vaseline to crusted or scabbing areas.
Application Tool (Optional): Liquid Nitrogen Sprayer
Number Of Freeze-Thaw Cycles: 1 freeze-thaw cycle
Render Post-Care Instructions In Note?: no
Show Aperture Variable?: Yes
Consent: The patient's consent was obtained including but not limited to risks of crusting, scabbing, blistering, scarring, darker or lighter pigmentary change, recurrence, incomplete removal and infection.
Detail Level: Detailed
Duration Of Freeze Thaw-Cycle (Seconds): 3

## 2024-05-06 ENCOUNTER — DOCUMENTATION (OUTPATIENT)
Dept: HEALTH INFORMATION MANAGEMENT | Facility: OTHER | Age: 84
End: 2024-05-06
Payer: MEDICARE

## 2024-05-23 ENCOUNTER — OFFICE VISIT (OUTPATIENT)
Dept: MEDICAL GROUP | Facility: PHYSICIAN GROUP | Age: 84
End: 2024-05-23
Payer: MEDICARE

## 2024-05-23 VITALS
SYSTOLIC BLOOD PRESSURE: 116 MMHG | WEIGHT: 129 LBS | BODY MASS INDEX: 25.32 KG/M2 | DIASTOLIC BLOOD PRESSURE: 60 MMHG | TEMPERATURE: 99.8 F | RESPIRATION RATE: 12 BRPM | OXYGEN SATURATION: 99 % | HEIGHT: 60 IN | HEART RATE: 66 BPM

## 2024-05-23 DIAGNOSIS — E03.8 OTHER SPECIFIED HYPOTHYROIDISM: ICD-10-CM

## 2024-05-23 DIAGNOSIS — G31.84 MILD COGNITIVE IMPAIRMENT: ICD-10-CM

## 2024-05-23 DIAGNOSIS — M54.50 ACUTE BILATERAL LOW BACK PAIN WITHOUT SCIATICA: ICD-10-CM

## 2024-05-23 DIAGNOSIS — J30.2 SEASONAL ALLERGIC RHINITIS, UNSPECIFIED TRIGGER: ICD-10-CM

## 2024-05-23 DIAGNOSIS — F51.01 PRIMARY INSOMNIA: ICD-10-CM

## 2024-05-23 DIAGNOSIS — R41.3 MEMORY CHANGES: ICD-10-CM

## 2024-05-23 PROCEDURE — 99214 OFFICE O/P EST MOD 30 MIN: CPT | Performed by: FAMILY MEDICINE

## 2024-05-23 PROCEDURE — 3074F SYST BP LT 130 MM HG: CPT | Performed by: FAMILY MEDICINE

## 2024-05-23 PROCEDURE — 3078F DIAST BP <80 MM HG: CPT | Performed by: FAMILY MEDICINE

## 2024-05-23 RX ORDER — TRAZODONE HYDROCHLORIDE 50 MG/1
50 TABLET ORAL
Qty: 90 TABLET | Refills: 3 | Status: SHIPPED | OUTPATIENT
Start: 2024-05-23 | End: 2024-05-28 | Stop reason: SDUPTHER

## 2024-05-23 RX ORDER — LEVOTHYROXINE SODIUM 0.05 MG/1
50 TABLET ORAL
Qty: 90 TABLET | Refills: 3 | Status: SHIPPED | OUTPATIENT
Start: 2024-05-23 | End: 2024-05-28 | Stop reason: SDUPTHER

## 2024-05-23 RX ORDER — DONEPEZIL HYDROCHLORIDE 10 MG/1
10 TABLET, FILM COATED ORAL DAILY
Qty: 90 TABLET | Refills: 3 | Status: SHIPPED | OUTPATIENT
Start: 2024-05-23 | End: 2024-05-28 | Stop reason: SDUPTHER

## 2024-05-23 ASSESSMENT — FIBROSIS 4 INDEX: FIB4 SCORE: 2.69

## 2024-05-24 NOTE — PROGRESS NOTES
CHIEF COMPLAINT / REASON FOR VISIT  Cristin Elizalde is a 83 y.o. female that presents today for   Chief Complaint   Patient presents with    Back Pain     Lower     Sinus Problem     X 2 months      HISTORY OF PRESENT ILLNESS  Verbal consent was acquired by the patient to use [employer] ambient listening note generation during this visit Yes     History of Present Illness  The patient presents for evaluation of multiple medical concerns. She is accompanied by an adult male.    The patient has been experiencing bilateral lower back pain for approximately one week, which she suspects may be due to inactivity. The pain, described as sharp, does not radiate to her legs. The pain intensifies when she picks weeds in her yard and radiates up her back when she twists. She has a history of similar back pain but has not sought any treatment for it. She denies any weakness, numbness in her legs, or any new bladder issues.    The patient's sinuses have been causing her discomfort. The onset of these symptoms was approximately one week ago. She has not been utilizing any nasal sprays, although she has used Flonase in the past. She denies any ear discomfort or high fever.    Supplemental Information  She takes trazodone for sleep.    OBJECTIVE   /60 (BP Location: Right arm, Patient Position: Sitting, BP Cuff Size: Adult)   Pulse 66   Temp 37.7 °C (99.8 °F) (Temporal)   Resp 12   Ht 1.524 m (5')   Wt 58.5 kg (129 lb)   SpO2 99%   BMI 25.19 kg/m²      PHYSICAL EXAM  Constitutional: Sitting comfortably, in no acute distress, responds to questions appropriately.  Back:  No obvious scoliosis or deformity. No tenderness to palpation of lumbar vertebrae, sacrum, paraspinal muscles. Mild pain with rotation and flexion. 5/5 strength bilateral lower extremities. Normal symmetric gait  Skin: Warm and dry, no rashes or lesions on face or exposed upper extremities    ASSESSMENT & PLAN  1. Acute bilateral low back pain without  sciatica  New bilateral lumbar paraspinal pain.  No neurologic deficits or red flag symptoms.  Suspect muscle strain/myofascial pain.  Recommend conservative treatments with lidocaine patch, Voltaren gel, physical therapy.  If worsening or developing radicular symptoms could consider pain management referral  - Referral to Physical Therapy    2. Other specified hypothyroidism  Chronic, stable, continue levothyroxine 50 mcg daily  - levothyroxine (SYNTHROID) 50 MCG Tab; Take 1 Tablet by mouth every morning on an empty stomach.  Dispense: 90 Tablet; Refill: 3    3. Memory changes  4. Mild cognitive impairment  Chronic, stable, continue donepezil 10 mg daily  - donepezil (ARICEPT) 10 MG tablet; Take 1 Tablet by mouth every day.  Dispense: 90 Tablet; Refill: 3    5. Primary insomnia  Chronic, stable, continue trazodone 50 mg nightly  - traZODone (DESYREL) 50 MG Tab; Take 1 Tablet by mouth at bedtime.  Dispense: 90 Tablet; Refill: 3    6. Seasonal allergic rhinitis, unspecified trigger  Chronic, uncontrolled.  Recommend treatment with nasal fluticasone spray 2 sprays each nostril daily and cetirizine 10 mg p.o. daily

## 2024-05-28 DIAGNOSIS — R41.3 MEMORY CHANGES: ICD-10-CM

## 2024-05-28 DIAGNOSIS — G31.84 MILD COGNITIVE IMPAIRMENT: ICD-10-CM

## 2024-05-28 DIAGNOSIS — E03.8 OTHER SPECIFIED HYPOTHYROIDISM: ICD-10-CM

## 2024-05-28 DIAGNOSIS — F51.01 PRIMARY INSOMNIA: ICD-10-CM

## 2024-05-28 NOTE — TELEPHONE ENCOUNTER
Received request via: Patient    Was the patient seen in the last year in this department? Yes    Does the patient have an active prescription (recently filled or refills available) for medication(s) requested? No    Pharm[facility] Name: [facility]    Does the patient have longterm Plus and need 100 day supply (blood pressure, diabetes and cholesterol meds only)? Patient does not have SCP

## 2024-05-29 ENCOUNTER — TELEPHONE (OUTPATIENT)
Dept: NEUROLOGY | Facility: MEDICAL CENTER | Age: 84
End: 2024-05-29
Payer: MEDICARE

## 2024-05-29 RX ORDER — TRAZODONE HYDROCHLORIDE 50 MG/1
50 TABLET ORAL
Qty: 90 TABLET | Refills: 3 | Status: SHIPPED | OUTPATIENT
Start: 2024-05-29

## 2024-05-29 RX ORDER — LEVOTHYROXINE SODIUM 0.05 MG/1
50 TABLET ORAL
Qty: 90 TABLET | Refills: 3 | Status: SHIPPED | OUTPATIENT
Start: 2024-05-29

## 2024-05-29 RX ORDER — DONEPEZIL HYDROCHLORIDE 10 MG/1
10 TABLET, FILM COATED ORAL DAILY
Qty: 90 TABLET | Refills: 3 | Status: SHIPPED | OUTPATIENT
Start: 2024-05-29

## 2024-05-29 NOTE — TELEPHONE ENCOUNTER
NEUROLOGY PATIENT PRE-VISIT PLANNING     Patient was NOT contacted to complete PVP.  Note: Patient will not be contacted if there is no indication to call.     Patient Appointment is scheduled as: Established Patient     Is visit type and length scheduled correctly? Yes    [facility]? Yes    3.   Is referral attached to visit? Yes    4. Were records received from referring provider? Yes    4. Patient was NOT contacted to have someone accompany them to visit.     5. Is this appointment scheduled as a Hospital Follow-Up?  No    6. Does the patient require any pre procedure or post procedure follow up? No    7. If any orders were placed at last visit or intended to be done for this visit do we have Results/Consult Notes? Yes  Labs - Labs ordered, completed on 01/25/24 and results are in chart.  Imaging - Imaging ordered, completed and results are in chart.  Referrals - No referrals were ordered at last office visit.  Note: If patient appointment is for lab or imaging review and patient did not complete the studies, check with provider if OK to reschedule patient until completed.    8. If patient appointment is for Botox - is order pended for provider? N/A    9. Was Plan Assessment from last Neurology Office Visit Reviewed?  Yes

## 2024-06-03 ENCOUNTER — APPOINTMENT (OUTPATIENT)
Dept: NEUROLOGY | Facility: MEDICAL CENTER | Age: 84
End: 2024-06-03
Attending: PSYCHIATRY & NEUROLOGY
Payer: MEDICARE

## 2024-06-03 VITALS
DIASTOLIC BLOOD PRESSURE: 66 MMHG | SYSTOLIC BLOOD PRESSURE: 110 MMHG | HEART RATE: 70 BPM | WEIGHT: 128.31 LBS | HEIGHT: 60 IN | BODY MASS INDEX: 25.19 KG/M2 | TEMPERATURE: 97.3 F | OXYGEN SATURATION: 96 %

## 2024-06-03 DIAGNOSIS — G31.84 AMNESTIC MCI (MILD COGNITIVE IMPAIRMENT WITH MEMORY LOSS): ICD-10-CM

## 2024-06-03 PROCEDURE — 3078F DIAST BP <80 MM HG: CPT | Performed by: PSYCHIATRY & NEUROLOGY

## 2024-06-03 PROCEDURE — 3074F SYST BP LT 130 MM HG: CPT | Performed by: PSYCHIATRY & NEUROLOGY

## 2024-06-03 PROCEDURE — 99212 OFFICE O/P EST SF 10 MIN: CPT | Performed by: PSYCHIATRY & NEUROLOGY

## 2024-06-03 PROCEDURE — 99214 OFFICE O/P EST MOD 30 MIN: CPT | Performed by: PSYCHIATRY & NEUROLOGY

## 2024-06-03 RX ORDER — DONEPEZIL HYDROCHLORIDE 10 MG/1
TABLET, FILM COATED ORAL
Qty: 90 TABLET | Refills: 6 | Status: SHIPPED | OUTPATIENT
Start: 2024-06-03 | End: 2025-05-26

## 2024-06-03 ASSESSMENT — FIBROSIS 4 INDEX: FIB4 SCORE: 2.69

## 2024-06-03 ASSESSMENT — PATIENT HEALTH QUESTIONNAIRE - PHQ9: CLINICAL INTERPRETATION OF PHQ2 SCORE: 0

## 2024-06-03 NOTE — PROGRESS NOTES
"Neuro follow visit - accompanied by her .    Reason: Amnestic Mild Cognitive Impairment.    She was also evaluated by Dr. Vikash Arboleda PhD (Neuropsychologist at [facility]):    \"Impression: The patient's cognitive profile revealed impairments in aspects of memory, with particular difficulty in memory retrieval. 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 Etiologically, her improvement in memory performance with recognition cues on most measures was indicative of executive dysfunction interfering with retrieval, consistent with what is typically seen in patients with vascular cognitive impairment. This is corroborated by her brain MRI findings of moderate chronic microvascular ischemic disease, though she does not have numerous vascular risk factors in her medical history. Her poor rote verbal memory performance, flat learning curves, and low retention rates on all memory measures remain somewhat concerning for incipient Alzheimer's disease (AD). However, AD is less likely at this time given her intact language semantic processing, story recognition memory, and visual recognition memory. Additional possible contributing factors to her cognitive difficulties include sleep problems, daytime fatigue, and current stressors. This evaluation may serve as a baseline for longitudinal tracking. \"      Cristin Lam Elizalde 83 y.o.  right handed female who is originally from the [place] area and had 1 year of college education. She was an  for many years and moved to [facility] x 20 years ([place]) and then to [facility] and/or [place] since 1984.        Cristin  has noticed for the last 3 or so years that she will infrequently go into a room and not realize or remember what she went in to get or do but \"as soon as I turn around I can usually recognize/remember what I wanted to do or " "get.\" This seems to be more frequently occurring in over the last 12 months.    She has to write down information in general in the last 12 months> mainly her appointments.     Since June 2023 and our last visit     Speech-language seems well maintained to Cristin without having lucia or consistent word retrieval issues.      has noticed that Cristin that she has more problems with the day of the week and keeping up with current events. She will ask the question and within 30-60 minutes of stating the same information and this has been happening for over 12 months.     When she drinks alcohol she will repeat sentences and this tends to be only when drinking.     No history of concussion(s), stroke or tia events, seizure type episodes known,reported (in the problem list).    She walks well without declining subjective balance nor any falls/near falls in the last 6 months.     No involuntary movements of the body or limbs in the recent months.     Denies orthostatic dizziness or lucia faintness events in the last 3 months.     No problems swallowing in the recent months.        No significant tobacco use in adult life.  Rarely drinks alcohol in adult life but like a marguirita if she goes to a [employer].      Family Hx:  1 brother (age 82)- memory decline starting about 4 years and he is starting to need help making a decision.     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    Seasonal allergies        Past surgical history:   Past Surgical History:   Procedure Laterality Date    REVISION, REVERSE TOTAL ARTHROPLASTY, SHOULDER Right 5/10/2022    Procedure: Right reverse total shoulder arthroplasty, hardware removal and repairs as indicated;  Surgeon: Boston Villegas M.D.;  Location: SURGERY [facility];  Service: Orthopedics    HARDWARE REMOVAL ORTHO Right 5/10/2022    Procedure: REMOVAL, HARDWARE;  Surgeon: Boston Villegas M.D.;  Location: SURGERY [facility];  Service: Orthopedics    SHOULDER DECOMPRESSION ARTHROSCOPIC  10/23/08    Performed by BOSTON VILLEGAS at [facility] ORS    SHOULDER ARTHROSCOPY W/ ROTATOR CUFF REPAIR  10/23/08    Performed by BOSTON VILLEGAS at [facility]    CLAVICLE DISTAL EXCISION  10/23/08    Performed by BOSTON VILLEGAS at [facility] OR[facility]    SEPTOPLASTY  2008    sinus surg    CARPAL TUNNEL REL Right 2005    BUNIONECTOMY Right 1999    BLADDER SUSPENSION  1995    surg for incontinence    TUBAL LIGATION  1970    APPENDECTOMY  1952    COLONOSCOPY  1990's         Social history:   Social History     Socioeconomic History    Marital status:      Spouse name: Not on file    Number of children: Not on file    Years of education: Not on file    Highest education level: Some college, no degree   Occupational History    Not on file   Tobacco Use    Smoking status: Never    Smokeless tobacco: Never   Vaping Use    Vaping status: Never Used   Substance and Sexual Activity    Alcohol use: Yes     Comment: Maybe once glass per month    Drug use: Never    Sexual activity: Yes     Partners: Male     Comment:    Other Topics Concern    Not on file   Social History Narrative    Not on file     Social " Determinants of Health     Financial Resource Strain: Low Risk  (7/11/2022)    Overall Financial Resource Strain (CARDIA)     Difficulty of Paying Living Expenses: Not hard at all   Food Insecurity: No Food Insecurity (7/11/2022)    Hunger Vital Sign     Worried About Running Out of Food in the Last Year: Never true     Ran Out of Food in the Last Year: Never true   Transportation Needs: No Transportation Needs (7/11/2022)    PRAPARE - Transportation     Lack of Transportation (Medical): No     Lack of Transportation (Non-Medical): No   Physical Activity: Insufficiently Active (7/11/2022)    Exercise Vital Sign     Days of Exercise per Week: 4 days     Minutes of Exercise per Session: 10 min   Stress: No Stress Concern Present (7/11/2022)    [employer] - Occupational Stress Questionnaire     Feeling of Stress : Not at all   Social Connections: Socially Integrated (7/11/2022)    Social Connection and Isolation Panel [NHANES]     Frequency of Communication with Friends and Family: Three times a week     Frequency of Social Gatherings with Friends and Family: Once a week     Attends Yarsanism Services: 1 to 4 times per year     Active Member of Clubs or Organizations: Yes     Attends Club or Organization Meetings: 1 to 4 times per year     Marital Status:    Intimate Partner Violence: Not on file   Housing Stability: Unknown (7/11/2022)    Housing Stability Vital Sign     Unable to Pay for Housing in the Last Year: No     Number of Places Lived in the Last Year: Not on file     Unstable Housing in the Last Year: No       Family history:   Family History   Problem Relation Age of Onset    Other Father 69        aneurysm    Diabetes Mother          Current medications:   Current Outpatient Medications   Medication    levothyroxine (SYNTHROID) 50 MCG Tab    traZODone (DESYREL) 50 MG Tab    fluticasone (FLONASE) 50 MCG/ACT nasal spray    vitamin D (CHOLECALCIFEROL) 1000 UNIT Tab     "MULTIVITAMINS PO    donepezil (ARICEPT) 10 MG tablet     No current facility-administered medications for this visit.       Medication Allergy:  Allergies   Allergen Reactions    Ibuprofen Anaphylaxis    Boniva [Ibandronic Acid] Unspecified     Had jaw necrosis.  Also had same reaction on Fosamax    Clindamycin Hives    Fosamax      Jaw necrosis. Had same reaction with Boniva.    Penicillins Hives           Physical examination:   Vitals:    06/03/24 1131   BP: 110/66   BP Location: Left arm   Patient Position: Sitting   BP Cuff Size: Adult   Pulse: 70   Temp: 36.3 °C (97.3 °F)   SpO2: 96%   Weight: 58.2 kg (128 lb 4.9 oz)   Height: 1.524 m (5')       Normal cephalic atraumatic.  There is full range of movement around the neck in all directions without restrictions or discrete pain evoked triggers.  No lower extremity edema.      Neurological  Exam:      Sanford Cognitive Assessment (MOCA) Version 7.1    Years of Education: 2 years of J.C.    TOTAL SCORE: 24/30  (to be scanned into the MEDIA section in the E.M.R.)    She thought she was 69 years old and then her second guess was \"62\".      Mental status: Awake, alert and fully oriented to person, place, time, and situation. Normal attention and concentration.  Did not appear/act combative,irritable,anxious,paranoid/delusional or aggressive to or with me.    Speech and language: Speech is fluent without errors, clear, intact to repetition, and intact to naming.     Follows 3 step motor commands in sequence without significant delay and correctly.    Cranial nerve exam:  II: Pupils are equally round and reactive to light. Visual fields are intact by confrontation.  III, IV, VI: EOMI, no diplopia, no ptosis.  V: Sensation to light touch is normal over V1-3 distributions bilaterally.  .  VII: Facial movements are symmetrical. There is no facial droop. .  VIII: Hearing intact to soft speech and finger rub bilaterally  IX: Palate elevates symmetrically, uvula is midline. " Dysarthria is not present.  XI: Shoulder shrug are symmetrical and strong.   XII: Tongue protrudes midline.      Motor exam:  Muscle tone is normal in all 4 limbs.    Muscle strength:    Neck Flexors/Extensors: 5/5       Right  Left  Deltoid   5/5  5/5      Biceps   5/5  5/5  Triceps              5/5  5/5   Wrist extensors 5/5  5/5  Wrist flexors  5/5  5/5     5/5  5/5  Interossei  5/5  5/5  Thenar (APB)  5/5  5/5   Hip flexors  5/5  5/5  Quadriceps  5/5  5/5    Hamstrings  5/5  5/5  Dorsiflexors  5/5  5/5  Plantarflexors  5/5  5/5  Toe extension  5/5  5/5        Reflexes:       Right  Left  Biceps   2/4  2/4  Triceps              2/4  2/4  Brachioradialis 2/4  2/4  Knee jerk  2/4  2/4  Ankle jerk  2/4  2/4     Frontal release signs are absent    bilaterally toes are downgoing to plantar stimulation..    Coordination (finger-to-nose, heel/knee/shin, rapid alternating movements) was normal.     There was no ataxia, no tremors, and no dysmetria.     Station and gait > easily stands up from exam chair without retropulsion,veering,leaning,swaying (to either side).       Labs and Tests:      1 Patient Communication            Component  Ref Range & Units 4 mo ago  (1/25/24) 1 yr ago  (2/17/23) 1 yr ago  (2/17/23) 2 yr ago  (4/25/22) 2 yr ago  (6/16/21) 3 yr ago  (3/18/21) 3 yr ago  (8/14/20)   Sodium  135 - 145 mmol/L 144  142 139 141 136 142   Potassium  3.6 - 5.5 mmol/L 4.3  4.9 4.2 4.3 4.0 4.4   Chloride  96 - 112 mmol/L 107  107 102 104 102 103   Co2  20 - 33 mmol/L 23  29 29 28 27 27   Anion Gap  7.0 - 16.0 14.0  6.0 Low  8.0 9.0 7.0 12.0   Glucose  65 - 99 mg/dL 70  86 82 84 79 93   Bun  8 - 22 mg/dL 15  15 13 13 12 12   Creatinine  0.50 - 1.40 mg/dL 0.83  0.86 0.71 0.72 0.79 0.65   Calcium  8.5 - 10.5 mg/dL 9.9  10.1 9.8 R 9.8 10.4 10.0   Correct Calcium  8.5 - 10.5 mg/dL 9.7 9.9        AST(SGOT)  12 - 45 U/L 23  23  25 24 24   ALT(SGPT)  2 - 50 U/L 8  10  14 13 17   Alkaline Phosphatase  30 - 99 U/L 93  73   50 53 64   Total Bilirubin  0.1 - 1.5 mg/dL 0.4  0.3  0.7 0.4 0.6   Albumin  3.2 - 4.9 g/dL 4.2  4.2  4.2 4.3 4.5   Total Protein  6.0 - 8.2 g/dL 7.5  7.0  6.8 6.7 6.7   Globulin  1.9 - 3.5 g/dL 3.3  2.8  2.6 2.4 2.2   A-G Ratio  g/dL 1.3  1.5  1.6 1.8 2.0          Component  Ref Range & Units 4 mo ago   Cholesterol,Tot  100 - 199 mg/dL 183   Triglycerides  0 - 149 mg/dL 106   HDL  >=40 mg/dL 66   LDL  <100 mg/dL 96   Resulting Agency M              Narrative  Performed by: M  Request patient fasting?->Yes  Fasting Instructions:->Fast 8-10 hours, OK to drink water as  needed during fast, take medications per your provider's  instruction.          TSH WITH REFLEX TO FT4  Order: [id number]   Status: Final result       Visible to patient: Yes (not seen)       Next appt: None       Dx: Preventative health care; Mild cognit...    1 Result Note       1 Patient Communication            Component  Ref Range & Units 4 mo ago  (1/25/24) 11 mo ago  (6/22/23) 11 mo ago  (6/22/23) 1 yr ago  (2/17/23) 2 yr ago  (6/16/21) 3 yr ago  (3/18/21) 3 yr ago  (8/14/20)   TSH  0.380 - 5.330 uIU/mL 5.310 3.510 CM 3.570 CM 8.250 High  CM 2.040 CM 0.740 CM 3.230 CM   Comment: The 2011 American Thyroid Association (GABRIEL) guidelines  recommended that the interpretation of thyroid function in  pregnancy be based on trimester specific reference ranges.    1st Trimester  0.100-2.500 mIU/L  2nd Trimester  0.200-3.000 mIU/L  3rd Trimester  0.300-3.500 mIU/L    These established reference ranges have not been validated              Component  Ref Range & Units 11 mo ago   Vitamin B1  70 - 180 nmol/L 138   Comment: INTERPRETIVE INFORMATION: Vitamin B1, Whole Blood  This assay measures the concentration of thiamine diphosphate  (TDP), the primary active form of vitamin B1. Approximately 90  percent of vitamin B1 present in whole blood is TDP. Thiamine and  thiamine monophosphate, which comprise the remaining 10 percent,  are not measured.  This test was  "developed and its performance characteristics  determined by [facility]. It has not been cleared or  approved by the US Food and Drug Administration. This test was  performed in a CLIA certified laboratory and is intended for  clinical purposes.  Performed By: [facility]  [address] [place] [id number]  : Fabricio Boo MD, PhD          Fabricio Morales M.D. on 6/26/2023  6:33 PM   Farbicio Morales M.D. on 6/23/2023  6:48 AM     [facility]  Order: [id number]   Status: Final result       Visible to patient: Yes (not seen)       Next appt: None    0 Result Notes       1 Patient Communication      Component  Ref Range & Units 11 mo ago   Folate -Folic Acid  >4.0 ng/mL >40.0   Comment: Results obtained by dilution.   Resulting Agency M              Specimen Collected: 06/22/23  4:12 PM Last Resulted: 06/22/23 10:10 PM          0 Result Notes       1 Patient Communication       Component  Ref Range & Units 11 mo ago 2 yr ago   Vitamin B12 -True Cobalamin  211 - 911 pg/mL >4000 High  1264 High    Comment: Results obtained by dilution.   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     Results from Dr. Arboleda ([facility] Neuropsychologist) as below: per 2023 evaluation.     \"Impression: The patient's cognitive profile revealed impairments in aspects of memory, with particular difficulty in memory retrieval. 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 Etiologically, her improvement in memory performance with recognition cues on most measures was indicative of executive dysfunction interfering with retrieval, consistent with what is typically seen in patients with vascular cognitive impairment. This is corroborated by her brain MRI findings of moderate chronic microvascular ischemic disease, though " "she does not have numerous vascular risk factors in her medical history. Her poor rote verbal memory performance, flat learning curves, and low retention rates on all memory measures remain somewhat concerning for incipient Alzheimer's disease (AD). However, AD is less likely at this time given her intact language semantic processing, story recognition memory, and visual recognition memory. Additional possible contributing factors to her cognitive difficulties include sleep problems, daytime fatigue, and current stressors. This evaluation may serve as a baseline for longitudinal tracking.  \"        MOCA score of 24/30  today > see media section.  ( She clearly difficulties with short term memory impairments)     Functional Activity Questionnaire > 8  per  (previously was in the 10-11 range). He only gave her One (2).     There are no signs of parkinsonism at this time.     There are no features of psychosis at this time.        I have performed  a history and physical exam and a directed /focused  ROS today.     Plans:     A. Brain Health topics reviewed today including some books to read about this topic.     B.  Anti amyloid IV medications reviewed ( ie, Leqembi)  and decision as not to pursue such mediation(s).       C. We will hold off on Brain PET imaging at this time after discussing this test with Cristin and .     D.  We discussed Aricept/cognitive enhancers and use of it /them which she has been prescribed by her primary care doctor. We discussed the pros and cons of restarting Aricept  this medication and if no benefit noticed by  within 6 months will stop it.    Restart 5 mg a day QPM x 1 month then 10 mg a day with checking heart rate daily for 1st 7 days and then again for 7 days when 10 mg a day dose. Stop medication if side effects such as GI upset,abdominal pain, dizziness,faintness OR heart rate under 50 Beats per minute). These issues were reviewed with Cristin and  today " and I gave her a handout on Aricept and it's side effect profile.     Total time spent today or this patient's care was 30   minutes  and included reviewing  the diagnostic workup to date (such as labs and imaging as well as interpreting such tests relevant to this patient's neurological condition),  reviewing/obtaining separately obtained history (from patient and/or accompanying ffamily member)  for today's neurological problem(s) ,counseling and educating the patient and family member on issues related to cognition/memory and cognitive health factors and documenting  the clinical information in the EMR.     Follow up in about 6 months or so.     Fabricio Morales MD  [facility].   [facility]

## 2024-06-18 ENCOUNTER — OFFICE VISIT (OUTPATIENT)
Dept: URGENT CARE | Facility: PHYSICIAN GROUP | Age: 84
End: 2024-06-18
Payer: MEDICARE

## 2024-06-18 VITALS
BODY MASS INDEX: 25.01 KG/M2 | SYSTOLIC BLOOD PRESSURE: 102 MMHG | HEIGHT: 60 IN | RESPIRATION RATE: 16 BRPM | TEMPERATURE: 97 F | DIASTOLIC BLOOD PRESSURE: 62 MMHG | OXYGEN SATURATION: 93 % | WEIGHT: 127.4 LBS | HEART RATE: 61 BPM

## 2024-06-18 DIAGNOSIS — J06.9 VIRAL URI WITH COUGH: ICD-10-CM

## 2024-06-18 DIAGNOSIS — R05.1 ACUTE COUGH: ICD-10-CM

## 2024-06-18 LAB
FLUAV RNA SPEC QL NAA+PROBE: NEGATIVE
FLUBV RNA SPEC QL NAA+PROBE: NEGATIVE
RSV RNA SPEC QL NAA+PROBE: NEGATIVE
S PYO DNA SPEC NAA+PROBE: NOT DETECTED
SARS-COV-2 RNA RESP QL NAA+PROBE: NEGATIVE

## 2024-06-18 PROCEDURE — 0241U POCT CEPHEID COV-2, FLU A/B, RSV - PCR: CPT

## 2024-06-18 PROCEDURE — 87651 STREP A DNA AMP PROBE: CPT

## 2024-06-18 PROCEDURE — 99213 OFFICE O/P EST LOW 20 MIN: CPT

## 2024-06-18 ASSESSMENT — FIBROSIS 4 INDEX: FIB4 SCORE: 2.69

## 2024-06-18 NOTE — PROGRESS NOTES
"Subjective     Cristin Elizalde is a 83 y.o. female who presents with sore throat and cough x5 days.     HPI:   Cristin is a 82yo female presenting for sore throat and cough x5 days. Reports associated looser than normal stools. Denies abdominal pain, vomiting, or diarrhea. No fever. Denies shortness of breath or increased work of breathing. No facial or neck pain or swelling. Denies dysphagia or difficulty managing oral secretions. No vision changes or headache. She has attempted zantac for relief.       Review of Systems   Constitutional:  Negative for chills, fever and malaise/fatigue.   HENT:  Positive for congestion and sore throat. Negative for ear discharge, ear pain and sinus pain.    Eyes:  Negative for blurred vision, double vision, photophobia, pain, discharge and redness.   Respiratory:  Positive for cough. Negative for sputum production, shortness of breath, wheezing and stridor.    Cardiovascular:  Negative for chest pain, palpitations and leg swelling.   Gastrointestinal:  Negative for abdominal pain, diarrhea, nausea and vomiting.   Genitourinary:  Negative for dysuria.   Musculoskeletal:  Negative for back pain, myalgias and neck pain.   Neurological:  Negative for dizziness, tingling, sensory change, speech change, focal weakness, weakness and headaches.     Past Medical History:   Diagnosis Date    Arthritis     Osteo    ASTHMA     rare use of inhaler    Hypothyroidism (acquired)     Insomnia     Memory loss     \"a little bit\"     Osteoporosis     Other neutropenia (HCC) 08/07/2018    Pain     Right shoulder pain 04/25/2022    constant dull ache, and positional pain.    Seasonal allergies      Past Surgical History:   Procedure Laterality Date    REVISION, REVERSE TOTAL ARTHROPLASTY, SHOULDER Right 5/10/2022    Procedure: Right reverse total shoulder arthroplasty, hardware removal and repairs as indicated;  Surgeon: Boston Bower M.D.;  Location: SURGERY [facility];  Service: Orthopedics    HARDWARE " REMOVAL ORTHO Right 5/10/2022    Procedure: REMOVAL, HARDWARE;  Surgeon: Boston Villegas M.D.;  Location: [facility];  Service: Orthopedics    SHOULDER DECOMPRESSION ARTHROSCOPIC  10/23/08    Performed by BOSTON VILLEGAS at [facility]    SHOULDER ARTHROSCOPY W/ ROTATOR CUFF REPAIR  10/23/08    Performed by BOSTON VILLEGAS at [facility] ORS    CLAVICLE DISTAL EXCISION  10/23/08    Performed by BOSTON VILLEGAS at [facility] ORS    SEPTOPLASTY  2008    sinus surg    CARPAL TUNNEL REL Right 2005    BUNIONECTOMY Right 1999    BLADDER SUSPENSION  1995    surg for incontinence    TUBAL LIGATION  1970    APPENDECTOMY  1952    COLONOSCOPY  1990's      Allergies: Ibuprofen, Boniva [ibandronic acid], Clindamycin, Fosamax, and Penicillins     Current Outpatient Medications:     donepezil (ARICEPT) 10 MG tablet, Take 1/2 tablet orally before bedtime for 1 month and a 1 tablet a day, Disp: 90 Tablet, Rfl: 6    levothyroxine (SYNTHROID) 50 MCG Tab, Take 1 Tablet by mouth every morning on an empty stomach., Disp: 90 Tablet, Rfl: 3    donepezil (ARICEPT) 10 MG tablet, Take 1 Tablet by mouth every day., Disp: 90 Tablet, Rfl: 3    traZODone (DESYREL) 50 MG Tab, Take 1 Tablet by mouth at bedtime., Disp: 90 Tablet, Rfl: 3    fluticasone (FLONASE) 50 MCG/ACT nasal spray, Administer 1 Spray into affected nostril(S) every day., Disp: 16 g, Rfl: 1    vitamin D (CHOLECALCIFEROL) 1000 UNIT Tab, Take 1,000 Units by mouth every day., Disp: , Rfl:     MULTIVITAMINS PO, Take 1 Tablet by mouth every day., Disp: , Rfl:      Social History     Tobacco Use    Smoking status: Never    Smokeless tobacco: Never   Vaping Use    Vaping status: Never Used   Substance Use Topics    Alcohol use: Yes     Comment: Maybe once glass per month    Drug use: Never      Family History   Problem Relation Age of Onset    Other Father 69        aneurysm    Diabetes Mother       Medications, Allergies, and current problem list reviewed  today in Epic.      Objective     /62 (BP Location: Right arm, Patient Position: Sitting, BP Cuff Size: Adult)   Pulse 61   Temp 36.1 °C (97 °F) (Temporal)   Resp 16   Ht 1.524 m (5')   Wt 57.8 kg (127 lb 6.4 oz)   SpO2 93%   BMI 24.88 kg/m²      Physical Exam  Vitals reviewed.   Constitutional:       General: She is not in acute distress.  HENT:      Right Ear: Tympanic membrane, ear canal and external ear normal.      Left Ear: Tympanic membrane, ear canal and external ear normal.      Nose: Congestion present.      Mouth/Throat:      Mouth: Mucous membranes are moist.      Pharynx: Uvula midline. Posterior oropharyngeal erythema present. No oropharyngeal exudate or uvula swelling.   Eyes:      General: Vision grossly intact. Gaze aligned appropriately. No visual field deficit.     Extraocular Movements: Extraocular movements intact.      Conjunctiva/sclera: Conjunctivae normal.      Pupils: Pupils are equal, round, and reactive to light.   Cardiovascular:      Rate and Rhythm: Normal rate and regular rhythm.      Pulses: Normal pulses.      Heart sounds: Normal heart sounds.   Pulmonary:      Effort: Pulmonary effort is normal. No tachypnea, accessory muscle usage, prolonged expiration, respiratory distress or retractions.      Breath sounds: Normal breath sounds. No stridor, decreased air movement or transmitted upper airway sounds. No wheezing, rhonchi or rales.   Musculoskeletal:      Cervical back: Full passive range of motion without pain, normal range of motion and neck supple. No rigidity. Normal range of motion.      Right lower leg: No edema.      Left lower leg: No edema.   Skin:     General: Skin is warm and dry.   Neurological:      General: No focal deficit present.      Mental Status: She is alert. Mental status is at baseline.      Cranial Nerves: Cranial nerves 2-12 are intact.      Sensory: Sensation is intact.      Motor: Motor function is intact.      Coordination: Coordination is  intact.      Gait: Gait is intact.   Psychiatric:         Mood and Affect: Mood normal.         Behavior: Behavior normal.         Thought Content: Thought content normal.       Results for orders placed or performed in visit on 06/18/24   POCT CEPHEID GROUP A STREP - PCR   Result Value Ref Range    POC Group A Strep, PCR Not Detected Not Detected, Invalid   POCT CoV-2, Flu A/B, RSV by PCR   Result Value Ref Range    SARS-CoV-2 by PCR Negative Negative, Invalid    Influenza virus A RNA Negative Negative, Invalid    Influenza virus B, PCR Negative Negative, Invalid    RSV, PCR Negative Negative, Invalid     Assessment & Plan     1. Acute cough   - POCT CEPHEID GROUP A STREP - PCR  - POCT CoV-2, Flu A/B, RSV by PCR    2. Viral URI with cough  - Supportive measures encouraged       MDM/Comments:   History and examination most consistent with viral URI  No signs of bacterial infection on exam   Lung sounds present and clear throughout all lung fields   - POCT Influenza A/B/RSV- negative  - POCT Group A Strep- negative   Encouraged conservative treatment.     Advised patient symptoms are viral in etiology, recommended supportive care. Increased fluids and rest. Discussed use of nedi-pot, humidifier, and Flonase nasal spray.  Discussed good hand hygiene and ways to decrease spread of disease.  Follow-up with PCP return for reevaluation if symptoms persist/worsen. The patient demonstrated a good understanding and agreed with the treatment plan.        Illness progression and alarm symptoms discussed with patient, emphasizing low threshold for returning to clinic/emergency department for worsening symptoms. Patient is agreeable to the plan and verbalizes understanding, and will follow up if warranted.        Differential diagnosis, supportive care, and indications for immediate follow-up discussed with patient. Instructed to return to clinic or nearest emergency department for any change in condition, further concerns, or  worsening of symptoms.     Recommended supportive treatment at home:     - Use a humidifier, especially at night. Cold or warm water humidifiers have the same effect.  - Priyank Med squeeze bottle sinus rinses or plain nasal saline twice a day.  - Hot tea + honey + fresh lemon juice  - Frequent hand washing, rest, hydration  - Warm salt water gargles at least twice a day       Follow up with primary care provider. Follow up urgently for worsening symptoms, persistent fevers, facial swelling, visual changes, weakness, elevated heart rate, stiff neck, prolonged cough, persistent wheezing, leg swelling, or any other concerns. Seek emergency medical care immediately for: Trouble breathing, persistent pain or pressure in the chest, confusion, inability to wake or stay awake, bluish lips or face, persistent tachycardia (fast heart rate), prolonged dizziness, persistent high grade fevers.                     Electronically signed by GENET Eaton

## 2024-06-19 ASSESSMENT — ENCOUNTER SYMPTOMS
EYE DISCHARGE: 0
SINUS PAIN: 0
HEADACHES: 0
PHOTOPHOBIA: 0
DOUBLE VISION: 0
SHORTNESS OF BREATH: 0
FOCAL WEAKNESS: 0
SORE THROAT: 1
BLURRED VISION: 0
ABDOMINAL PAIN: 0
SPEECH CHANGE: 0
BACK PAIN: 0
SENSORY CHANGE: 0
STRIDOR: 0
VOMITING: 0
TINGLING: 0
NAUSEA: 0
COUGH: 1
CHILLS: 0
WEAKNESS: 0
FEVER: 0
EYE PAIN: 0
MYALGIAS: 0
SPUTUM PRODUCTION: 0
DIARRHEA: 0
EYE REDNESS: 0
NECK PAIN: 0
PALPITATIONS: 0
WHEEZING: 0
DIZZINESS: 0

## 2024-06-19 ASSESSMENT — VISUAL ACUITY: OU: 1

## 2024-07-25 ENCOUNTER — HOSPITAL ENCOUNTER (EMERGENCY)
Facility: MEDICAL CENTER | Age: 84
End: 2024-07-25
Attending: EMERGENCY MEDICINE
Payer: OTHER MISCELLANEOUS

## 2024-07-25 ENCOUNTER — APPOINTMENT (OUTPATIENT)
Dept: RADIOLOGY | Facility: MEDICAL CENTER | Age: 84
End: 2024-07-25
Attending: EMERGENCY MEDICINE
Payer: OTHER MISCELLANEOUS

## 2024-07-25 VITALS
SYSTOLIC BLOOD PRESSURE: 138 MMHG | RESPIRATION RATE: 16 BRPM | TEMPERATURE: 97.5 F | OXYGEN SATURATION: 95 % | HEART RATE: 52 BPM | BODY MASS INDEX: 24.54 KG/M2 | DIASTOLIC BLOOD PRESSURE: 62 MMHG | WEIGHT: 125 LBS | HEIGHT: 60 IN

## 2024-07-25 DIAGNOSIS — S09.90XA CLOSED HEAD INJURY, INITIAL ENCOUNTER: ICD-10-CM
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
## 2024-07-25 DIAGNOSIS — S13.4XXA WHIPLASH INJURY TO NECK, INITIAL ENCOUNTER: ICD-10-CM

## 2024-07-25 PROCEDURE — 70450 CT HEAD/BRAIN W/O DYE: CPT

## 2024-07-25 PROCEDURE — 72125 CT NECK SPINE W/O DYE: CPT

## 2024-07-25 PROCEDURE — 99284 EMERGENCY DEPT VISIT MOD MDM: CPT

## 2024-07-25 ASSESSMENT — LIFESTYLE VARIABLES
CONSUMPTION TOTAL: INCOMPLETE
TOTAL SCORE: 0
EVER FELT BAD OR GUILTY ABOUT YOUR DRINKING: NO
DO YOU DRINK ALCOHOL: NO
TOTAL SCORE: 0
HAVE PEOPLE ANNOYED YOU BY CRITICIZING YOUR DRINKING: NO
TOTAL SCORE: 0
EVER HAD A DRINK FIRST THING IN THE MORNING TO STEADY YOUR NERVES TO GET RID OF A HANGOVER: NO
HAVE YOU EVER FELT YOU SHOULD CUT DOWN ON YOUR DRINKING: NO

## 2024-07-25 ASSESSMENT — PAIN DESCRIPTION - PAIN TYPE: TYPE: ACUTE PAIN

## 2024-07-25 ASSESSMENT — FIBROSIS 4 INDEX: FIB4 SCORE: 2.72

## 2024-09-09 ENCOUNTER — APPOINTMENT (RX ONLY)
Dept: URBAN - METROPOLITAN AREA CLINIC 4 | Facility: CLINIC | Age: 84
Setting detail: DERMATOLOGY
End: 2024-09-09

## 2024-09-09 DIAGNOSIS — D18.0 HEMANGIOMA: ICD-10-CM

## 2024-09-09 DIAGNOSIS — L81.4 OTHER MELANIN HYPERPIGMENTATION: ICD-10-CM

## 2024-09-09 DIAGNOSIS — L82.1 OTHER SEBORRHEIC KERATOSIS: ICD-10-CM

## 2024-09-09 DIAGNOSIS — L57.8 OTHER SKIN CHANGES DUE TO CHRONIC EXPOSURE TO NONIONIZING RADIATION: ICD-10-CM

## 2024-09-09 DIAGNOSIS — D22 MELANOCYTIC NEVI: ICD-10-CM

## 2024-09-09 DIAGNOSIS — L30.9 DERMATITIS, UNSPECIFIED: ICD-10-CM

## 2024-09-09 PROBLEM — D18.01 HEMANGIOMA OF SKIN AND SUBCUTANEOUS TISSUE: Status: ACTIVE | Noted: 2024-09-09

## 2024-09-09 PROBLEM — D22.5 MELANOCYTIC NEVI OF TRUNK: Status: ACTIVE | Noted: 2024-09-09

## 2024-09-09 PROCEDURE — 99213 OFFICE O/P EST LOW 20 MIN: CPT

## 2024-09-09 PROCEDURE — ? ADDITIONAL NOTES

## 2024-09-09 PROCEDURE — ? COUNSELING

## 2024-09-09 PROCEDURE — ? OBSERVATION

## 2024-09-09 ASSESSMENT — LOCATION DETAILED DESCRIPTION DERM
LOCATION DETAILED: RIGHT CENTRAL MALAR CHEEK
LOCATION DETAILED: RIGHT DISTAL DORSAL FOREARM
LOCATION DETAILED: LEFT INFERIOR CENTRAL MALAR CHEEK
LOCATION DETAILED: RIGHT INFERIOR UPPER BACK
LOCATION DETAILED: RIGHT ANTERIOR DISTAL THIGH
LOCATION DETAILED: RIGHT ANTERIOR PROXIMAL UPPER ARM
LOCATION DETAILED: LEFT MEDIAL SUPERIOR CHEST
LOCATION DETAILED: RIGHT SUPERIOR MEDIAL UPPER BACK
LOCATION DETAILED: RIGHT MID-UPPER BACK
LOCATION DETAILED: LEFT ANTERIOR DISTAL THIGH
LOCATION DETAILED: LEFT ANTERIOR PROXIMAL UPPER ARM
LOCATION DETAILED: LEFT DISTAL DORSAL FOREARM
LOCATION DETAILED: LEFT SUPERIOR LATERAL MIDBACK

## 2024-09-09 ASSESSMENT — LOCATION SIMPLE DESCRIPTION DERM
LOCATION SIMPLE: RIGHT UPPER ARM
LOCATION SIMPLE: LEFT UPPER ARM
LOCATION SIMPLE: LEFT THIGH
LOCATION SIMPLE: LEFT LOWER BACK
LOCATION SIMPLE: RIGHT FOREARM
LOCATION SIMPLE: CHEST
LOCATION SIMPLE: RIGHT UPPER BACK
LOCATION SIMPLE: LEFT FOREARM
LOCATION SIMPLE: LEFT CHEEK
LOCATION SIMPLE: RIGHT CHEEK
LOCATION SIMPLE: RIGHT THIGH

## 2024-09-09 ASSESSMENT — LOCATION ZONE DERM
LOCATION ZONE: ARM
LOCATION ZONE: TRUNK
LOCATION ZONE: FACE
LOCATION ZONE: LEG

## 2024-09-09 ASSESSMENT — BSA RASH: BSA RASH: 3

## 2024-09-09 ASSESSMENT — ITCH NUMERIC RATING SCALE: HOW SEVERE IS YOUR ITCHING?: 5

## 2024-09-09 NOTE — PROCEDURE: ADDITIONAL NOTES
Detail Level: Simple
Render Risk Assessment In Note?: no
Additional Notes: Patient has some at home she can use Bid for a few weeks

## 2024-09-18 ENCOUNTER — TELEPHONE (OUTPATIENT)
Dept: HEALTH INFORMATION MANAGEMENT | Facility: OTHER | Age: 84
End: 2024-09-18
Payer: MEDICARE

## 2024-12-02 ENCOUNTER — HOSPITAL ENCOUNTER (OUTPATIENT)
Facility: MEDICAL CENTER | Age: 84
End: 2024-12-02
Attending: UROLOGY
Payer: MEDICARE

## 2024-12-02 PROCEDURE — 87186 SC STD MICRODIL/AGAR DIL: CPT

## 2024-12-02 PROCEDURE — 87077 CULTURE AEROBIC IDENTIFY: CPT

## 2024-12-02 PROCEDURE — 87086 URINE CULTURE/COLONY COUNT: CPT

## 2024-12-04 LAB
BACTERIA UR CULT: ABNORMAL
BACTERIA UR CULT: ABNORMAL
SIGNIFICANT IND 70042: ABNORMAL
SITE SITE: ABNORMAL
SOURCE SOURCE: ABNORMAL

## 2024-12-09 ENCOUNTER — OFFICE VISIT (OUTPATIENT)
Dept: NEUROLOGY | Facility: MEDICAL CENTER | Age: 84
End: 2024-12-09
Attending: PSYCHIATRY & NEUROLOGY
Payer: MEDICARE

## 2024-12-09 VITALS
HEART RATE: 60 BPM | DIASTOLIC BLOOD PRESSURE: 60 MMHG | OXYGEN SATURATION: 98 % | SYSTOLIC BLOOD PRESSURE: 120 MMHG | BODY MASS INDEX: 25.8 KG/M2 | HEIGHT: 60 IN | TEMPERATURE: 97.9 F | RESPIRATION RATE: 16 BRPM | WEIGHT: 131.39 LBS

## 2024-12-09 DIAGNOSIS — G31.84 AMNESTIC MCI (MILD COGNITIVE IMPAIRMENT WITH MEMORY LOSS): ICD-10-CM

## 2024-12-09 PROBLEM — E03.9 ACQUIRED HYPOTHYROIDISM: Status: ACTIVE | Noted: 2024-08-19

## 2024-12-09 PROBLEM — J45.41 MODERATE PERSISTENT ASTHMA WITH ACUTE EXACERBATION: Status: ACTIVE | Noted: 2024-08-19

## 2024-12-09 PROCEDURE — 3074F SYST BP LT 130 MM HG: CPT | Performed by: PSYCHIATRY & NEUROLOGY

## 2024-12-09 PROCEDURE — 3078F DIAST BP <80 MM HG: CPT | Performed by: PSYCHIATRY & NEUROLOGY

## 2024-12-09 PROCEDURE — 99212 OFFICE O/P EST SF 10 MIN: CPT | Performed by: PSYCHIATRY & NEUROLOGY

## 2024-12-09 PROCEDURE — 99215 OFFICE O/P EST HI 40 MIN: CPT | Performed by: PSYCHIATRY & NEUROLOGY

## 2024-12-09 RX ORDER — ALBUTEROL SULFATE 90 UG/1
2 INHALANT RESPIRATORY (INHALATION) EVERY 4 HOURS PRN
COMMUNITY

## 2024-12-09 RX ORDER — TRAZODONE HYDROCHLORIDE 50 MG/1
50 TABLET, FILM COATED ORAL NIGHTLY
COMMUNITY
Start: 2024-08-19

## 2024-12-09 RX ORDER — DONEPEZIL HYDROCHLORIDE 10 MG/1
TABLET, FILM COATED ORAL
Qty: 90 TABLET | Refills: 6 | Status: SHIPPED | OUTPATIENT
Start: 2024-12-09 | End: 2025-12-01

## 2024-12-09 RX ORDER — LEVOTHYROXINE SODIUM 50 UG/1
50 TABLET ORAL
COMMUNITY
Start: 2024-08-19

## 2024-12-09 ASSESSMENT — PATIENT HEALTH QUESTIONNAIRE - PHQ9: CLINICAL INTERPRETATION OF PHQ2 SCORE: 0

## 2024-12-09 ASSESSMENT — FIBROSIS 4 INDEX: FIB4 SCORE: 2.72

## 2024-12-09 NOTE — PROGRESS NOTES
"Neuro follow visit - accompanied by her .     Reason: Amnestic Mild Cognitive Impairment.    Last visit me in June 2024.     She was also evaluated by Dr. Vikash Arboleda PhD (Neuropsychologist at [facility]):     \"Impression: The patient's cognitive profile revealed impairments in aspects of memory, with particular difficulty in memory retrieval. 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 Etiologically, her improvement in memory performance with recognition cues on most measures was indicative of executive dysfunction interfering with retrieval, consistent with what is typically seen in patients with vascular cognitive impairment. This is corroborated by her brain MRI findings of moderate chronic microvascular ischemic disease, though she does not have numerous vascular risk factors in her medical history. Her poor rote verbal memory performance, flat learning curves, and low retention rates on all memory measures remain somewhat concerning for incipient Alzheimer's disease (AD). However, AD is less likely at this time given her intact language semantic processing, story recognition memory, and visual recognition memory. Additional possible contributing factors to her cognitive difficulties include sleep problems, daytime fatigue, and current stressors. This evaluation may serve as a baseline for longitudinal tracking. \"        Cristin Elizalde 84  y.o.  right handed female who is originally from the [place] area and had 1 year of college education. She was an  for many years and moved to [facility] x 20 years ([place]) and then to [facility]/or [place] since 1984.        Cristin  has noticed for the last 3 to 4  years that she will infrequently go into a room and not realize or remember what she went in to get or do but \"as soon as I turn around I can usually " "recognize/remember what I wanted to do or get.\" This seems to be more frequently occurring in over the last 12 months.     has mainly noticed that the main change since June 2024 is that Cristin tends to forget what is said to her within 5-10 minutes of being told something. She is not however repeating herself over and over again in the last 3-6 months.    She still has  to write down information in general in the last 12 months> mainly her appointments.     Speech-language seems well maintained to Cristin without having lucia or consistent word retrieval issues.      has noticed that Cristin that she has more problems with the day of the week and keeping up with current events. She will ask the question and within 30-60 minutes of stating the same information and this has been happening for over 12 months.     When she drinks alcohol she will repeat sentences and this tends to be only when drinking.     No history of concussion(s), stroke or TIA events, seizure type episodes known,reported (in the problem list).    She walks well without declining subjective balance nor any falls/near falls in the last 6 months.     No delusions,paranoia,hallucinations, behavioral or personality changes in the last 3 months.    Sleep: averages 7-8 hours most nights of the week in the recent months. Denies REM Sleep Behavioral type symptoms such as vivid dreaming.  Rare awakenings to urinate (at most 2 times per night in the recent few months). Denies Excessive Day Time Sleepiness or need for daily or frequent napping in the recent few months.    No involuntary movements of the body or limbs in the recent months.     Denies orthostatic dizziness or lucia faintness events in the last 3 months.     There has been no problems swallowing in the recent months.      No significant tobacco use in adult life.  Rarely drinks alcohol in adult life but like a marguirita if she goes to a [employer].      Family Hx:  1 brother " "(age 82)- memory decline starting about 4 years and he is starting to need help making a decision.       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    Seasonal allergies        Past surgical history:   Past Surgical History:   Procedure Laterality Date    REVISION, REVERSE TOTAL ARTHROPLASTY, SHOULDER Right 5/10/2022    Procedure: Right reverse total shoulder arthroplasty, hardware removal and repairs as indicated;  Surgeon: Boston Villegas M.D.;  Location: [facility];  Service: Orthopedics    HARDWARE REMOVAL ORTHO Right 5/10/2022    Procedure: REMOVAL, HARDWARE;  Surgeon: Boston Villegas M.D.;  Location: [facility];  Service: Orthopedics    SHOULDER DECOMPRESSION ARTHROSCOPIC  10/23/08    Performed by BOSTON VILLEGAS at [facility] ORS    SHOULDER ARTHROSCOPY W/ ROTATOR CUFF REPAIR  10/23/08    Performed by BOSTON VILLEGAS at [facility] ORS    CLAVICLE DISTAL EXCISION  10/23/08    Performed by BOSTON VILLEGAS at [facility] ORS    SEPTOPLASTY  2008    sinus surg    CARPAL TUNNEL REL Right 2005    BUNIONECTOMY Right 1999    BLADDER SUSPENSION  1995    surg for incontinence    TUBAL LIGATION  1970    APPENDECTOMY  1952    " COLONOSCOPY  1990's         Social history:   Social History     Socioeconomic History    Marital status:      Spouse name: Not on file    Number of children: Not on file    Years of education: Not on file    Highest education level: Some college, no degree   Occupational History    Not on file   Tobacco Use    Smoking status: Never    Smokeless tobacco: Never   Vaping Use    Vaping status: Never Used   Substance and Sexual Activity    Alcohol use: Yes     Alcohol/week: 0.6 oz     Types: 1 Glasses of wine per week     Comment: Maybe once glass per month    Drug use: Never    Sexual activity: Yes     Partners: Male     Comment:    Other Topics Concern    Not on file   Social History Narrative    Not on file     Social Drivers of Health     Financial Resource Strain: Low Risk  (7/11/2022)    Overall Financial Resource Strain (CARDIA)     Difficulty of Paying Living Expenses: Not hard at all   Food Insecurity: No Food Insecurity (7/11/2022)    Hunger Vital Sign     Worried About Running Out of Food in the Last Year: Never true     Ran Out of Food in the Last Year: Never true   Transportation Needs: No Transportation Needs (7/11/2022)    PRAPARE - Transportation     Lack of Transportation (Medical): No     Lack of Transportation (Non-Medical): No   Physical Activity: Insufficiently Active (7/11/2022)    Exercise Vital Sign     Days of Exercise per Week: 4 days     Minutes of Exercise per Session: 10 min   Stress: No Stress Concern Present (7/11/2022)    [employer] [place] [employer] - Occupational Stress Questionnaire     Feeling of Stress : Not at all   Social Connections: Socially Integrated (7/11/2022)    Social Connection and Isolation Panel [NHANES]     Frequency of Communication with Friends and Family: Three times a week     Frequency of Social Gatherings with Friends and Family: Once a week     Attends Rastafari Services: 1 to 4 times per year     Active Member of Clubs or Organizations:  "Yes     Attends Club or Organization Meetings: 1 to 4 times per year     Marital Status:    Intimate Partner Violence: Not on file   Housing Stability: Unknown (7/11/2022)    Housing Stability Vital Sign     Unable to Pay for Housing in the Last Year: No     Number of Places Lived in the Last Year: Not on file     Unstable Housing in the Last Year: No       Family history:   Family History   Problem Relation Age of Onset    Other Father 69        aneurysm    Diabetes Mother          Current medications:   Current Outpatient Medications   Medication    donepezil (ARICEPT) 10 MG tablet    levothyroxine (SYNTHROID) 50 MCG Tab    donepezil (ARICEPT) 10 MG tablet    traZODone (DESYREL) 50 MG Tab    fluticasone (FLONASE) 50 MCG/ACT nasal spray    vitamin D (CHOLECALCIFEROL) 1000 UNIT Tab    MULTIVITAMINS PO     No current facility-administered medications for this visit.       Medication Allergy:  Allergies   Allergen Reactions    Ibuprofen Anaphylaxis    Boniva [Ibandronic Acid] Unspecified     Had jaw necrosis.  Also had same reaction on Fosamax    Clindamycin Hives    Fosamax      Jaw necrosis. Had same reaction with Boniva.    Penicillins Hives           Physical examination:   Vitals:    12/09/24 1114   BP: 120/60   BP Location: Left arm   Patient Position: Sitting   BP Cuff Size: Adult   Pulse: 60   Resp: 16   Temp: 36.6 °C (97.9 °F)   TempSrc: Temporal   SpO2: 98%   Weight: 59.6 kg (131 lb 6.3 oz)   Height: 1.524 m (5')       Normal cephalic atraumatic.  There is full range of movement around the neck in all directions without restrictions or discrete pain evoked triggers.  No lower extremity edema.      Neurological  Exam:      [place] Cognitive Assessment (MOCA) Version 7.1    Years of Education:  2 years of [employer] ( [place])    TOTAL SCORE: 22/30  (to be scanned into the MEDIA section in the E.M.R.)    She was not able to tell me her age- \"I believe that I am 72\".    She was able to tell " "me the name of her street but not number (she has only lived their 1 year).    2 dimes, 1 nickel and 1 johann> \"26 cents\"    President of [place]- \"Alexandro Nelson\".    New President- \"Brayan Grayson.\"        Mental status: Awake, alert and fully oriented to person, place, time, and situation (except thought it was the 7th of the month> it was the 9th). Normal attention and concentration.  Did not appear/act combative,irritable,anxious,paranoid/delusional or aggressive to or with me.    Speech and language: Speech is fluent without errors, clear, intact to repetition, and intact to naming.     Follows 3 step motor commands in sequence without significant delay and correctly.    Cranial nerve exam:  II: Pupils are equally round and reactive to light. Visual fields are intact by confrontation.  III, IV, VI: EOMI, no diplopia, no ptosis.  V: Sensation to light touch is normal over V1-3 distributions bilaterally.  .  VII: Facial movements are symmetrical. There is no facial droop. .  VIII: Hearing intact to soft speech and finger rub bilaterally  IX: Palate elevates symmetrically, uvula is midline. Dysarthria is not present.  XI: Shoulder shrug are symmetrical and strong.   XII: Tongue protrudes midline.      Motor exam:  Muscle tone is normal in all 4 limbs. and No abnormal movements appreciated.    Muscle strength:    Neck Flexors/Extensors: 5/5       Right  Left  Deltoid   5/5  5/5      Biceps   5/5  5/5  Triceps              5/5  5/5   Wrist extensors 5/5  5/5  Wrist flexors  5/5  5/5     5/5  5/5  Interossei  5/5  5/5  Thenar (APB)  5/5  5/5   Hip flexors  5/5  5/5  Quadriceps  5/5  5/5    Hamstrings  5/5  5/5  Dorsiflexors  5/5  5/5  Plantarflexors  5/5  5/5  Toe extension  5/5  5/5      Reflexes:       Right  Left  Biceps   2/4  2/4  Triceps              2/4  2/4  Brachioradialis 2/4  2/4  Knee jerk  2/4  2/4  Ankle jerk  2/4  2/4     Frontal release signs are absent    bilaterally toes are downgoing to plantar " stimulation..    Coordination (finger-to-nose, heel/knee/shin, rapid alternating movements) was normal.     There was no ataxia, no tremors, and no dysmetria.     Station and gait > easily stands up from exam chair without retropulsion,veering,leaning,swaying (to either side).         7/12/2023 5:06 PM     HISTORY/REASON FOR EXAM:  memory disturbances for over the last 2-3 years.     TECHNIQUE/EXAM DESCRIPTION:  MRI of the brain without contrast with attention to the temporal lobes.     T1 sagittal, T2 fast spin-echo axial, T1 coronal, FLAIR coronal, Diffusion weighted and Apparent Diffusion Coefficient (ADC map) axial images were obtained of the whole brain. T2 thin section oblique coronal images were obtained of the temporal lobes and   hippocampal formations. Additional FLAIR axial images were also obtained.     The study was performed on a Vubiquitya 1.5 [place] MRI scanner.     COMPARISON:  None.     FINDINGS:  The calvariae are unremarkable.  There are no extra-axial fluid collections.     There is a pattern of mild cerebral atrophy manifest as prominence of sulcal markings over the convexities and vertex along with mild ventriculomegaly. Age-appropriate. Atrophy is generalized and there is no disproportionate temporal lobe or hippocampal   atrophy.     There is a pattern of moderate supratentorial white matter disease with patchy and focal areas of bright T2 and FLAIR signal in the subcortical and deep white matter of both hemispheres consistent with small vessel ischemic change versus demyelination or   gliosis.     There are no mass effects or shift of midline structures.  There are no hemorrhagic lesions.  The diffusion weighted axial images show no evidence of acute cerebral infarction.     The brainstem and posterior fossa structures show minimal pontine ischemic gliosis.     Vascular flow voids in the vertebrobasilar and carotid arteries, [place], and dural venous sinuses are intact. There is a  "diminutive vertebro-basilar system consistent with normal variation likely associated with carotid origin of one or both   posterior cerebral arteries.     The paranasal sinuses in the field of view are unremarkable.     The mastoids are clear. There has been cataract surgery.     IMPRESSION:     1.  Mild cerebral atrophy. Age-appropriate. Atrophy is generalized and there is no disproportionate temporal lobe or hippocampal atrophy.  2.  Moderate supratentorial white matter disease most consistent with microvascular ischemic change.  3.  Minimal pontine ischemic gliosis.  4.  No evidence of acute infarction, hemorrhage, or mass lesion. No imaging pattern indicative of specific neurodegenerative disease.        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    There are no features or parkinsonism or Lewy Body Disease.     Results from Dr. Arboleda ([facility] Neuropsychologist) as below: per 2023 evaluation.     \"Impression: The patient's cognitive profile revealed impairments in aspects of memory, with particular difficulty in memory retrieval. 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 Etiologically, her improvement in memory performance with recognition cues on most measures was indicative of executive dysfunction interfering with retrieval, consistent with what is typically seen in patients with vascular cognitive impairment. This is corroborated by her brain MRI findings of moderate chronic microvascular ischemic disease, though she does not have numerous vascular risk factors in her medical history. Her poor rote verbal memory performance, flat learning curves, and low retention rates on all memory measures remain somewhat concerning for incipient Alzheimer's disease (AD). However, AD is less likely at this " "time given her intact language semantic processing, story recognition memory, and visual recognition memory. Additional possible contributing factors to her cognitive difficulties include sleep problems, daytime fatigue, and current stressors. This evaluation may serve as a baseline for longitudinal tracking.  \"        MOCA score of 22/30  today > see media section.  ( Cristin  clearly  has difficulties with short term memory impairments)     Functional Activity Questionnaire > 12 per  (previously was in the 10-11 range). He only several 2's.    Alzheimer's Disease Questionnaire of 8 today per .     There are no signs of parkinsonism at this time.     There are no features of psychosis at this time.        I have performed  a history and physical exam and a directed /focused  ROS today.     Plans:     A. Brain Health topics reviewed today including some books to read about this topic.     B.  Anti amyloid IV medications reviewed ( ie, Leqembi and Kinsunla)  and decision today is  not to pursue such mediation(s) given Risk vs Benefits profile.     C. We discussed the utility of doing a Brain PET imaging at this time after discussing this test with Cristin and  will not proceed with such medication.     D.  We discussed Aricept/cognitive enhancers and use of it /them which she has been prescribed by her primary care doctor. We discussed the pros and cons of restarting Aricept  this medication and if no benefit noticed by  within 6 months will stop it.     -Will start 5 mg a day QPM x 1 month then 10 mg a day with checking heart rate daily for 1st 7 days and then again for 7 days when 10 mg a day dose. Stop medication if side effects such as GI upset,abdominal pain, dizziness,faintness OR heart rate under 50 Beats per minute). These issues were reviewed with Cristin and  today and I gave her a handout on Aricept and it's side effect profile.    E. Repeat formal neuropsychological testing " with Dr. Vikash Arboleda PhD. She is in agreement to do such testing so referral placed today (last done in 12/2023)     Total time spent today or this patient's care was 42   minutes  and included reviewing  the diagnostic workup to date (such as labs and imaging as well as interpreting such tests relevant to this patient's neurological condition),  reviewing/obtaining separately obtained history (from patient and/or accompanying    for today's neurological problem(s) ,counseling and educating the patient and family member on issues related to cognition/memory and cognitive health factors and documenting  the clinical information in the EMR.     Follow up in about 6 months or so.     Fabricio Morales MD  [facility].   [facility]

## 2025-03-05 ENCOUNTER — OFFICE VISIT (OUTPATIENT)
Dept: MEDICAL GROUP | Facility: PHYSICIAN GROUP | Age: 85
End: 2025-03-05
Payer: MEDICARE

## 2025-03-05 VITALS
TEMPERATURE: 97.6 F | HEART RATE: 59 BPM | SYSTOLIC BLOOD PRESSURE: 96 MMHG | DIASTOLIC BLOOD PRESSURE: 60 MMHG | HEIGHT: 60 IN | WEIGHT: 126.6 LBS | OXYGEN SATURATION: 98 % | BODY MASS INDEX: 24.85 KG/M2

## 2025-03-05 DIAGNOSIS — Z13.0 SCREENING FOR DEFICIENCY ANEMIA: ICD-10-CM

## 2025-03-05 DIAGNOSIS — R41.3 MEMORY CHANGES: ICD-10-CM

## 2025-03-05 DIAGNOSIS — Z00.00 HEALTHCARE MAINTENANCE: ICD-10-CM

## 2025-03-05 DIAGNOSIS — J30.2 SEASONAL ALLERGIC RHINITIS, UNSPECIFIED TRIGGER: ICD-10-CM

## 2025-03-05 DIAGNOSIS — R09.81 SINUS CONGESTION: ICD-10-CM

## 2025-03-05 DIAGNOSIS — G31.84 AMNESTIC MCI (MILD COGNITIVE IMPAIRMENT WITH MEMORY LOSS): ICD-10-CM

## 2025-03-05 DIAGNOSIS — F51.01 PRIMARY INSOMNIA: ICD-10-CM

## 2025-03-05 DIAGNOSIS — E55.9 VITAMIN D DEFICIENCY: ICD-10-CM

## 2025-03-05 DIAGNOSIS — Z13.220 SCREENING FOR CHOLESTEROL LEVEL: ICD-10-CM

## 2025-03-05 DIAGNOSIS — M85.89 OSTEOPENIA OF MULTIPLE SITES: ICD-10-CM

## 2025-03-05 DIAGNOSIS — Z78.0 POSTMENOPAUSAL: ICD-10-CM

## 2025-03-05 DIAGNOSIS — Z13.220 LIPID SCREENING: ICD-10-CM

## 2025-03-05 DIAGNOSIS — Z13.1 SCREENING FOR DIABETES MELLITUS: ICD-10-CM

## 2025-03-05 DIAGNOSIS — E03.8 OTHER SPECIFIED HYPOTHYROIDISM: ICD-10-CM

## 2025-03-05 DIAGNOSIS — G31.84 MILD COGNITIVE IMPAIRMENT: ICD-10-CM

## 2025-03-05 DIAGNOSIS — E03.4 HYPOTHYROIDISM DUE TO ACQUIRED ATROPHY OF THYROID: Primary | ICD-10-CM

## 2025-03-05 PROCEDURE — 3074F SYST BP LT 130 MM HG: CPT | Performed by: FAMILY MEDICINE

## 2025-03-05 PROCEDURE — 99214 OFFICE O/P EST MOD 30 MIN: CPT | Performed by: FAMILY MEDICINE

## 2025-03-05 PROCEDURE — 3078F DIAST BP <80 MM HG: CPT | Performed by: FAMILY MEDICINE

## 2025-03-05 RX ORDER — DONEPEZIL HYDROCHLORIDE 10 MG/1
TABLET, FILM COATED ORAL
Qty: 90 TABLET | Refills: 3 | Status: SHIPPED | OUTPATIENT
Start: 2025-03-05 | End: 2026-02-25

## 2025-03-05 RX ORDER — TRAZODONE HYDROCHLORIDE 50 MG/1
50 TABLET ORAL
Qty: 90 TABLET | Refills: 3 | Status: SHIPPED | OUTPATIENT
Start: 2025-03-05

## 2025-03-05 RX ORDER — LEVOTHYROXINE SODIUM 50 UG/1
50 TABLET ORAL
Qty: 90 TABLET | Refills: 3 | Status: SHIPPED | OUTPATIENT
Start: 2025-03-05

## 2025-03-05 RX ORDER — ALBUTEROL SULFATE 90 UG/1
2 INHALANT RESPIRATORY (INHALATION) EVERY 4 HOURS PRN
Qty: 8.5 G | Refills: 3 | Status: SHIPPED | OUTPATIENT
Start: 2025-03-05

## 2025-03-05 ASSESSMENT — FIBROSIS 4 INDEX: FIB4 SCORE: 2.72

## 2025-03-05 ASSESSMENT — PATIENT HEALTH QUESTIONNAIRE - PHQ9: CLINICAL INTERPRETATION OF PHQ2 SCORE: 0

## 2025-03-05 NOTE — PROGRESS NOTES
Verbal consent was acquired by the patient to use [employer] ambient listening note generation during this visit.    Subjective:     HPI:   History of Present Illness  The patient is an 84-year-old female presenting for the establishment of care as a new patient.    She was previously managed by Dr. Montgomery and has a medical history significant for dementia, hypothyroidism, insomnia, allergic rhinitis, and vitamin D deficiency. She is current with all recommended screenings but is due for the influenza and COVID-19 vaccines. Recent laboratory evaluations were conducted in January 2024. She received her influenza vaccine in December 2024 and her Pfizer COVID-19 booster. The patient is under the care of Dr. Morales for cognitive impairment, with biannual visits. She adheres to biannual dental check-ups and annual ophthalmologic examinations. She denies any history of thyroid nodules. The patient does not participate in weight-bearing exercises. Her current pharmacotherapy includes donepezil for cognitive function, levothyroxine for thyroid hormone replacement, trazodone for insomnia, albuterol as needed, Flonase nasal spray for allergic rhinitis, and vitamin D supplementation.    The patient reports experiencing a nocturnal non-productive cough when supine, without associated dyspnea. She does not utilize a humidifier or perform saline nasal irrigations. The cough is absent during daytime hours. She does not consistently use Flonase.    She has been on donepezil for approximately 4 months, with noted improvement in memory function. She denies experiencing dizziness, syncope, or presyncope.    The patient has a history of osteopenia and underwent a bone density scan in 2020. She reported gingival bone growth while on osteoporosis medication and has not received any injectable treatments for osteoporosis.    Supplemental Information  The patient had a cardiology consultation yesterday, during which a follow-up was  recommended in one year. No modifications were made to her medication regimen. The cardiologist identified the presence of a valvular insufficiency.    SOCIAL HISTORY  She does not smoke, use nicotine, cigars, or chew tobacco. She does not use drugs for fun. She drinks a glass of wine every now and again, maybe once a month.    FAMILY HISTORY  Her mother had diabetes. Her father had an aneurysm in his throat. No other heart disease, strokes, high cholesterol, or high blood pressure in the family. Her brother and sister are healthy. She has 2 children, a boy and a girl. Her daughter has a tumor on her nerve or equilibrium, which is benign and monitored with an MRI every 6 months.    ALLERGIES  She is allergic to IBUPROFEN, PENICILLINS, BONIVA, CLINDAMYCIN, and FOSAMAX.    MEDICATIONS  donepezil, levothyroxine, trazodone, albuterol (as needed), Flonase, vitamin D    IMMUNIZATIONS  She received her influenza vaccine in December 2024 and her Pfizer COVID-19 booster.    Health Maintenance: Completed    Objective:     Exam:  BP 96/60 (BP Location: Left arm, Patient Position: Sitting, BP Cuff Size: Adult)   Pulse (!) 59   Temp 36.4 °C (97.6 °F) (Temporal)   Ht 1.524 m (5')   Wt 57.4 kg (126 lb 9.6 oz)   SpO2 98%   BMI 24.72 kg/m²  Body mass index is 24.72 kg/m².    Physical Exam  Vitals reviewed.   Constitutional:       Appearance: Normal appearance.   HENT:      Head: Normocephalic and atraumatic.      Right Ear: External ear normal.      Left Ear: External ear normal.      Nose: Nose normal.   Cardiovascular:      Rate and Rhythm: Normal rate and regular rhythm.      Pulses: Normal pulses.      Heart sounds: Normal heart sounds. No murmur heard.  Pulmonary:      Effort: Pulmonary effort is normal. No respiratory distress.      Breath sounds: Normal breath sounds. No wheezing.   Abdominal:      General: Abdomen is flat.      Palpations: Abdomen is soft.   Skin:     General: Skin is warm and dry.   Neurological:       Mental Status: She is alert and oriented to person, place, and time.   Psychiatric:         Mood and Affect: Mood normal.         Behavior: Behavior normal.             Results      Assessment & Plan:     1. Hypothyroidism due to acquired atrophy of thyroid  TSH WITH REFLEX TO FT4      2. Mild cognitive impairment  CBC WITHOUT DIFFERENTIAL    TSH WITH REFLEX TO FT4    HEMOGLOBIN A1C    Comp Metabolic Panel      3. Memory changes  CBC WITHOUT DIFFERENTIAL    TSH WITH REFLEX TO FT4    HEMOGLOBIN A1C    Comp Metabolic Panel      4. Other specified hypothyroidism  TSH WITH REFLEX TO FT4    levothyroxine (SYNTHROID) 50 MCG Tab      5. Seasonal allergic rhinitis, unspecified trigger        6. Osteopenia of multiple sites  CBC WITHOUT DIFFERENTIAL    TSH WITH REFLEX TO FT4    HEMOGLOBIN A1C    Comp Metabolic Panel      7. Healthcare maintenance  CBC WITHOUT DIFFERENTIAL    TSH WITH REFLEX TO FT4    HEMOGLOBIN A1C    Comp Metabolic Panel      8. Lipid screening        9. Vitamin D deficiency  VITAMIN D,25 HYDROXY (DEFICIENCY)      10. Screening for deficiency anemia  CBC WITHOUT DIFFERENTIAL      11. 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 Sinus congestion        14. Primary insomnia  traZODone (DESYREL) 50 MG Tab      15. Postmenopausal  DS-BONE DENSITY STUDY (DEXA)      16. Screening for cholesterol level            Assessment & Plan  1. Establishment of care.  Her blood pressure is slightly low, but not concerning. Her pulse rate is also on the lower side, which could indicate a healthy heart. She is afebrile, with satisfactory oxygen saturation levels. Her weight is within the normal range. She has a history of osteopenia, which puts her at a moderate risk for fractures. She has been experiencing a dry cough at night, likely due to postnasal drip. She has a history of  dementia and has been on donepezil for about 4 months with positive effects. She has a history of hypothyroidism and is currently on levothyroxine 50 mcg. She has a history of insomnia and is currently taking trazodone. She has a history of allergies and uses Flonase nasal spray as needed. She has a history of vitamin D deficiency and is currently taking vitamin D supplements. She is advised to monitor for symptoms such as lightheadedness, dizziness, or fainting, and to report any such occurrences. She is advised to use a humidifier and perform weekly saline rinses to alleviate her cough. She is advised to continue her current dose of donepezil. She is advised to continue her current dose of levothyroxine. She is advised to continue her current dose of trazodone. She is advised to continue using Flonase as needed for allergy symptoms. She is advised to continue taking vitamin D supplements. A bone density scan will be ordered to monitor her osteopenia. A comprehensive lab workup will be conducted to assess her electrolyte balance, kidney function, liver function, cholesterol levels, thyroid function, hemoglobin, hematocrit, and vitamin D levels. All necessary refills will be provided and sent to [facility] for home delivery.    2. Osteopenia.  She has a history of osteopenia with a moderate risk for fractures. She is advised to incorporate weight-bearing exercises into her routine to maintain bone strength. A bone density scan will be ordered to monitor her condition.    3. Dry cough.  She has been experiencing a dry cough at night, likely due to postnasal drip. She is advised to use a humidifier and perform weekly saline rinses to alleviate her cough. She is also advised to use Flonase nasal spray as needed for allergy symptoms.    4. Memory Changes  She has a history of dementia and has been on donepezil for about 4 months with positive effects. She is advised to continue her current dose of donepezil.    5.  Hypothyroidism.  She has a history of hypothyroidism and is currently on levothyroxine 50 mcg. She is advised to continue her current dose of levothyroxine.    6. Insomnia.  She has a history of insomnia and is currently taking trazodone. She is advised to continue her current dose of trazodone.    7. Allergies.  She has a history of allergies and uses Flonase nasal spray as needed. She is advised to continue using Flonase as needed for allergy symptoms.    8. Vitamin D deficiency.  She has a history of vitamin D deficiency and is currently taking vitamin D supplements. She is advised to continue taking vitamin D supplements.    Follow-up  The patient will follow up in 6 months.    PROCEDURE  The patient has undergone several significant procedures in the past, including appendectomy, bladder sling surgery, bunion treatment, carpal tunnel surgery, clavicle rotator cuff surgery, shoulder arthroscopy, total shoulder replacement, tubal ligation, colonoscopy, hysterectomy, and septoplasty.          Return in about 6 months (around 9/5/2025) for Med check.    Please note that this dictation was created using voice recognition software. I have made every reasonable attempt to correct obvious errors, but I expect that there are errors of grammar and possibly content that I did not discover before finalizing the note.    Viviana Dillon MD  Family Medicine and Non - Operative Sports Medicine   [facility] [place]

## 2025-03-12 ENCOUNTER — APPOINTMENT (OUTPATIENT)
Dept: URBAN - METROPOLITAN AREA CLINIC 4 | Facility: CLINIC | Age: 85
Setting detail: DERMATOLOGY
End: 2025-03-12

## 2025-03-12 DIAGNOSIS — L82.1 OTHER SEBORRHEIC KERATOSIS: ICD-10-CM

## 2025-03-12 DIAGNOSIS — L82.0 INFLAMED SEBORRHEIC KERATOSIS: ICD-10-CM

## 2025-03-12 DIAGNOSIS — L81.4 OTHER MELANIN HYPERPIGMENTATION: ICD-10-CM

## 2025-03-12 DIAGNOSIS — D18.0 HEMANGIOMA: ICD-10-CM

## 2025-03-12 DIAGNOSIS — D22 MELANOCYTIC NEVI: ICD-10-CM

## 2025-03-12 DIAGNOSIS — L57.8 OTHER SKIN CHANGES DUE TO CHRONIC EXPOSURE TO NONIONIZING RADIATION: ICD-10-CM

## 2025-03-12 DIAGNOSIS — L57.0 ACTINIC KERATOSIS: ICD-10-CM | Status: INADEQUATELY CONTROLLED

## 2025-03-12 PROBLEM — D22.5 MELANOCYTIC NEVI OF TRUNK: Status: ACTIVE | Noted: 2025-03-12

## 2025-03-12 PROBLEM — D18.01 HEMANGIOMA OF SKIN AND SUBCUTANEOUS TISSUE: Status: ACTIVE | Noted: 2025-03-12

## 2025-03-12 PROCEDURE — ? SEPARATE AND IDENTIFIABLE DOCUMENTATION

## 2025-03-12 PROCEDURE — 17003 DESTRUCT PREMALG LES 2-14: CPT | Mod: 59

## 2025-03-12 PROCEDURE — ? COUNSELING

## 2025-03-12 PROCEDURE — ? PHOTO-DOCUMENTATION

## 2025-03-12 PROCEDURE — ? LIQUID NITROGEN

## 2025-03-12 PROCEDURE — 17110 DESTRUCTION B9 LES UP TO 14: CPT

## 2025-03-12 PROCEDURE — ? DIAGNOSIS COMMENT

## 2025-03-12 PROCEDURE — ? OBSERVATION

## 2025-03-12 PROCEDURE — 99214 OFFICE O/P EST MOD 30 MIN: CPT | Mod: 25

## 2025-03-12 PROCEDURE — 17000 DESTRUCT PREMALG LESION: CPT | Mod: 59

## 2025-03-12 ASSESSMENT — LOCATION DETAILED DESCRIPTION DERM
LOCATION DETAILED: RIGHT CENTRAL MALAR CHEEK
LOCATION DETAILED: LEFT INFERIOR CENTRAL MALAR CHEEK
LOCATION DETAILED: RIGHT PROXIMAL RADIAL DORSAL FOREARM
LOCATION DETAILED: LEFT SUPERIOR FOREHEAD
LOCATION DETAILED: RIGHT PROXIMAL DORSAL FOREARM
LOCATION DETAILED: RIGHT SUPERIOR CENTRAL MALAR CHEEK
LOCATION DETAILED: LEFT SUPERIOR MEDIAL FOREHEAD
LOCATION DETAILED: RIGHT CENTRAL TEMPLE
LOCATION DETAILED: RIGHT INFERIOR UPPER BACK
LOCATION DETAILED: LEFT DISTAL DORSAL FOREARM
LOCATION DETAILED: LEFT MEDIAL CANTHUS
LOCATION DETAILED: RIGHT ANTERIOR DISTAL THIGH
LOCATION DETAILED: RIGHT SUPERIOR FOREHEAD
LOCATION DETAILED: RIGHT DORSAL WRIST
LOCATION DETAILED: RIGHT DISTAL DORSAL FOREARM
LOCATION DETAILED: NASAL SUPRATIP
LOCATION DETAILED: LEFT MEDIAL SUPERIOR CHEST
LOCATION DETAILED: NASAL TIP
LOCATION DETAILED: LEFT SUPERIOR LATERAL MIDBACK
LOCATION DETAILED: LEFT SUPERIOR LATERAL FOREHEAD
LOCATION DETAILED: RIGHT LATERAL TEMPLE
LOCATION DETAILED: RIGHT SUPERIOR MEDIAL UPPER BACK
LOCATION DETAILED: LEFT ANTERIOR DISTAL THIGH
LOCATION DETAILED: LEFT ANTERIOR PROXIMAL UPPER ARM
LOCATION DETAILED: RIGHT ANTERIOR PROXIMAL UPPER ARM
LOCATION DETAILED: LEFT SUPERIOR CENTRAL MALAR CHEEK
LOCATION DETAILED: RIGHT MID-UPPER BACK

## 2025-03-12 ASSESSMENT — LOCATION SIMPLE DESCRIPTION DERM
LOCATION SIMPLE: NOSE
LOCATION SIMPLE: RIGHT CHEEK
LOCATION SIMPLE: LEFT EYELID
LOCATION SIMPLE: RIGHT THIGH
LOCATION SIMPLE: LEFT UPPER ARM
LOCATION SIMPLE: CHEST
LOCATION SIMPLE: LEFT FOREARM
LOCATION SIMPLE: RIGHT TEMPLE
LOCATION SIMPLE: LEFT THIGH
LOCATION SIMPLE: LEFT FOREHEAD
LOCATION SIMPLE: RIGHT UPPER ARM
LOCATION SIMPLE: LEFT CHEEK
LOCATION SIMPLE: RIGHT FOREHEAD
LOCATION SIMPLE: LEFT LOWER BACK
LOCATION SIMPLE: RIGHT FOREARM
LOCATION SIMPLE: RIGHT WRIST
LOCATION SIMPLE: RIGHT UPPER BACK

## 2025-03-12 ASSESSMENT — LOCATION ZONE DERM
LOCATION ZONE: LEG
LOCATION ZONE: NOSE
LOCATION ZONE: TRUNK
LOCATION ZONE: ARM
LOCATION ZONE: EYELID
LOCATION ZONE: FACE

## 2025-03-12 NOTE — PROCEDURE: LIQUID NITROGEN
Render Note In Bullet Format When Appropriate: No
Medical Necessity Information: It is in your best interest to select a reason for this procedure from the list below. All of these items fulfill various CMS LCD requirements except the new and changing color options.
Detail Level: Detailed
Aperture Size (Optional): C
Consent: The patient's verbal consent was obtained including but not limited to risks of crusting, scabbing, blistering, scarring, darker or lighter pigmentary change, recurrence, incomplete removal and infection.
Application Tool (Optional): Liquid Nitrogen Sprayer
Post-Care Instructions: I reviewed with the patient in detail post-care instructions. Patient is to wear sunprotection, and avoid picking at any of the treated lesions. Pt may apply Vaseline to crusted or scabbing areas.
Number Of Freeze-Thaw Cycles: 1 freeze-thaw cycle
Show Spray Paint Technique Variable?: Yes
Duration Of Freeze Thaw-Cycle (Seconds): 3
Spray Paint Text: The liquid nitrogen was applied to the skin utilizing a spray paint frosting technique.
Medical Necessity Clause: This procedure was medically necessary because the lesions that were treated were:
Detail Level: Simple
Number Of Freeze-Thaw Cycles: 2 freeze-thaw cycles

## 2025-03-17 ENCOUNTER — HOSPITAL ENCOUNTER (OUTPATIENT)
Facility: MEDICAL CENTER | Age: 85
End: 2025-03-17
Attending: UROLOGY
Payer: MEDICARE

## 2025-03-18 ENCOUNTER — HOSPITAL ENCOUNTER (OUTPATIENT)
Facility: MEDICAL CENTER | Age: 85
End: 2025-03-18
Attending: UROLOGY
Payer: MEDICARE

## 2025-03-18 PROCEDURE — 87077 CULTURE AEROBIC IDENTIFY: CPT

## 2025-03-18 PROCEDURE — 87086 URINE CULTURE/COLONY COUNT: CPT

## 2025-03-18 PROCEDURE — 87186 SC STD MICRODIL/AGAR DIL: CPT

## 2025-04-02 ENCOUNTER — HOSPITAL ENCOUNTER (OUTPATIENT)
Dept: RADIOLOGY | Facility: MEDICAL CENTER | Age: 85
End: 2025-04-02
Attending: FAMILY MEDICINE
Payer: MEDICARE

## 2025-04-02 DIAGNOSIS — Z78.0 POSTMENOPAUSAL: ICD-10-CM

## 2025-04-02 PROCEDURE — 77080 DXA BONE DENSITY AXIAL: CPT

## 2025-04-03 ENCOUNTER — RESULTS FOLLOW-UP (OUTPATIENT)
Dept: MEDICAL GROUP | Facility: PHYSICIAN GROUP | Age: 85
End: 2025-04-03

## 2025-05-13 PROBLEM — M48.02 CERVICAL STENOSIS OF SPINE: Status: ACTIVE | Noted: 2025-05-13

## 2025-05-16 ASSESSMENT — FIBROSIS 4 INDEX: FIB4 SCORE: 2.72

## 2025-05-21 ENCOUNTER — APPOINTMENT (OUTPATIENT)
Dept: ADMISSIONS | Facility: MEDICAL CENTER | Age: 85
End: 2025-05-21
Attending: NEUROLOGICAL SURGERY
Payer: MEDICARE

## 2025-05-28 ENCOUNTER — PRE-ADMISSION TESTING (OUTPATIENT)
Dept: ADMISSIONS | Facility: MEDICAL CENTER | Age: 85
End: 2025-05-28
Attending: NEUROLOGICAL SURGERY
Payer: MEDICARE

## 2025-05-28 RX ORDER — TROSPIUM CHLORIDE 20 MG/1
20 TABLET, FILM COATED ORAL 2 TIMES DAILY
COMMUNITY
Start: 2025-04-30

## 2025-05-28 NOTE — PREADMIT AVS NOTE
Current Medications   Medication Instructions    trospium (SANCTURA) 20 MG Tab Continue taking as prescribed.    Omega-3 Fatty Acids (FISH OIL PO) Stop 7 days before surgery    Flaxseed, Linseed, (FLAXSEED OIL PO) Stop 7 days before surgery    CALCIUM PO Stop 7 days before surgery    Cyanocobalamin (VITAMIN B12 PO) Stop 7 days before surgery    albuterol 108 (90 Base) MCG/ACT Aero Soln inhalation aerosol As needed medication, may take if needed, including morning of procedure     donepezil (ARICEPT) 10 MG tablet Continue taking as prescribed.    levothyroxine (SYNTHROID) 50 MCG Tab Continue taking as prescribed.    traZODone (DESYREL) 50 MG Tab Continue taking as prescribed.    fluticasone (FLONASE) 50 MCG/ACT nasal spray As needed medication, may take if needed, including morning of procedure     vitamin D (CHOLECALCIFEROL) 1000 UNIT Tab Stop 7 days before surgery    MULTIVITAMINS PO Stop 7 days before surgery

## 2025-05-29 ENCOUNTER — HOSPITAL ENCOUNTER (OUTPATIENT)
Dept: RADIOLOGY | Facility: MEDICAL CENTER | Age: 85
End: 2025-05-29
Attending: NEUROLOGICAL SURGERY | Admitting: NEUROLOGICAL SURGERY
Payer: MEDICARE

## 2025-05-29 ENCOUNTER — PRE-ADMISSION TESTING (OUTPATIENT)
Dept: ADMISSIONS | Facility: MEDICAL CENTER | Age: 85
End: 2025-05-29
Attending: NEUROLOGICAL SURGERY
Payer: MEDICARE

## 2025-05-29 DIAGNOSIS — M48.02 CERVICAL STENOSIS OF SPINE: ICD-10-CM

## 2025-05-29 LAB
25(OH)D3 SERPL-MCNC: 46 NG/ML (ref 30–100)
ABO GROUP BLD: NORMAL
ANION GAP SERPL CALC-SCNC: 11 MMOL/L (ref 7–16)
APPEARANCE UR: CLEAR
APTT PPP: 26.8 SEC (ref 24.7–36)
BACTERIA #/AREA URNS HPF: ABNORMAL /HPF
BASOPHILS # BLD AUTO: 0.9 % (ref 0–1.8)
BASOPHILS # BLD: 0.05 K/UL (ref 0–0.12)
BILIRUB UR QL STRIP.AUTO: NEGATIVE
BLD GP AB SCN SERPL QL: NORMAL
BUN SERPL-MCNC: 14 MG/DL (ref 8–22)
CALCIUM SERPL-MCNC: 9.7 MG/DL (ref 8.5–10.5)
CASTS URNS QL MICRO: ABNORMAL /LPF (ref 0–2)
CHLORIDE SERPL-SCNC: 106 MMOL/L (ref 96–112)
CO2 SERPL-SCNC: 24 MMOL/L (ref 20–33)
COLOR UR: YELLOW
CREAT SERPL-MCNC: 0.92 MG/DL (ref 0.5–1.4)
EKG IMPRESSION: NORMAL
EOSINOPHIL # BLD AUTO: 0.12 K/UL (ref 0–0.51)
EOSINOPHIL NFR BLD: 2.2 % (ref 0–6.9)
EPITHELIAL CELLS 1715: ABNORMAL /HPF (ref 0–5)
ERYTHROCYTE [DISTWIDTH] IN BLOOD BY AUTOMATED COUNT: 46.1 FL (ref 35.9–50)
EST. AVERAGE GLUCOSE BLD GHB EST-MCNC: 103 MG/DL
GFR SERPLBLD CREATININE-BSD FMLA CKD-EPI: 62 ML/MIN/1.73 M 2
GLUCOSE SERPL-MCNC: 100 MG/DL (ref 65–99)
GLUCOSE UR STRIP.AUTO-MCNC: NEGATIVE MG/DL
HBA1C MFR BLD: 5.2 % (ref 4–5.6)
HCT VFR BLD AUTO: 40.1 % (ref 37–47)
HGB BLD-MCNC: 12.9 G/DL (ref 12–16)
IMM GRANULOCYTES # BLD AUTO: 0.01 K/UL (ref 0–0.11)
IMM GRANULOCYTES NFR BLD AUTO: 0.2 % (ref 0–0.9)
INR PPP: 1.08 (ref 0.87–1.13)
KETONES UR STRIP.AUTO-MCNC: ABNORMAL MG/DL
LEUKOCYTE ESTERASE UR QL STRIP.AUTO: NEGATIVE
LYMPHOCYTES # BLD AUTO: 1.26 K/UL (ref 1–4.8)
LYMPHOCYTES NFR BLD: 23.3 % (ref 22–41)
MCH RBC QN AUTO: 30.3 PG (ref 27–33)
MCHC RBC AUTO-ENTMCNC: 32.2 G/DL (ref 32.2–35.5)
MCV RBC AUTO: 94.1 FL (ref 81.4–97.8)
MICRO URNS: ABNORMAL
MONOCYTES # BLD AUTO: 0.44 K/UL (ref 0–0.85)
MONOCYTES NFR BLD AUTO: 8.1 % (ref 0–13.4)
NEUTROPHILS # BLD AUTO: 3.52 K/UL (ref 1.82–7.42)
NEUTROPHILS NFR BLD: 65.3 % (ref 44–72)
NITRITE UR QL STRIP.AUTO: NEGATIVE
NRBC # BLD AUTO: 0 K/UL
NRBC BLD-RTO: 0 /100 WBC (ref 0–0.2)
PH UR STRIP.AUTO: 6 [PH] (ref 5–8)
PLATELET # BLD AUTO: 262 K/UL (ref 164–446)
PMV BLD AUTO: 10.6 FL (ref 9–12.9)
POTASSIUM SERPL-SCNC: 4.4 MMOL/L (ref 3.6–5.5)
PROT UR QL STRIP: NEGATIVE MG/DL
PROTHROMBIN TIME: 14.1 SEC (ref 12–14.6)
RBC # BLD AUTO: 4.26 M/UL (ref 4.2–5.4)
RBC # URNS HPF: ABNORMAL /HPF (ref 0–2)
RBC UR QL AUTO: ABNORMAL
RH BLD: NORMAL
SODIUM SERPL-SCNC: 141 MMOL/L (ref 135–145)
SP GR UR STRIP.AUTO: 1.02
UROBILINOGEN UR STRIP.AUTO-MCNC: 0.2 EU/DL
WBC # BLD AUTO: 5.4 K/UL (ref 4.8–10.8)
WBC #/AREA URNS HPF: ABNORMAL /HPF

## 2025-05-29 PROCEDURE — 93005 ELECTROCARDIOGRAM TRACING: CPT | Mod: TC

## 2025-05-29 PROCEDURE — 93010 ELECTROCARDIOGRAM REPORT: CPT | Performed by: STUDENT IN AN ORGANIZED HEALTH CARE EDUCATION/TRAINING PROGRAM

## 2025-05-29 PROCEDURE — 86850 RBC ANTIBODY SCREEN: CPT

## 2025-05-29 PROCEDURE — 82306 VITAMIN D 25 HYDROXY: CPT

## 2025-05-29 PROCEDURE — 71046 X-RAY EXAM CHEST 2 VIEWS: CPT

## 2025-05-29 PROCEDURE — 86900 BLOOD TYPING SEROLOGIC ABO: CPT

## 2025-05-29 PROCEDURE — 85730 THROMBOPLASTIN TIME PARTIAL: CPT

## 2025-05-29 PROCEDURE — 86901 BLOOD TYPING SEROLOGIC RH(D): CPT

## 2025-05-29 PROCEDURE — 36415 COLL VENOUS BLD VENIPUNCTURE: CPT

## 2025-05-29 PROCEDURE — 81001 URINALYSIS AUTO W/SCOPE: CPT

## 2025-05-29 PROCEDURE — 85025 COMPLETE CBC W/AUTO DIFF WBC: CPT

## 2025-05-29 PROCEDURE — 83036 HEMOGLOBIN GLYCOSYLATED A1C: CPT | Mod: GA

## 2025-05-29 PROCEDURE — 80048 BASIC METABOLIC PNL TOTAL CA: CPT

## 2025-05-29 PROCEDURE — 85610 PROTHROMBIN TIME: CPT

## 2025-06-02 ENCOUNTER — ANESTHESIA (OUTPATIENT)
Dept: SURGERY | Facility: MEDICAL CENTER | Age: 85
End: 2025-06-02
Payer: MEDICARE

## 2025-06-02 ENCOUNTER — APPOINTMENT (OUTPATIENT)
Dept: RADIOLOGY | Facility: MEDICAL CENTER | Age: 85
End: 2025-06-02
Attending: NEUROLOGICAL SURGERY
Payer: MEDICARE

## 2025-06-02 ENCOUNTER — ANESTHESIA EVENT (OUTPATIENT)
Dept: SURGERY | Facility: MEDICAL CENTER | Age: 85
End: 2025-06-02
Payer: MEDICARE

## 2025-06-02 ENCOUNTER — HOSPITAL ENCOUNTER (OUTPATIENT)
Facility: MEDICAL CENTER | Age: 85
End: 2025-06-03
Attending: NEUROLOGICAL SURGERY | Admitting: NEUROLOGICAL SURGERY
Payer: MEDICARE

## 2025-06-02 DIAGNOSIS — G89.18 POSTOPERATIVE PAIN AFTER SPINAL SURGERY: Primary | ICD-10-CM

## 2025-06-02 PROBLEM — M54.12 CERVICAL RADICULOPATHY: Status: ACTIVE | Noted: 2025-06-02

## 2025-06-02 PROBLEM — M25.511 RIGHT SHOULDER PAIN: Status: ACTIVE | Noted: 2022-04-25

## 2025-06-02 LAB — ABO + RH BLD: NORMAL

## 2025-06-02 PROCEDURE — 97161 PT EVAL LOW COMPLEX 20 MIN: CPT

## 2025-06-02 PROCEDURE — 22853 INSJ BIOMECHANICAL DEVICE: CPT | Mod: ASROC | Performed by: PHYSICIAN ASSISTANT

## 2025-06-02 PROCEDURE — 700101 HCHG RX REV CODE 250: Performed by: NEUROLOGICAL SURGERY

## 2025-06-02 PROCEDURE — 160035 HCHG PACU - 1ST 60 MINS PHASE I: Performed by: NEUROLOGICAL SURGERY

## 2025-06-02 PROCEDURE — 22845 INSERT SPINE FIXATION DEVICE: CPT | Mod: 59 | Performed by: NEUROLOGICAL SURGERY

## 2025-06-02 PROCEDURE — 110454 HCHG SHELL REV 250: Performed by: NEUROLOGICAL SURGERY

## 2025-06-02 PROCEDURE — 160048 HCHG OR STATISTICAL LEVEL 1-5: Performed by: NEUROLOGICAL SURGERY

## 2025-06-02 PROCEDURE — 97535 SELF CARE MNGMENT TRAINING: CPT

## 2025-06-02 PROCEDURE — 96365 THER/PROPH/DIAG IV INF INIT: CPT | Mod: XU

## 2025-06-02 PROCEDURE — G0378 HOSPITAL OBSERVATION PER HR: HCPCS

## 2025-06-02 PROCEDURE — 700105 HCHG RX REV CODE 258: Performed by: NEUROLOGICAL SURGERY

## 2025-06-02 PROCEDURE — C1713 ANCHOR/SCREW BN/BN,TIS/BN: HCPCS | Performed by: NEUROLOGICAL SURGERY

## 2025-06-02 PROCEDURE — 160015 HCHG STAT PREOP MINUTES: Performed by: NEUROLOGICAL SURGERY

## 2025-06-02 PROCEDURE — 700101 HCHG RX REV CODE 250: Performed by: PHYSICIAN ASSISTANT

## 2025-06-02 PROCEDURE — 700106 HCHG RX REV CODE 271: Performed by: NEUROLOGICAL SURGERY

## 2025-06-02 PROCEDURE — 95938 SOMATOSENSORY TESTING: CPT | Mod: XU | Performed by: NEUROLOGICAL SURGERY

## 2025-06-02 PROCEDURE — A9270 NON-COVERED ITEM OR SERVICE: HCPCS | Performed by: PHYSICIAN ASSISTANT

## 2025-06-02 PROCEDURE — 86901 BLOOD TYPING SEROLOGIC RH(D): CPT

## 2025-06-02 PROCEDURE — 86900 BLOOD TYPING SEROLOGIC ABO: CPT

## 2025-06-02 PROCEDURE — 95861 NEEDLE EMG 2 EXTREMITIES: CPT | Mod: XU | Performed by: NEUROLOGICAL SURGERY

## 2025-06-02 PROCEDURE — 20930 SP BONE ALGRFT MORSEL ADD-ON: CPT | Performed by: NEUROLOGICAL SURGERY

## 2025-06-02 PROCEDURE — 160002 HCHG RECOVERY MINUTES (STAT): Performed by: NEUROLOGICAL SURGERY

## 2025-06-02 PROCEDURE — 95940 IONM IN OPERATNG ROOM 15 MIN: CPT | Mod: XU | Performed by: NEUROLOGICAL SURGERY

## 2025-06-02 PROCEDURE — 700105 HCHG RX REV CODE 258: Performed by: PHYSICIAN ASSISTANT

## 2025-06-02 PROCEDURE — 95937 NEUROMUSCULAR JUNCTION TEST: CPT | Mod: XU | Performed by: NEUROLOGICAL SURGERY

## 2025-06-02 PROCEDURE — 22853 INSJ BIOMECHANICAL DEVICE: CPT | Performed by: NEUROLOGICAL SURGERY

## 2025-06-02 PROCEDURE — 96376 TX/PRO/DX INJ SAME DRUG ADON: CPT | Mod: XU

## 2025-06-02 PROCEDURE — 700111 HCHG RX REV CODE 636 W/ 250 OVERRIDE (IP): Performed by: ANESTHESIOLOGY

## 2025-06-02 PROCEDURE — 22551 ARTHRD ANT NTRBDY CERVICAL: CPT | Mod: ASROC | Performed by: PHYSICIAN ASSISTANT

## 2025-06-02 PROCEDURE — 36415 COLL VENOUS BLD VENIPUNCTURE: CPT

## 2025-06-02 PROCEDURE — 160009 HCHG ANES TIME/MIN: Performed by: NEUROLOGICAL SURGERY

## 2025-06-02 PROCEDURE — 160041 HCHG SURGERY MINUTES - EA ADDL 1 MIN LEVEL 4: Performed by: NEUROLOGICAL SURGERY

## 2025-06-02 PROCEDURE — 72040 X-RAY EXAM NECK SPINE 2-3 VW: CPT

## 2025-06-02 PROCEDURE — 110371 HCHG SHELL REV 272: Performed by: NEUROLOGICAL SURGERY

## 2025-06-02 PROCEDURE — 160029 HCHG SURGERY MINUTES - 1ST 30 MINS LEVEL 4: Performed by: NEUROLOGICAL SURGERY

## 2025-06-02 PROCEDURE — 96375 TX/PRO/DX INJ NEW DRUG ADDON: CPT | Mod: XU

## 2025-06-02 PROCEDURE — 700102 HCHG RX REV CODE 250 W/ 637 OVERRIDE(OP): Performed by: PHYSICIAN ASSISTANT

## 2025-06-02 PROCEDURE — 95939 C MOTOR EVOKED UPR&LWR LIMBS: CPT | Mod: XU | Performed by: NEUROLOGICAL SURGERY

## 2025-06-02 PROCEDURE — 700102 HCHG RX REV CODE 250 W/ 637 OVERRIDE(OP): Performed by: ANESTHESIOLOGY

## 2025-06-02 PROCEDURE — 700111 HCHG RX REV CODE 636 W/ 250 OVERRIDE (IP): Performed by: NEUROLOGICAL SURGERY

## 2025-06-02 PROCEDURE — 700111 HCHG RX REV CODE 636 W/ 250 OVERRIDE (IP): Performed by: PHYSICIAN ASSISTANT

## 2025-06-02 PROCEDURE — 20930 SP BONE ALGRFT MORSEL ADD-ON: CPT | Mod: ASROC | Performed by: PHYSICIAN ASSISTANT

## 2025-06-02 PROCEDURE — 22845 INSERT SPINE FIXATION DEVICE: CPT | Mod: ASROC,59 | Performed by: PHYSICIAN ASSISTANT

## 2025-06-02 PROCEDURE — 22551 ARTHRD ANT NTRBDY CERVICAL: CPT | Performed by: NEUROLOGICAL SURGERY

## 2025-06-02 PROCEDURE — 700101 HCHG RX REV CODE 250: Performed by: ANESTHESIOLOGY

## 2025-06-02 PROCEDURE — A9270 NON-COVERED ITEM OR SERVICE: HCPCS | Performed by: ANESTHESIOLOGY

## 2025-06-02 DEVICE — IMPLANTABLE DEVICE: Type: IMPLANTABLE DEVICE | Status: FUNCTIONAL

## 2025-06-02 DEVICE — SCREW 4.0X15 SELF DRILL VAR (1TX12+2TCX12=36): Type: IMPLANTABLE DEVICE | Status: FUNCTIONAL

## 2025-06-02 DEVICE — PLATE ANTERIOR CERVICAL 21MM (1TX1+2TCX1=3): Type: IMPLANTABLE DEVICE | Status: FUNCTIONAL

## 2025-06-02 DEVICE — GRAFT ACTIFUSE ABX PUTTY 1.5ML: Type: IMPLANTABLE DEVICE | Status: FUNCTIONAL

## 2025-06-02 RX ORDER — BISACODYL 10 MG
10 SUPPOSITORY, RECTAL RECTAL
Status: DISCONTINUED | OUTPATIENT
Start: 2025-06-02 | End: 2025-06-03 | Stop reason: HOSPADM

## 2025-06-02 RX ORDER — ONDANSETRON 2 MG/ML
4 INJECTION INTRAMUSCULAR; INTRAVENOUS
Status: DISCONTINUED | OUTPATIENT
Start: 2025-06-02 | End: 2025-06-02 | Stop reason: HOSPADM

## 2025-06-02 RX ORDER — MIDAZOLAM HYDROCHLORIDE 1 MG/ML
1 INJECTION INTRAMUSCULAR; INTRAVENOUS
Status: DISCONTINUED | OUTPATIENT
Start: 2025-06-02 | End: 2025-06-02 | Stop reason: HOSPADM

## 2025-06-02 RX ORDER — SODIUM CHLORIDE, SODIUM LACTATE, POTASSIUM CHLORIDE, AND CALCIUM CHLORIDE .6; .31; .03; .02 G/100ML; G/100ML; G/100ML; G/100ML
IRRIGANT IRRIGATION
Status: DISCONTINUED | OUTPATIENT
Start: 2025-06-02 | End: 2025-06-02 | Stop reason: HOSPADM

## 2025-06-02 RX ORDER — DEXAMETHASONE SODIUM PHOSPHATE 4 MG/ML
INJECTION, SOLUTION INTRA-ARTICULAR; INTRALESIONAL; INTRAMUSCULAR; INTRAVENOUS; SOFT TISSUE PRN
Status: DISCONTINUED | OUTPATIENT
Start: 2025-06-02 | End: 2025-06-02 | Stop reason: SURG

## 2025-06-02 RX ORDER — LIDOCAINE HYDROCHLORIDE 20 MG/ML
INJECTION, SOLUTION EPIDURAL; INFILTRATION; INTRACAUDAL; PERINEURAL PRN
Status: DISCONTINUED | OUTPATIENT
Start: 2025-06-02 | End: 2025-06-02 | Stop reason: SURG

## 2025-06-02 RX ORDER — HYDROMORPHONE HYDROCHLORIDE 1 MG/ML
0.2 INJECTION, SOLUTION INTRAMUSCULAR; INTRAVENOUS; SUBCUTANEOUS
Status: DISCONTINUED | OUTPATIENT
Start: 2025-06-02 | End: 2025-06-02 | Stop reason: HOSPADM

## 2025-06-02 RX ORDER — ALBUTEROL SULFATE 90 UG/1
2 INHALANT RESPIRATORY (INHALATION) EVERY 4 HOURS PRN
Status: DISCONTINUED | OUTPATIENT
Start: 2025-06-02 | End: 2025-06-03 | Stop reason: HOSPADM

## 2025-06-02 RX ORDER — HYDRALAZINE HYDROCHLORIDE 20 MG/ML
5 INJECTION INTRAMUSCULAR; INTRAVENOUS
Status: DISCONTINUED | OUTPATIENT
Start: 2025-06-02 | End: 2025-06-02 | Stop reason: HOSPADM

## 2025-06-02 RX ORDER — CEFAZOLIN SODIUM 1 G/3ML
INJECTION, POWDER, FOR SOLUTION INTRAMUSCULAR; INTRAVENOUS
Status: DISCONTINUED | OUTPATIENT
Start: 2025-06-02 | End: 2025-06-02 | Stop reason: HOSPADM

## 2025-06-02 RX ORDER — HYDROMORPHONE HYDROCHLORIDE 1 MG/ML
0.1 INJECTION, SOLUTION INTRAMUSCULAR; INTRAVENOUS; SUBCUTANEOUS
Status: DISCONTINUED | OUTPATIENT
Start: 2025-06-02 | End: 2025-06-02 | Stop reason: HOSPADM

## 2025-06-02 RX ORDER — MORPHINE SULFATE 4 MG/ML
2 INJECTION INTRAVENOUS ONCE
Status: COMPLETED | OUTPATIENT
Start: 2025-06-02 | End: 2025-06-02

## 2025-06-02 RX ORDER — ALBUTEROL SULFATE 5 MG/ML
2.5 SOLUTION RESPIRATORY (INHALATION)
Status: DISCONTINUED | OUTPATIENT
Start: 2025-06-02 | End: 2025-06-02 | Stop reason: HOSPADM

## 2025-06-02 RX ORDER — MORPHINE SULFATE 4 MG/ML
2 INJECTION INTRAVENOUS
Status: DISCONTINUED | OUTPATIENT
Start: 2025-06-02 | End: 2025-06-03

## 2025-06-02 RX ORDER — DIPHENHYDRAMINE HYDROCHLORIDE 50 MG/ML
25 INJECTION, SOLUTION INTRAMUSCULAR; INTRAVENOUS EVERY 6 HOURS PRN
Status: DISCONTINUED | OUTPATIENT
Start: 2025-06-02 | End: 2025-06-03 | Stop reason: HOSPADM

## 2025-06-02 RX ORDER — EPHEDRINE SULFATE 50 MG/ML
INJECTION, SOLUTION INTRAVENOUS PRN
Status: DISCONTINUED | OUTPATIENT
Start: 2025-06-02 | End: 2025-06-02 | Stop reason: SURG

## 2025-06-02 RX ORDER — HYDROMORPHONE HYDROCHLORIDE 1 MG/ML
0.4 INJECTION, SOLUTION INTRAMUSCULAR; INTRAVENOUS; SUBCUTANEOUS
Status: DISCONTINUED | OUTPATIENT
Start: 2025-06-02 | End: 2025-06-02 | Stop reason: HOSPADM

## 2025-06-02 RX ORDER — DONEPEZIL HYDROCHLORIDE 5 MG/1
10 TABLET, FILM COATED ORAL NIGHTLY
Status: DISCONTINUED | OUTPATIENT
Start: 2025-06-02 | End: 2025-06-03 | Stop reason: HOSPADM

## 2025-06-02 RX ORDER — SODIUM CHLORIDE, SODIUM LACTATE, POTASSIUM CHLORIDE, CALCIUM CHLORIDE 600; 310; 30; 20 MG/100ML; MG/100ML; MG/100ML; MG/100ML
INJECTION, SOLUTION INTRAVENOUS CONTINUOUS
Status: DISCONTINUED | OUTPATIENT
Start: 2025-06-02 | End: 2025-06-02 | Stop reason: HOSPADM

## 2025-06-02 RX ORDER — ACETAMINOPHEN 500 MG
1000 TABLET ORAL EVERY 6 HOURS
Status: DISCONTINUED | OUTPATIENT
Start: 2025-06-02 | End: 2025-06-03 | Stop reason: HOSPADM

## 2025-06-02 RX ORDER — POLYETHYLENE GLYCOL 3350 17 G/17G
1 POWDER, FOR SOLUTION ORAL 2 TIMES DAILY PRN
Status: DISCONTINUED | OUTPATIENT
Start: 2025-06-02 | End: 2025-06-03 | Stop reason: HOSPADM

## 2025-06-02 RX ORDER — DIPHENHYDRAMINE HCL 25 MG
25 TABLET ORAL EVERY 6 HOURS PRN
Status: DISCONTINUED | OUTPATIENT
Start: 2025-06-02 | End: 2025-06-03 | Stop reason: HOSPADM

## 2025-06-02 RX ORDER — ROCURONIUM BROMIDE 10 MG/ML
INJECTION, SOLUTION INTRAVENOUS PRN
Status: DISCONTINUED | OUTPATIENT
Start: 2025-06-02 | End: 2025-06-02 | Stop reason: SURG

## 2025-06-02 RX ORDER — SODIUM CHLORIDE AND POTASSIUM CHLORIDE 150; 900 MG/100ML; MG/100ML
INJECTION, SOLUTION INTRAVENOUS CONTINUOUS
Status: DISCONTINUED | OUTPATIENT
Start: 2025-06-02 | End: 2025-06-03

## 2025-06-02 RX ORDER — DIPHENHYDRAMINE HYDROCHLORIDE 50 MG/ML
12.5 INJECTION, SOLUTION INTRAMUSCULAR; INTRAVENOUS
Status: DISCONTINUED | OUTPATIENT
Start: 2025-06-02 | End: 2025-06-02 | Stop reason: HOSPADM

## 2025-06-02 RX ORDER — ONDANSETRON 2 MG/ML
4 INJECTION INTRAMUSCULAR; INTRAVENOUS EVERY 4 HOURS PRN
Status: DISCONTINUED | OUTPATIENT
Start: 2025-06-02 | End: 2025-06-03 | Stop reason: HOSPADM

## 2025-06-02 RX ORDER — BUPIVACAINE HYDROCHLORIDE AND EPINEPHRINE 5; 5 MG/ML; UG/ML
INJECTION, SOLUTION EPIDURAL; INTRACAUDAL; PERINEURAL
Status: DISCONTINUED | OUTPATIENT
Start: 2025-06-02 | End: 2025-06-02 | Stop reason: HOSPADM

## 2025-06-02 RX ORDER — CEFAZOLIN SODIUM 1 G/3ML
2 INJECTION, POWDER, FOR SOLUTION INTRAMUSCULAR; INTRAVENOUS ONCE
Status: COMPLETED | OUTPATIENT
Start: 2025-06-02 | End: 2025-06-02

## 2025-06-02 RX ORDER — SODIUM PHOSPHATE,MONO-DIBASIC 19G-7G/118
1 ENEMA (ML) RECTAL
Status: DISCONTINUED | OUTPATIENT
Start: 2025-06-02 | End: 2025-06-03 | Stop reason: HOSPADM

## 2025-06-02 RX ORDER — ACETAMINOPHEN 500 MG
1000 TABLET ORAL EVERY 6 HOURS PRN
Status: DISCONTINUED | OUTPATIENT
Start: 2025-06-03 | End: 2025-06-03 | Stop reason: HOSPADM

## 2025-06-02 RX ORDER — LABETALOL HYDROCHLORIDE 5 MG/ML
10 INJECTION, SOLUTION INTRAVENOUS
Status: DISCONTINUED | OUTPATIENT
Start: 2025-06-02 | End: 2025-06-03 | Stop reason: HOSPADM

## 2025-06-02 RX ORDER — GLYCOPYRROLATE 0.2 MG/ML
INJECTION INTRAMUSCULAR; INTRAVENOUS PRN
Status: DISCONTINUED | OUTPATIENT
Start: 2025-06-02 | End: 2025-06-02 | Stop reason: SURG

## 2025-06-02 RX ORDER — HALOPERIDOL 5 MG/ML
1 INJECTION INTRAMUSCULAR
Status: DISCONTINUED | OUTPATIENT
Start: 2025-06-02 | End: 2025-06-02 | Stop reason: HOSPADM

## 2025-06-02 RX ORDER — ONDANSETRON 2 MG/ML
INJECTION INTRAMUSCULAR; INTRAVENOUS PRN
Status: DISCONTINUED | OUTPATIENT
Start: 2025-06-02 | End: 2025-06-02 | Stop reason: SURG

## 2025-06-02 RX ORDER — OXYCODONE HCL 5 MG/5 ML
10 SOLUTION, ORAL ORAL
Status: COMPLETED | OUTPATIENT
Start: 2025-06-02 | End: 2025-06-02

## 2025-06-02 RX ORDER — MEPERIDINE HYDROCHLORIDE 50 MG/ML
12.5 INJECTION INTRAMUSCULAR; INTRAVENOUS; SUBCUTANEOUS
Status: DISCONTINUED | OUTPATIENT
Start: 2025-06-02 | End: 2025-06-02 | Stop reason: HOSPADM

## 2025-06-02 RX ORDER — AMOXICILLIN 250 MG
1 CAPSULE ORAL
Status: DISCONTINUED | OUTPATIENT
Start: 2025-06-02 | End: 2025-06-03 | Stop reason: HOSPADM

## 2025-06-02 RX ORDER — DEXAMETHASONE SODIUM PHOSPHATE 4 MG/ML
4 INJECTION, SOLUTION INTRA-ARTICULAR; INTRALESIONAL; INTRAMUSCULAR; INTRAVENOUS; SOFT TISSUE EVERY 6 HOURS
Status: COMPLETED | OUTPATIENT
Start: 2025-06-02 | End: 2025-06-03

## 2025-06-02 RX ORDER — SODIUM CHLORIDE, SODIUM LACTATE, POTASSIUM CHLORIDE, CALCIUM CHLORIDE 600; 310; 30; 20 MG/100ML; MG/100ML; MG/100ML; MG/100ML
INJECTION, SOLUTION INTRAVENOUS CONTINUOUS
Status: ACTIVE | OUTPATIENT
Start: 2025-06-02 | End: 2025-06-02

## 2025-06-02 RX ORDER — MAGNESIUM HYDROXIDE 1200 MG/15ML
LIQUID ORAL
Status: COMPLETED | OUTPATIENT
Start: 2025-06-02 | End: 2025-06-02

## 2025-06-02 RX ORDER — CYCLOBENZAPRINE HCL 10 MG
10 TABLET ORAL EVERY 8 HOURS PRN
Status: DISCONTINUED | OUTPATIENT
Start: 2025-06-02 | End: 2025-06-03 | Stop reason: HOSPADM

## 2025-06-02 RX ORDER — DOCUSATE SODIUM 100 MG/1
100 CAPSULE, LIQUID FILLED ORAL 2 TIMES DAILY
Status: DISCONTINUED | OUTPATIENT
Start: 2025-06-02 | End: 2025-06-03 | Stop reason: HOSPADM

## 2025-06-02 RX ORDER — OXYBUTYNIN CHLORIDE 5 MG/1
5 TABLET ORAL 2 TIMES DAILY
Status: DISCONTINUED | OUTPATIENT
Start: 2025-06-02 | End: 2025-06-03 | Stop reason: HOSPADM

## 2025-06-02 RX ORDER — ALPRAZOLAM 0.25 MG
0.25 TABLET ORAL 2 TIMES DAILY PRN
Status: DISCONTINUED | OUTPATIENT
Start: 2025-06-02 | End: 2025-06-03 | Stop reason: HOSPADM

## 2025-06-02 RX ORDER — OXYCODONE HCL 5 MG/5 ML
5 SOLUTION, ORAL ORAL
Status: COMPLETED | OUTPATIENT
Start: 2025-06-02 | End: 2025-06-02

## 2025-06-02 RX ORDER — LEVOTHYROXINE SODIUM 50 UG/1
50 TABLET ORAL
Status: DISCONTINUED | OUTPATIENT
Start: 2025-06-02 | End: 2025-06-03 | Stop reason: HOSPADM

## 2025-06-02 RX ORDER — EPHEDRINE SULFATE 50 MG/ML
5 INJECTION, SOLUTION INTRAVENOUS
Status: DISCONTINUED | OUTPATIENT
Start: 2025-06-02 | End: 2025-06-02 | Stop reason: HOSPADM

## 2025-06-02 RX ORDER — AMOXICILLIN 250 MG
1 CAPSULE ORAL NIGHTLY
Status: DISCONTINUED | OUTPATIENT
Start: 2025-06-02 | End: 2025-06-03 | Stop reason: HOSPADM

## 2025-06-02 RX ORDER — ONDANSETRON 4 MG/1
4 TABLET, ORALLY DISINTEGRATING ORAL EVERY 4 HOURS PRN
Status: DISCONTINUED | OUTPATIENT
Start: 2025-06-02 | End: 2025-06-03 | Stop reason: HOSPADM

## 2025-06-02 RX ADMIN — EPHEDRINE SULFATE 5 MG: 50 INJECTION, SOLUTION INTRAVENOUS at 07:15

## 2025-06-02 RX ADMIN — POLYETHYLENE GLYCOL 3350 1 PACKET: 17 POWDER, FOR SOLUTION ORAL at 17:12

## 2025-06-02 RX ADMIN — CEFAZOLIN 2 G: 1 INJECTION, POWDER, FOR SOLUTION INTRAMUSCULAR; INTRAVENOUS at 06:58

## 2025-06-02 RX ADMIN — POTASSIUM CHLORIDE AND SODIUM CHLORIDE: 900; 150 INJECTION, SOLUTION INTRAVENOUS at 23:22

## 2025-06-02 RX ADMIN — DOCUSATE SODIUM 100 MG: 100 CAPSULE, LIQUID FILLED ORAL at 17:09

## 2025-06-02 RX ADMIN — EPHEDRINE SULFATE 5 MG: 50 INJECTION, SOLUTION INTRAVENOUS at 07:22

## 2025-06-02 RX ADMIN — OXYBUTYNIN CHLORIDE 5 MG: 5 TABLET ORAL at 17:09

## 2025-06-02 RX ADMIN — ACETAMINOPHEN 1000 MG: 500 TABLET ORAL at 23:24

## 2025-06-02 RX ADMIN — CEFAZOLIN 2 G: 2 INJECTION, POWDER, FOR SOLUTION INTRAVENOUS at 23:23

## 2025-06-02 RX ADMIN — ACETAMINOPHEN 1000 MG: 500 TABLET ORAL at 17:08

## 2025-06-02 RX ADMIN — PROPOFOL 100 MCG/KG/MIN: 10 INJECTION, EMULSION INTRAVENOUS at 07:06

## 2025-06-02 RX ADMIN — ONDANSETRON 4 MG: 2 INJECTION INTRAMUSCULAR; INTRAVENOUS at 07:35

## 2025-06-02 RX ADMIN — FENTANYL CITRATE 100 MCG: 50 INJECTION, SOLUTION INTRAMUSCULAR; INTRAVENOUS at 07:02

## 2025-06-02 RX ADMIN — CEFAZOLIN 2 G: 2 INJECTION, POWDER, FOR SOLUTION INTRAVENOUS at 14:17

## 2025-06-02 RX ADMIN — GLYCOPYRROLATE 0.2 MG: 0.2 INJECTION INTRAMUSCULAR; INTRAVENOUS at 07:24

## 2025-06-02 RX ADMIN — DEXAMETHASONE SODIUM PHOSPHATE 4 MG: 4 INJECTION, SOLUTION INTRA-ARTICULAR; INTRALESIONAL; INTRAMUSCULAR; INTRAVENOUS; SOFT TISSUE at 13:14

## 2025-06-02 RX ADMIN — OXYCODONE HYDROCHLORIDE 5 MG: 5 SOLUTION ORAL at 08:23

## 2025-06-02 RX ADMIN — PROPOFOL 70 MG: 10 INJECTION, EMULSION INTRAVENOUS at 07:02

## 2025-06-02 RX ADMIN — DEXAMETHASONE SODIUM PHOSPHATE 10 MG: 4 INJECTION INTRA-ARTICULAR; INTRALESIONAL; INTRAMUSCULAR; INTRAVENOUS; SOFT TISSUE at 07:02

## 2025-06-02 RX ADMIN — EPHEDRINE SULFATE 5 MG: 50 INJECTION, SOLUTION INTRAVENOUS at 07:04

## 2025-06-02 RX ADMIN — Medication: at 10:23

## 2025-06-02 RX ADMIN — LIDOCAINE HYDROCHLORIDE 100 MG: 20 INJECTION, SOLUTION EPIDURAL; INFILTRATION; INTRACAUDAL; PERINEURAL at 07:02

## 2025-06-02 RX ADMIN — DOCUSATE SODIUM 50 MG AND SENNOSIDES 8.6 MG 1 TABLET: 8.6; 5 TABLET, FILM COATED ORAL at 21:36

## 2025-06-02 RX ADMIN — MORPHINE SULFATE 2 MG: 4 INJECTION INTRAVENOUS at 10:22

## 2025-06-02 RX ADMIN — DEXAMETHASONE SODIUM PHOSPHATE 4 MG: 4 INJECTION, SOLUTION INTRA-ARTICULAR; INTRALESIONAL; INTRAMUSCULAR; INTRAVENOUS; SOFT TISSUE at 19:33

## 2025-06-02 RX ADMIN — SUGAMMADEX 200 MG: 100 INJECTION, SOLUTION INTRAVENOUS at 07:10

## 2025-06-02 RX ADMIN — DONEPEZIL HYDROCHLORIDE 10 MG: 5 TABLET, FILM COATED ORAL at 21:35

## 2025-06-02 RX ADMIN — POTASSIUM CHLORIDE AND SODIUM CHLORIDE: 900; 150 INJECTION, SOLUTION INTRAVENOUS at 10:22

## 2025-06-02 RX ADMIN — SODIUM CHLORIDE, POTASSIUM CHLORIDE, SODIUM LACTATE AND CALCIUM CHLORIDE: 600; 310; 30; 20 INJECTION, SOLUTION INTRAVENOUS at 06:58

## 2025-06-02 RX ADMIN — ROCURONIUM BROMIDE 30 MG: 10 INJECTION INTRAVENOUS at 07:02

## 2025-06-02 ASSESSMENT — LIFESTYLE VARIABLES
CONSUMPTION TOTAL: NEGATIVE
HAVE PEOPLE ANNOYED YOU BY CRITICIZING YOUR DRINKING: NO
AVERAGE NUMBER OF DAYS PER WEEK YOU HAVE A DRINK CONTAINING ALCOHOL: 0
TOTAL SCORE: 0
DOES PATIENT WANT TO STOP DRINKING: NO
TOTAL SCORE: 0
HOW MANY TIMES IN THE PAST YEAR HAVE YOU HAD 5 OR MORE DRINKS IN A DAY: 0
EVER HAD A DRINK FIRST THING IN THE MORNING TO STEADY YOUR NERVES TO GET RID OF A HANGOVER: NO
ALCOHOL_USE: NO
HAVE YOU EVER FELT YOU SHOULD CUT DOWN ON YOUR DRINKING: NO
TOTAL SCORE: 0
EVER FELT BAD OR GUILTY ABOUT YOUR DRINKING: NO
ON A TYPICAL DAY WHEN YOU DRINK ALCOHOL HOW MANY DRINKS DO YOU HAVE: 0

## 2025-06-02 ASSESSMENT — PAIN DESCRIPTION - PAIN TYPE
TYPE: ACUTE PAIN;SURGICAL PAIN
TYPE: SURGICAL PAIN;ACUTE PAIN

## 2025-06-02 ASSESSMENT — COGNITIVE AND FUNCTIONAL STATUS - GENERAL
MOBILITY SCORE: 18
WALKING IN HOSPITAL ROOM: A LITTLE
SUGGESTED CMS G CODE MODIFIER DAILY ACTIVITY: CK
CLIMB 3 TO 5 STEPS WITH RAILING: A LITTLE
MOVING FROM LYING ON BACK TO SITTING ON SIDE OF FLAT BED: A LITTLE
STANDING UP FROM CHAIR USING ARMS: A LITTLE
MOVING TO AND FROM BED TO CHAIR: A LITTLE
STANDING UP FROM CHAIR USING ARMS: A LITTLE
MOVING TO AND FROM BED TO CHAIR: A LITTLE
SUGGESTED CMS G CODE MODIFIER MOBILITY: CK
MOVING FROM LYING ON BACK TO SITTING ON SIDE OF FLAT BED: A LITTLE
CLIMB 3 TO 5 STEPS WITH RAILING: A LITTLE
TURNING FROM BACK TO SIDE WHILE IN FLAT BAD: A LITTLE
MOBILITY SCORE: 18
TOILETING: A LITTLE
TURNING FROM BACK TO SIDE WHILE IN FLAT BAD: A LITTLE
DRESSING REGULAR LOWER BODY CLOTHING: A LITTLE
SUGGESTED CMS G CODE MODIFIER MOBILITY: CK
WALKING IN HOSPITAL ROOM: A LITTLE
DAILY ACTIVITIY SCORE: 18
HELP NEEDED FOR BATHING: A LITTLE
PERSONAL GROOMING: A LITTLE
DRESSING REGULAR UPPER BODY CLOTHING: A LITTLE
EATING MEALS: A LITTLE

## 2025-06-02 ASSESSMENT — SOCIAL DETERMINANTS OF HEALTH (SDOH)
WITHIN THE LAST YEAR, HAVE YOU BEEN HUMILIATED OR EMOTIONALLY ABUSED IN OTHER WAYS BY YOUR PARTNER OR EX-PARTNER?: NO
IN THE PAST 12 MONTHS, HAS THE ELECTRIC, GAS, OIL, OR WATER COMPANY THREATENED TO SHUT OFF SERVICE IN YOUR HOME?: NO
WITHIN THE LAST YEAR, HAVE YOU BEEN KICKED, HIT, SLAPPED, OR OTHERWISE PHYSICALLY HURT BY YOUR PARTNER OR EX-PARTNER?: NO
WITHIN THE LAST YEAR, HAVE YOU BEEN AFRAID OF YOUR PARTNER OR EX-PARTNER?: NO
WITHIN THE PAST 12 MONTHS, YOU WORRIED THAT YOUR FOOD WOULD RUN OUT BEFORE YOU GOT THE MONEY TO BUY MORE: NEVER TRUE
WITHIN THE LAST YEAR, HAVE TO BEEN RAPED OR FORCED TO HAVE ANY KIND OF SEXUAL ACTIVITY BY YOUR PARTNER OR EX-PARTNER?: NO
WITHIN THE PAST 12 MONTHS, THE FOOD YOU BOUGHT JUST DIDN'T LAST AND YOU DIDN'T HAVE MONEY TO GET MORE: NEVER TRUE

## 2025-06-02 ASSESSMENT — PATIENT HEALTH QUESTIONNAIRE - PHQ9
SUM OF ALL RESPONSES TO PHQ9 QUESTIONS 1 AND 2: 0
1. LITTLE INTEREST OR PLEASURE IN DOING THINGS: NOT AT ALL
2. FEELING DOWN, DEPRESSED, IRRITABLE, OR HOPELESS: NOT AT ALL

## 2025-06-02 ASSESSMENT — GAIT ASSESSMENTS
DEVIATION: NO DEVIATION
GAIT LEVEL OF ASSIST: STANDBY ASSIST
DISTANCE (FEET): 250

## 2025-06-02 ASSESSMENT — FIBROSIS 4 INDEX: FIB4 SCORE: 2.55

## 2025-06-02 ASSESSMENT — PAIN SCALES - GENERAL: PAIN_LEVEL: 0

## 2025-06-02 NOTE — ANESTHESIA TIME REPORT
Anesthesia Start and Stop Event Times       Date Time Event    6/2/2025 0632 Ready for Procedure     0658 Anesthesia Start     0815 Anesthesia Stop          Responsible Staff  06/02/25      Name Role Begin End    Ramin Ivey M.D. Anesth 0658 [id number]          Overtime Reason:  no overtime (within assigned shift)    Comments:

## 2025-06-02 NOTE — THERAPY
Physical Therapy   Initial Evaluation     Patient Name: Cristin Elizalde  Age:  84 y.o., Sex:  female  Medical Record #: [id number]  Today's Date: 6/2/2025     Precautions  Precautions: (P) Cervical Collar  ;Spinal / Back Precautions   Comments: (P) soft colllar on AAT /x shower and meals    Assessment    84 y.o. female was admitted for C4-C5 ACDF.  PMHx includes osteopenia and asthma.  She lives with her  in a 1SH with 1 ARMIN and was independent for mobility without an AD.  ON eval, she presents with pain and orthopedic restrictions impairing her mobility.  She will benefit from continue acute PT services for education and safe mobility reinforcement in order to return home safely.  Recommend outpatient PT services when cleared by her surgeon.    Plan    Physical Therapy Initial Treatment Plan   Treatment Plan : (P) Bed Mobility, Family / Caregiver Training, Gait Training, Equipment, Manual Therapy, Neuro Re-Education / Balance, Self Care / Home Evaluation, Stair Training, Therapeutic Activities, Therapeutic Exercise  Treatment Frequency: (P) 5 Times per Week  Duration: (P) Until Therapy Goals Met    DC Equipment Recommendations: (P) None  Discharge Recommendations: (P) Recommend outpatient physical therapy services to address higher level deficits            Objective       06/02/25 1413   Initial Contact Note    Initial Contact Note Order Received and Verified, Physical Therapy Evaluation in Progress with Full Report to Follow.   Precautions   Precautions Cervical Collar  ;Spinal / Back Precautions    Comments soft colllar on AAT /x shower and meals   Vitals   Pulse 81  (supine, 77 sitting, 76 /p ambulation)   Blood Pressure  (!) 143/73  (supine, 160/66 sitting, 145/61 /p ambulation)   Room Air Oximetry 87  (/p ambulation with recovery to 90% within 30 sec)   O2 Delivery Device Nasal Cannula;None - Room Air   Pain 0 - 10 Group   Therapist Pain Assessment Post Activity Pain Same as Prior to Activity  (L side  "headache)   Prior Living Situation   Housing / Facility 1 Story House   Steps Into Home 1  (~2\" per pt)   Bathroom Set up Walk In Shower;Bathtub / Shower Combination;Grab Bars;Shower Chair   Equipment Owned 4-Wheel Walker;Single Point Cane;Tub / Shower Seat;Adjustable Bed Without Rails   Lives with - Patient's Self Care Capacity Spouse  (will be home with her)   Comments sister came in to help her /p sx   Prior Level of Functional Mobility   Bed Mobility Independent   Transfer Status Independent   Ambulation Independent   Ambulation Distance community   Comments drives, doesn't work   History of Falls   History of Falls No   Date of Last Fall   (Pt reports no falls x1 year)   Cognition    Cognition / Consciousness WDL   Level of Consciousness Alert   Active ROM Upper Body   Comments WFL for mobility   Strength Upper Body   Comments WFL for mobility   Active ROM Lower Body    Active ROM Lower Body  WDL   Strength Lower Body   Lower Body Strength  WDL   Sensation Lower Body   Lower Extremity Sensation   WDL   Coordination Lower Body    Coordination Lower Body  WDL   Balance Assessment   Sitting Balance (Static) Fair +   Sitting Balance (Dynamic) Fair +   Standing Balance (Static) Fair   Standing Balance (Dynamic) Fair -   Weight Shift Sitting Fair   Weight Shift Standing Fair   Comments without AD   Bed Mobility    Supine to Sit Standby Assist   Sit to Supine Standby Assist   Scooting Standby Assist   Rolling Standby Assist   Comments bed flat, rail, edu log rolling   Gait Analysis   Gait Level Of Assist Standby Assist   Assistive Device None   Distance (Feet) 250   # of Times Distance was Traveled 1   Deviation No deviation   # of Stairs Climbed 3  (step to, 1 rail)   Level of Assist with Stairs Standby Assist   Weight Bearing Status FWB BLEs   Functional Mobility   Sit to Stand Standby Assist   Bed, Chair, Wheelchair Transfer Standby Assist   Transfer Method Stand Step   Mobility without AD   Edema / Skin Assessment "   Edema / Skin  Not Assessed   Comments c-collar in place   Short Term Goals    Short Term Goal # 1 Pt will perform supine < > sit SPV with bed flat, no rail in 6 visits in order to set up for upright mobility   Short Term Goal # 2 Pt will perform sit < > stand SPV in 6 visits in order to prepare for ambulation   Short Term Goal # 3 Pt will ambulate 150' SPV /s AD in 6 visits in order to return home safely   Short Term Goal # 4 Pt will ascend/ descend 1 steps SPV in 6 visits in order to access her home   Education Group   Education Provided Cervical Precautions;Role of Physical Therapist;Stair Training;Brace Wear and Care   Cervical Precautions Patient Response Patient;Acceptance;Explanation;Handout;Action Demonstration;Reinforcement Needed   Role of Physical Therapist Patient Response Patient;Acceptance;Explanation;Action Demonstration   Stair Training Patient Response Patient;Acceptance;Explanation;Action Demonstration;Reinforcement Needed   Brace Wear & Care Patient Response Patient;Acceptance;Explanation;Handout;Action Demonstration;Reinforcement Needed   Physical Therapy Initial Treatment Plan    Treatment Plan  Bed Mobility;Family / Caregiver Training;Gait Training;Equipment;Manual Therapy;Neuro Re-Education / Balance;Self Care / Home Evaluation;Stair Training;Therapeutic Activities;Therapeutic Exercise   Treatment Frequency 5 Times per Week   Duration Until Therapy Goals Met   Problem List    Problems Impaired Bed Mobility;Impaired Transfers;Impaired Ambulation;Limited Knowledge of Post-Op Precautions;Pain   Anticipated Discharge Equipment and Recommendations   DC Equipment Recommendations None   Discharge Recommendations Recommend outpatient physical therapy services to address higher level deficits   Interdisciplinary Plan of Care Collaboration   IDT Collaboration with  Nursing   Patient Position at End of Therapy In Bed;Call Light within Reach;Tray Table within Reach   Collaboration Comments RN updated    Session Information   Date / Session Number  6/2 -1 (1/5, 6/8)
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
## 2025-06-02 NOTE — ANESTHESIA POSTPROCEDURE EVALUATION
Patient: Cristin Elizalde    Procedure Summary       Date: 06/02/25 Room / Location: [place] [address] / SURGERY [facility]    Anesthesia Start: 0658 Anesthesia Stop: 0815    Procedure: C4/5 anterior cervical decompression and instrumented fusion (Spine Cervical) Diagnosis:       Cervical stenosis of spine      (Cervical stenosis of spine [M48.02])    Surgeons: Bisi Campos M.D. Responsible Provider: Ramin Ivey M.D.    Anesthesia Type: general ASA Status: 2            Final Anesthesia Type: general  Last vitals  BP   Blood Pressure : (!) 158/68    Temp   36.9 °C (98.4 °F)    Pulse   (!) 120   Resp   17    SpO2   94 %      Anesthesia Post Evaluation    Patient location during evaluation: PACU  Patient participation: complete - patient participated  Level of consciousness: awake and alert  Pain score: 0    Airway patency: patent  Anesthetic complications: no  Cardiovascular status: hemodynamically stable  Respiratory status: acceptable  Hydration status: euvolemic    PONV: none        No notable events documented.     Nurse Pain Score: 0 (NPRS)

## 2025-06-02 NOTE — OR SURGEON
Immediate Post OP Note    5. PREOPERATIVE DIAGNOSIS:  Cervical stenosis     1.  Right shoulder pain  2.  Status post right shoulder placement 2022  3.  Possible right shoulder radiculopathy  4.  Mild-Moderate neuroforaminal narrowing C3-C4, C4-C5 and C5-C6.  The worst neuroforaminal narrowing is on the right side at C4-5.  5.  Chronic right C5 radiculopathy     6. POSTOPERATIVE DIAGNOSIS:  Cervical stenosis     1.  Right shoulder pain  2.  Status post right shoulder placement 2022  3.  Possible right shoulder radiculopathy  4.  Mild-Moderate neuroforaminal narrowing C3-C4, C4-C5 and C5-C6.  The worst neuroforaminal narrowing is on the right side at C4-5.  5.  Chronic right C5 radiculopathy      Procedure(s):  C4/5 anterior cervical decompression and instrumented fusion - Wound Class: Clean with Drain    Surgeon(s):  Bisi Campos M.D.    Anesthesiologist/Type of Anesthesia:  Anesthesiologist: Ramin Ivey M.D./General    Surgical Staff:  Assistant: Ronny May P.A.-C.  Circulator: Anabelle Drew R.N.  Scrub Person: Fartun Lacy  Radiology Technologist: Gutierrez Tyson    Specimens removed if any:  * No specimens in log *    Assistants: Carlitos Webster PA-C  Specimen: nil    Estimated Blood Loss: 5 cc    Findings: n/a    Complications: nil        6/2/2025 8:29 AM Bisi Campos M.D.

## 2025-06-02 NOTE — PROGRESS NOTES
Medication history reviewed with PT's Sal velasquez at bedside    Med rec is complete per spouse reporting    Allergies reviewed.     Spouse denies any outpatient antibiotics in the last 30 days.     Patient is not taking anticoagulants.    Dispense history is partially available in [employer].    Preferred pharmacy for this encounter - [facility] on [address] ([phone])

## 2025-06-02 NOTE — CARE PLAN
Return ob. Doing well   GBS done today.   F/u in 1 week.    The patient is Stable - Low risk of patient condition declining or worsening    Shift Goals  Clinical Goals: pain control, monitor hemovac, comfort  Patient Goals: pain control, comfort  Family Goals: not preset    Progress made toward(s) clinical / shift goals:    Problem: Knowledge Deficit - Standard  Goal: Patient and family/care givers will demonstrate understanding of plan of care, disease process/condition, diagnostic tests and medications  Outcome: Progressing     Plan to monitor hemovac in place and output amount/appearance. PCA pump in place at this time. Q2h pain assessments in place to prevent over-sedation/medication. Patient encouraged to ambulate with staff once fully awake and present.     Problem: Pain - Standard  Goal: Alleviation of pain or a reduction in pain to the patient’s comfort goal  Outcome: Progressing       Patient is not progressing towards the following goals:

## 2025-06-02 NOTE — PROGRESS NOTES
Pt arrived on [facility] with PACU RN Violet. Pt Aox4, on 1L NC. Patient resting in [facility] with soft cervical collar in place.     Patient ambulated from [facility] to bed, 5 feet using FWW.     Post-Op vitals initiated.     Call light given to patient. Educated to use the red button on call light to notify staff of needs/ assistance.     4 Eyes Skin Assessment Completed by Kait RN and GABRIEL Villa.    Skin assessment is primarily focused on high risk bony prominences. Pay special attention to skin beneath and around medical devices, high risk bony prominences, skin to skin areas and areas where the patient lacks sensation to feel pain and areas where the patient previously had breakdown.     Head (Occipital):  WDL   Ears (Under Medical Devices): WDL   Nose (Under Medical Devices): WDL   Mouth:  WDL   Neck: Incision, Surgical dressing CDI, soft cervical collar in place   Breast/Chest:  WDL   Shoulder Blades:  WDL   Spine:   WDL   (R) Arm/Elbow/Hand: WDL   (L) Arm/Elbow/Hand: WDL   Abdomen: WDL   Pannus/Groin:  WDL   Sacrum/Coccyx:   Red and Blanching   (R) Ischial Tuberosity (Sit Bones):  WDL   (L) Ischial Tuberosity (Sit Bones):  WDL   (R) Leg:  Scab   (L) Leg:  Scab   (R) Heel:  Red and Blanching   (R) Foot/Toe: WDL   (L) Heel: Red and Blanching   (L) Foot/Toe:  WDL       DEVICES IN USE:   Respiratory Devices:  Nasal cannula  Feeding Devices:  N/A   Lines & BP Monitoring Devices:  Peripheral IV, BP cuff, and Pulse ox    Orthopedic Devices:  Soft cervical collar  Miscellaneous Devices:  Drains    PROTOCOL INTERVENTIONS:   Standard/Trauma Bed:  Already in place  Nasal Cannula with Gray Foams:  Already in place    WOUND PHOTOS:   N/A no wounds identified    WOUND CONSULT:   N/A, no advanced wound care needs identified

## 2025-06-02 NOTE — ANESTHESIA PREPROCEDURE EVALUATION
Case: [id number] Date/Time: 06/02/25 0645    Procedure: C4/5 anterior cervical decompression and instrumented fusion - Time: 1.5hrs  Cpt Code: 80920, 94038, 39019, 15451    Diagnosis: Cervical stenosis of spine [M48.02]    Pre-op diagnosis: Cervical stenosis of spine [M48.02]    Location: [place] [address] / SURGERY [facility]    Surgeons: Bisi Campos M.D.            Relevant Problems   PULMONARY   (positive) Asthma   (positive) Moderate persistent asthma with acute exacerbation      ENDO   (positive) Acquired hypothyroidism   (positive) Other specified hypothyroidism      Other   (positive) Cervical stenosis of spine   (positive) Osteoarthritis of right shoulder region       Physical Exam    Airway   Mallampati: II  TM distance: >3 FB  Neck ROM: full       Cardiovascular - normal exam  Rhythm: regular  Rate: normal    (-) murmur     Dental - normal exam           Pulmonary - normal exam   Abdominal    Neurological - normal exam               Anesthesia Plan    ASA 2       Plan - general       Airway plan will be ETT    (Ancef well tolerated in 2022.  Sensory/motor monitoring.)      Induction: intravenous    Postoperative Plan: Postoperative administration of opioids is intended.    Pertinent diagnostic labs and testing reviewed    Informed Consent:    Anesthetic plan and risks discussed with patient.    Use of blood products discussed with: patient whom consented to blood products.

## 2025-06-02 NOTE — PROGRESS NOTES
Virtual Nurse rounding and admission profile complete. Designated caregiver, unit clerk notified to bring CASSIDY.     Round Needs: No needs at this time.

## 2025-06-02 NOTE — PROGRESS NOTES
UPDATE TO HISTORY AND PHYSICAL    I met with patient and any family members in preop. Again discussed planned surgery. I went over the risks, benefits and alternatives of the surgery in the office visit. I had discussed any off label use of products. The patient had been given literature to read about the procedure in person and via email if able and had access to the office note via the patient portal. I Answered any questions remaining from prior visits.     There was no change to my last H and P (it may be labelled a progress note or a H and P in EPIC).   The note was made by either myself, or my PAs Carlitos Webster or Ronny May but is signed by me, if it was done by my physician assistant.   I verify it is correct and has my co-signature.    Bisi Campos MD PhD KELSEY

## 2025-06-02 NOTE — ANESTHESIA PROCEDURE NOTES
Airway    Date/Time: 6/2/2025 7:04 AM    Performed by: Ramin Ivey M.D.  Authorized by: Ramin Ivey M.D.    Location:  OR  Urgency:  Elective  Indications for Airway Management:  Anesthesia      Spontaneous Ventilation: absent    Sedation Level:  Deep  Preoxygenated: Yes    Patient Position:  Sniffing  Mask Difficulty Assessment:  1 - vent by mask  Final Airway Type:  Endotracheal airway  Final Endotracheal Airway:  ETT  Cuffed: Yes    Technique Used for Successful ETT Placement:  Direct laryngoscopy    Insertion Site:  Oral  Blade Type:  Glide  Laryngoscope Blade/Videolaryngoscope Blade Size:  3  ETT Size (mm):  6.5  Measured from:  Lips  ETT to Lips (cm):  21  Placement Verified by: auscultation and capnometry    Cormack-Lehane Classification:  Grade I - full view of glottis  Number of Attempts at Approach:  1

## 2025-06-03 VITALS
SYSTOLIC BLOOD PRESSURE: 146 MMHG | WEIGHT: 121.69 LBS | RESPIRATION RATE: 15 BRPM | DIASTOLIC BLOOD PRESSURE: 61 MMHG | HEIGHT: 60 IN | BODY MASS INDEX: 23.89 KG/M2 | OXYGEN SATURATION: 96 % | TEMPERATURE: 97.7 F | HEART RATE: 65 BPM

## 2025-06-03 LAB
ANION GAP SERPL CALC-SCNC: 12 MMOL/L (ref 7–16)
APTT PPP: 25.9 SEC (ref 24.7–36)
BUN SERPL-MCNC: 15 MG/DL (ref 8–22)
CALCIUM SERPL-MCNC: 9.1 MG/DL (ref 8.5–10.5)
CHLORIDE SERPL-SCNC: 107 MMOL/L (ref 96–112)
CO2 SERPL-SCNC: 22 MMOL/L (ref 20–33)
CREAT SERPL-MCNC: 1.08 MG/DL (ref 0.5–1.4)
ERYTHROCYTE [DISTWIDTH] IN BLOOD BY AUTOMATED COUNT: 47.8 FL (ref 35.9–50)
GFR SERPLBLD CREATININE-BSD FMLA CKD-EPI: 51 ML/MIN/1.73 M 2
GLUCOSE SERPL-MCNC: 173 MG/DL (ref 65–99)
HCT VFR BLD AUTO: 35.3 % (ref 37–47)
HGB BLD-MCNC: 11.4 G/DL (ref 12–16)
INR PPP: 1.09 (ref 0.87–1.13)
MCH RBC QN AUTO: 30.7 PG (ref 27–33)
MCHC RBC AUTO-ENTMCNC: 32.3 G/DL (ref 32.2–35.5)
MCV RBC AUTO: 95.1 FL (ref 81.4–97.8)
PLATELET # BLD AUTO: 226 K/UL (ref 164–446)
PMV BLD AUTO: 10.7 FL (ref 9–12.9)
POTASSIUM SERPL-SCNC: 5.3 MMOL/L (ref 3.6–5.5)
PROTHROMBIN TIME: 14.1 SEC (ref 12–14.6)
RBC # BLD AUTO: 3.71 M/UL (ref 4.2–5.4)
SODIUM SERPL-SCNC: 141 MMOL/L (ref 135–145)
WBC # BLD AUTO: 7.7 K/UL (ref 4.8–10.8)

## 2025-06-03 PROCEDURE — 96376 TX/PRO/DX INJ SAME DRUG ADON: CPT | Mod: XU

## 2025-06-03 PROCEDURE — 85610 PROTHROMBIN TIME: CPT

## 2025-06-03 PROCEDURE — 80048 BASIC METABOLIC PNL TOTAL CA: CPT

## 2025-06-03 PROCEDURE — 97535 SELF CARE MNGMENT TRAINING: CPT

## 2025-06-03 PROCEDURE — 85730 THROMBOPLASTIN TIME PARTIAL: CPT

## 2025-06-03 PROCEDURE — 99024 POSTOP FOLLOW-UP VISIT: CPT | Performed by: PHYSICIAN ASSISTANT

## 2025-06-03 PROCEDURE — 85027 COMPLETE CBC AUTOMATED: CPT

## 2025-06-03 PROCEDURE — 97166 OT EVAL MOD COMPLEX 45 MIN: CPT

## 2025-06-03 PROCEDURE — A9270 NON-COVERED ITEM OR SERVICE: HCPCS | Performed by: PHYSICIAN ASSISTANT

## 2025-06-03 PROCEDURE — G0378 HOSPITAL OBSERVATION PER HR: HCPCS

## 2025-06-03 PROCEDURE — 700102 HCHG RX REV CODE 250 W/ 637 OVERRIDE(OP): Performed by: PHYSICIAN ASSISTANT

## 2025-06-03 PROCEDURE — 700111 HCHG RX REV CODE 636 W/ 250 OVERRIDE (IP): Mod: JZ | Performed by: PHYSICIAN ASSISTANT

## 2025-06-03 RX ORDER — OXYCODONE HYDROCHLORIDE 5 MG/1
5 TABLET ORAL EVERY 4 HOURS PRN
Refills: 0 | Status: DISCONTINUED | OUTPATIENT
Start: 2025-06-03 | End: 2025-06-03 | Stop reason: HOSPADM

## 2025-06-03 RX ORDER — CYCLOBENZAPRINE HCL 10 MG
10 TABLET ORAL EVERY 8 HOURS PRN
Qty: 90 TABLET | Refills: 0 | Status: SHIPPED | OUTPATIENT
Start: 2025-06-03

## 2025-06-03 RX ORDER — ETHYL ALCOHOL 62 %
1 SWAB, MEDICATED TOPICAL 2 TIMES DAILY
Status: DISCONTINUED | OUTPATIENT
Start: 2025-06-03 | End: 2025-06-03 | Stop reason: HOSPADM

## 2025-06-03 RX ORDER — OXYCODONE HYDROCHLORIDE 5 MG/1
2.5 TABLET ORAL EVERY 4 HOURS PRN
Refills: 0 | Status: DISCONTINUED | OUTPATIENT
Start: 2025-06-03 | End: 2025-06-03 | Stop reason: HOSPADM

## 2025-06-03 RX ORDER — ACETAMINOPHEN 500 MG
1000 TABLET ORAL EVERY 8 HOURS
Qty: 120 TABLET | Refills: 0 | Status: SHIPPED | OUTPATIENT
Start: 2025-06-03

## 2025-06-03 RX ORDER — OXYCODONE HYDROCHLORIDE 5 MG/1
5 TABLET ORAL EVERY 6 HOURS PRN
Qty: 28 TABLET | Refills: 0 | Status: SHIPPED | OUTPATIENT
Start: 2025-06-03 | End: 2025-06-10

## 2025-06-03 RX ORDER — CEPHALEXIN 500 MG/1
500 CAPSULE ORAL 4 TIMES DAILY
Qty: 20 CAPSULE | Refills: 0 | Status: ACTIVE | OUTPATIENT
Start: 2025-06-03 | End: 2025-06-08

## 2025-06-03 RX ORDER — CEPHALEXIN 500 MG/1
500 CAPSULE ORAL 4 TIMES DAILY
Status: DISCONTINUED | OUTPATIENT
Start: 2025-06-03 | End: 2025-06-03 | Stop reason: HOSPADM

## 2025-06-03 RX ADMIN — CEPHALEXIN 500 MG: 500 CAPSULE ORAL at 08:57

## 2025-06-03 RX ADMIN — OXYBUTYNIN CHLORIDE 5 MG: 5 TABLET ORAL at 05:27

## 2025-06-03 RX ADMIN — LEVOTHYROXINE SODIUM 50 MCG: 0.05 TABLET ORAL at 05:27

## 2025-06-03 RX ADMIN — Medication 1 APPLICATOR: at 05:27

## 2025-06-03 RX ADMIN — DEXAMETHASONE SODIUM PHOSPHATE 4 MG: 4 INJECTION, SOLUTION INTRA-ARTICULAR; INTRALESIONAL; INTRAMUSCULAR; INTRAVENOUS; SOFT TISSUE at 02:08

## 2025-06-03 RX ADMIN — ACETAMINOPHEN 1000 MG: 500 TABLET ORAL at 05:27

## 2025-06-03 RX ADMIN — DOCUSATE SODIUM 100 MG: 100 CAPSULE, LIQUID FILLED ORAL at 05:27

## 2025-06-03 ASSESSMENT — COGNITIVE AND FUNCTIONAL STATUS - GENERAL
DRESSING REGULAR LOWER BODY CLOTHING: A LITTLE
DRESSING REGULAR UPPER BODY CLOTHING: A LITTLE
DAILY ACTIVITIY SCORE: 19
TOILETING: A LITTLE
PERSONAL GROOMING: A LITTLE
HELP NEEDED FOR BATHING: A LITTLE
SUGGESTED CMS G CODE MODIFIER DAILY ACTIVITY: CK

## 2025-06-03 ASSESSMENT — ACTIVITIES OF DAILY LIVING (ADL): TOILETING: INDEPENDENT

## 2025-06-03 ASSESSMENT — PAIN DESCRIPTION - PAIN TYPE: TYPE: ACUTE PAIN;SURGICAL PAIN

## 2025-06-03 NOTE — DISCHARGE PLANNING
CM was consulted for 6 clicks of 18 however, no CM needs were identified during this admission. PT/OT cleared pt for DC home. Anticipate no CM needs at this time.

## 2025-06-03 NOTE — THERAPY
Occupational Therapy   Initial Evaluation     Patient Name:NAME@  Age:  84 y.o., Sex:  female  Medical Record #:MRN@  Today's Date: 6/3/2025     Precautions  Medical: Fall Risk  Orthopedic: Cervical Collar (c-collar AAT except showers/meals)  Surgical: Spinal- Cervical    Assessment  Patient is an 84 y.o. female POD #1 C4-5 ACDF. Additional factors influencing patient status / progress: PMHx includes cervical spine stenosis, cervical radiculopathy, osteopenia, amnestic MCI, osteoarthritis, asthma, acquired hypothyroidism. During OT eval, pt was receptive to all education and training provided for spinal precautions in the context of ADLs, IADLs, transfers and related functional mobility; handout provided and reviewed. Pt demo'd good tailor sit for distal LBD and performed toileting, standing g/h and related mobility/transfers with SBA-SPV. Pt initially used the FWW then progressed to no AD. Pt needed Raúl to don soft c-collar and verbalized understanding of donning and reports her  can assist. No further acute OT needs.     Plan    Occupational Therapy Initial Treatment Plan   Duration: Evaluation only    DC Equipment Recommendations: None  Discharge Recommendations: Anticipate that the patient will have no further occupational therapy needs after discharge from the hospital     Subjective    Pt agreeable to OT eval.      Objective     06/03/25 0849   Initial Contact Note    Initial Contact Note Order Received and Verified, Evaluation Only - Patient Does Not Require Further Acute Occupational Therapy at this Time.  However, May Benefit from Post Acute Therapy for Higher Level Functional Deficits.   Prior Living Situation   Prior Services Home-Independent   Housing / Facility 1 Story House   Steps Into Home 1   Steps In Home 0   Bathroom Set up Walk In Shower;Bathtub / Shower Combination;Built-In Shower Chair  (uses WIS)   Equipment Owned 4-Wheel Walker;Single Point Cane;Quad Cane;Tub / Shower Seat;Hand Held  Shower;Reacher   Lives with - Patient's Self Care Capacity Spouse   Comments Pt reports her spouse can assist if needed   Prior Level of ADL Function   Self Feeding Independent   Grooming / Hygiene Independent   Bathing Independent   Dressing Independent   Toileting Independent   Prior Level of IADL Function   Medication Management Independent   Laundry Independent   Kitchen Mobility Independent   Finances Independent   Home Management Independent   Shopping Independent   Prior Level Of Mobility Independent Without Device in Community   Driving / Transportation Driving Independent   Occupation (Pre-Hospital Vocational) Retired Due To Age   Precautions   Medical Fall Risk   Orthopedic Cervical Collar  (c-collar AAT except showers/meals)   Surgical Spinal- Cervical   Vitals   Room Air Oximetry 94   O2 (LPM) 0   O2 Delivery Device None - Room Air   Pain   Intervention Declines   Non Verbal Descriptors   Non Verbal Scale  Calm   Cognition    Cognition / Consciousness WDL   Level of Consciousness Alert   Comments Very pleasant and cooperative, receptive to education   Active ROM Upper Body   Active ROM Upper Body  WDL   Strength Upper Body   Upper Body Strength  WDL   Sensation Upper Body   Upper Extremity Sensation  Not Tested   Upper Body Muscle Tone   Upper Body Muscle Tone  WDL   Neurological Concerns   Neurological Concerns No   Coordination Upper Body   Coordination WDL   Balance Assessment   Sitting Balance (Static) Good   Sitting Balance (Dynamic) Good   Standing Balance (Static) Fair   Standing Balance (Dynamic) Fair -   Weight Shift Sitting Good   Weight Shift Standing Good   Comments FWW then no AD   Bed Mobility    Supine to Sit Supervised   Scooting Supervised   Rolling Supervised   Comments HOB flat, log roll   ADL Assessment   Grooming Supervision;Standing  (hand hygiene, oral care at sink)   Upper Body Dressing Minimal Assist  (education on soft c-collar mgmt and donning/doffing shirts)   Lower Body  Dressing   (demo's good tailor sit for distal mgmt)   Toileting Supervision   How much help from another person does the patient currently need...   Putting on and taking off regular lower body clothing? 3   Bathing (including washing, rinsing, and drying)? 3   Toileting, which includes using a toilet, bedpan, or urinal? 3   Putting on and taking off regular upper body clothing? 3   Taking care of personal grooming such as brushing teeth? 3   Eating meals? 4   6 Clicks Daily Activity Score 19   Functional Mobility   Sit to Stand Standby Assist   Bed, Chair, Wheelchair Transfer Standby Assist   Toilet Transfers Standby Assist   Transfer Method Stand Step   Mobility in room for ADLs   Activity Tolerance   Sitting in Chair post   Sitting Edge of Bed 5 min   Standing 5 min   Comments functional   Education Group   Education Provided Spinal Precautions   Role of Occupational Therapist Patient Response Patient;Acceptance;Explanation;Verbal Demonstration   Spinal Precautions Patient Response Patient;Acceptance;Explanation;Demonstration;Handout;Verbal Demonstration;Action Demonstration   Brace Wear & Care Patient Response Patient;Acceptance;Explanation;Demonstration;Handout;Verbal Demonstration;Action Demonstration   Occupational Therapy Initial Treatment Plan    Duration Evaluation only   Problem List   Problem List Decreased Active Daily Living Skills;Decreased Homemaking Skills;Decreased Functional Mobility;Decreased Activity Tolerance;Impaired Postural Control / Balance   Anticipated Discharge Equipment and Recommendations   DC Equipment Recommendations None   Discharge Recommendations Anticipate that the patient will have no further occupational therapy needs after discharge from the hospital   Interdisciplinary Plan of Care Collaboration   IDT Collaboration with  Nursing   Patient Position at End of Therapy Seated;Chair Alarm On;Call Light within Reach;Tray Table within Reach;Phone within Reach   Collaboration  Comments RN updated   Session Information   Date / Session Number  6/3 eval only

## 2025-06-03 NOTE — CARE PLAN
The patient is Stable - Low risk of patient condition declining or worsening    Shift Goals  Clinical Goals: Pain control, mobility, reorientation  Patient Goals: Pain control  Family Goals: N/A    Progress made toward(s) clinical / shift goals:        Problem: Knowledge Deficit - Standard  Goal: Patient and family/care givers will demonstrate understanding of plan of care, disease process/condition, diagnostic tests and medications  Outcome: Progressing     Problem: Pain - Standard  Goal: Alleviation of pain or a reduction in pain to the patient’s comfort goal  Outcome: Progressing       Patient is not progressing towards the following goals:

## 2025-06-03 NOTE — PROGRESS NOTES
"Neurosurgery  POD# 1   Seen with Dr. Campos  Ambulating  Voiding  Tolerating oral medications  Denies nausea, vomiting  Pain controlled on current medication regimen  Arm symptoms improved    Objective:  /59   Pulse 71   Temp 36.7 °C (98.1 °F) (Temporal)   Resp 15   Ht 1.524 m (5')   Wt 55.2 kg (121 lb 11.1 oz)   SpO2 96%     Intake/Output Summary (Last 24 hours) at 6/3/2025 0729  Last data filed at 6/3/2025 0400  Gross per 24 hour   Intake 2000 ml   Output 15 ml   Net 1985 ml       Recent Labs     06/03/25  0027   WBC 7.7   RBC 3.71*   HEMOGLOBIN 11.4*   HEMATOCRIT 35.3*   MCV 95.1   MCH 30.7   MCHC 32.3   RDW 47.8   PLATELETCT 226   MPV 10.7     Recent Labs     06/03/25  0027   SODIUM 141   POTASSIUM 5.3   CHLORIDE 107   CO2 22   GLUCOSE 173*   BUN 15     Recent Labs     06/03/25  0027   APTT 25.9   INR 1.09     VSS  LS  Hemovac 5cc/8hr am  Surgical incision clean, dry, intact, no evidence of infection  Strength:  Lower extremities are 5/5 grossly  Upper extremities are 5/5 grossly  Otherwise neurologically intact    Assessment:  Active Hospital Problems    Diagnosis     Cervical radiculopathy [M54.12]     Memory loss [R41.3]      \"a little bit\"       Cervical stenosis of spine [M48.02]     Right shoulder pain [M25.511]      constant dull ache, and positional pain.       POD#1 S/p C4-5 ACDF  Chemoprophylaxis: NO    Plan:  1. Ambulate with PT/OT as tolerated- soft collar off for meals  2. Advance diet as tolerated  3. D/c PCA, D/c IV fluids, D/c hemovac  4. Home this morning, scripts sent to pharmacy, call with any issues.   "
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
## 2025-06-03 NOTE — PROGRESS NOTES
Discharge orders received.  Patient arrived to the discharge lounge.  PIV removed.  Instructions given, medications reviewed and general discharge education provided to patient.  Follow up appointments discussed.  Patient verbalized understanding of dc instructions and prescriptions.  Patient signed discharge instructions.  Patient verbalized understanding had all belongings with her.  Patient left with ride. Wished patient a speedy recovery.

## 2025-06-09 ENCOUNTER — OFFICE VISIT (OUTPATIENT)
Dept: MEDICAL GROUP | Facility: PHYSICIAN GROUP | Age: 85
End: 2025-06-09
Payer: MEDICARE

## 2025-06-09 VITALS
DIASTOLIC BLOOD PRESSURE: 62 MMHG | SYSTOLIC BLOOD PRESSURE: 112 MMHG | BODY MASS INDEX: 23.44 KG/M2 | HEIGHT: 60 IN | HEART RATE: 86 BPM | WEIGHT: 119.4 LBS | OXYGEN SATURATION: 94 % | RESPIRATION RATE: 15 BRPM | TEMPERATURE: 98.8 F

## 2025-06-09 DIAGNOSIS — Z09 HOSPITAL DISCHARGE FOLLOW-UP: Primary | ICD-10-CM

## 2025-06-09 DIAGNOSIS — M19.011 PRIMARY OSTEOARTHRITIS OF RIGHT SHOULDER: ICD-10-CM

## 2025-06-09 DIAGNOSIS — M54.12 CERVICAL RADICULOPATHY: ICD-10-CM

## 2025-06-09 DIAGNOSIS — G31.84 AMNESTIC MCI (MILD COGNITIVE IMPAIRMENT WITH MEMORY LOSS): ICD-10-CM

## 2025-06-09 DIAGNOSIS — M48.02 CERVICAL STENOSIS OF SPINE: ICD-10-CM

## 2025-06-09 PROCEDURE — 99213 OFFICE O/P EST LOW 20 MIN: CPT | Performed by: FAMILY MEDICINE

## 2025-06-09 PROCEDURE — 3078F DIAST BP <80 MM HG: CPT | Performed by: FAMILY MEDICINE

## 2025-06-09 PROCEDURE — 3074F SYST BP LT 130 MM HG: CPT | Performed by: FAMILY MEDICINE

## 2025-06-09 ASSESSMENT — FIBROSIS 4 INDEX: FIB4 SCORE: 2.95

## 2025-06-09 NOTE — PROGRESS NOTES
Verbal consent was acquired by the patient to use [employer] ambient listening note generation during this visit     Subjective:     Cristin Elizalde is a 84 y.o. female who presents for Hospital Follow-up.    Transitional Care Management     Admission: 6/2/25  Discharge: 6/3/25    Medications:tylenol, keflex, flexeril, oxycodone  Procedures: C4/5 anterior cervical decompression and instrumented fusion       HPI:   Recently hospitalized for spinal stenosis.    History of Present Illness  The patient presents for evaluation of right shoulder pain.    Right Shoulder Pain  - Reports a satisfactory recovery post-surgery, with the exception of persistent tenderness in her right shoulder.  - Not experiencing any associated numbness or tingling in her RUE  - Describes the pain as stiffness, which she attributes to the recent surgical procedure.  - No signs of inflammation such as swelling or redness.  - Has a scheduled follow-up appointment with Dr. Campos on 06/16/2025, who has advised a 6-week wait before initiating physical therapy.    Memory Concerns  - Has an appointment with the neurologist on 06/11/2025 for her memory.  - They want to check to make sure that the anesthesia did not affect her memory or make it any worse.  - Does not feel like it is any worse and there have been no major changes.    Supplemental information: PAST SURGICAL HISTORY: She reports having had neck surgery with three vertebrae fused and a cage placed in her neck.       Current medicines (including reconciliation performed today)  Current Outpatient Medications   Medication Sig Dispense Refill    acetaminophen (TYLENOL) 500 MG Tab Take 2 Tablets by mouth every 8 hours. 120 Tablet 0    cyclobenzaprine (FLEXERIL) 10 MG Tab Take 1 Tablet by mouth every 8 hours as needed for Muscle Spasms. 90 Tablet 0    trospium (SANCTURA) 20 MG Tab Take 20 mg by mouth 2 times a day.      CALCIUM PO Take 1 Tablet by mouth every day.      Cyanocobalamin (VITAMIN  B12 PO) Take 1 Capsule by mouth every day.      albuterol 108 (90 Base) MCG/ACT Aero Soln inhalation aerosol Inhale 2 Puffs every four hours as needed for Shortness of Breath. 8.5 g 3    donepezil (ARICEPT) 10 MG tablet Take 1/2 tablet orally before bedtime for 1 month and a 1 tablet a day 90 Tablet 3    levothyroxine (SYNTHROID) 50 MCG Tab Take 1 Tablet by mouth every morning on an empty stomach. 90 Tablet 3    traZODone (DESYREL) 50 MG Tab Take 1 Tablet by mouth at bedtime. 90 Tablet 3    vitamin D (CHOLECALCIFEROL) 1000 UNIT Tab Take 1,000 Units by mouth every day.      MULTIVITAMINS PO Take 1 Tablet by mouth every day.      magnesium citrate Solution Drink 1/2 bottle over 10 minutes followed by clear liquids. 296 mL 0    polyethylene glycol 3350 (MIRALAX) 17 GM/SCOOP Powder Take 17 g by mouth 2 times a day. Discontinue once regular BMs resume. 510 g 0     No current facility-administered medications for this visit.       Allergies:   Clindamycin, Penicillins, Boniva [ibandronic acid], Fosamax, and Ibuprofen    Social History     Tobacco Use    Smoking status: Never    Smokeless tobacco: Never   Vaping Use    Vaping status: Never Used   Substance Use Topics    Alcohol use: Yes     Alcohol/week: 0.6 oz     Types: 1 Glasses of wine per week     Comment: Maybe once glass per month    Drug use: Never         Objective:     Vitals:    06/09/25 1415   BP: 112/62   BP Location: Right arm   Patient Position: Sitting   BP Cuff Size: Adult   Pulse: 86   Resp: 15   Temp: 37.1 °C (98.8 °F)   TempSrc: Temporal   SpO2: 94%   Weight: 54.2 kg (119 lb 6.4 oz)   Height: 1.524 m (5')     Body mass index is 23.32 kg/m².    Physical Exam:  Physical Exam  Constitutional:       Appearance: Normal appearance.   HENT:      Head: Normocephalic and atraumatic.      Nose: Nose normal.      Mouth/Throat:      Mouth: Mucous membranes are moist.   Eyes:      Extraocular Movements: Extraocular movements intact.      Conjunctiva/sclera:  Conjunctivae normal.      Pupils: Pupils are equal, round, and reactive to light.   Neck:        Comments: I C/D/I  Cardiovascular:      Rate and Rhythm: Normal rate and regular rhythm.   Pulmonary:      Effort: Pulmonary effort is normal.      Breath sounds: Normal breath sounds.   Abdominal:      General: Abdomen is flat. Bowel sounds are normal.   Musculoskeletal:      Right shoulder: Tenderness present. Decreased range of motion. Decreased strength.      Cervical back: Neck supple. Muscular tenderness present. Decreased range of motion.   Skin:     General: Skin is warm and dry.   Neurological:      General: No focal deficit present.      Mental Status: She is alert. Mental status is at baseline.   Psychiatric:         Mood and Affect: Mood normal.          Assessment and Plan:   1. Hospital discharge follow-up (Primary)  2. Cervical radiculopathy  3. Cervical stenosis of spine    - Chart and discharge summary were reviewed.   - Hospitalization and results reviewed with patient.   - Medications reviewed including instructions regarding high risk medications, dosing and side effects.    - As patient is doing well pain wise, will not refill her narcotic pain medication    - Continue tylenol and flexeril as needed  - Recommended Services: No services needed at this time  - Advance directive/POLST on file?  No     4. Amnestic MCI (mild cognitive impairment with memory loss)    - Chronic  - Stable and unchanged following her surgery per her   - Follow up with Neurology as instructed    - prior records reviewed  - Continue donepezil 10    5. Primary osteoarthritis of right shoulder    - Chronic  - Chart and discharge summary were reviewed.   - Hospitalization and results reviewed with patient.   - Suspect some of her pain post-op is related to positioning during surgery and that will improve  - As this is chronic and she denies any new weakness or paraesthesias do not believe she is having any SE from the  surgery  - Follow up with Ortho as instructed     Follow-up:Return if symptoms worsen or fail to improve.    Face-to-face transitional care management services with HIGH (today's visit is within days post discharge & LACE+ score 59+) medical decision complexity were provided.

## 2025-06-11 ENCOUNTER — APPOINTMENT (OUTPATIENT)
Dept: RADIOLOGY | Facility: IMAGING CENTER | Age: 85
End: 2025-06-11
Attending: STUDENT IN AN ORGANIZED HEALTH CARE EDUCATION/TRAINING PROGRAM
Payer: MEDICARE

## 2025-06-11 ENCOUNTER — OFFICE VISIT (OUTPATIENT)
Dept: NEUROLOGY | Facility: MEDICAL CENTER | Age: 85
End: 2025-06-11
Attending: PSYCHIATRY & NEUROLOGY
Payer: MEDICARE

## 2025-06-11 ENCOUNTER — OFFICE VISIT (OUTPATIENT)
Dept: URGENT CARE | Facility: PHYSICIAN GROUP | Age: 85
End: 2025-06-11
Payer: MEDICARE

## 2025-06-11 VITALS
HEART RATE: 101 BPM | BODY MASS INDEX: 23.56 KG/M2 | OXYGEN SATURATION: 95 % | RESPIRATION RATE: 16 BRPM | SYSTOLIC BLOOD PRESSURE: 142 MMHG | DIASTOLIC BLOOD PRESSURE: 84 MMHG | HEIGHT: 60 IN | TEMPERATURE: 98.4 F | WEIGHT: 120 LBS

## 2025-06-11 VITALS
HEART RATE: 92 BPM | WEIGHT: 119.49 LBS | SYSTOLIC BLOOD PRESSURE: 104 MMHG | TEMPERATURE: 98.1 F | DIASTOLIC BLOOD PRESSURE: 64 MMHG | HEIGHT: 60 IN | OXYGEN SATURATION: 95 % | RESPIRATION RATE: 16 BRPM | BODY MASS INDEX: 23.46 KG/M2

## 2025-06-11 DIAGNOSIS — K59.03 DRUG-INDUCED CONSTIPATION: ICD-10-CM

## 2025-06-11 DIAGNOSIS — Z98.890 H/O CERVICAL SPINE SURGERY: ICD-10-CM

## 2025-06-11 DIAGNOSIS — G31.84 AMNESTIC MCI (MILD COGNITIVE IMPAIRMENT WITH MEMORY LOSS): Primary | ICD-10-CM

## 2025-06-11 DIAGNOSIS — K59.00 CONSTIPATION, UNSPECIFIED CONSTIPATION TYPE: Primary | ICD-10-CM

## 2025-06-11 PROCEDURE — 3078F DIAST BP <80 MM HG: CPT | Performed by: PSYCHIATRY & NEUROLOGY

## 2025-06-11 PROCEDURE — 74018 RADEX ABDOMEN 1 VIEW: CPT | Mod: TC | Performed by: RADIOLOGY

## 2025-06-11 PROCEDURE — 3077F SYST BP >= 140 MM HG: CPT | Performed by: STUDENT IN AN ORGANIZED HEALTH CARE EDUCATION/TRAINING PROGRAM

## 2025-06-11 PROCEDURE — 99214 OFFICE O/P EST MOD 30 MIN: CPT | Performed by: STUDENT IN AN ORGANIZED HEALTH CARE EDUCATION/TRAINING PROGRAM

## 2025-06-11 PROCEDURE — 3074F SYST BP LT 130 MM HG: CPT | Performed by: PSYCHIATRY & NEUROLOGY

## 2025-06-11 PROCEDURE — 99215 OFFICE O/P EST HI 40 MIN: CPT | Performed by: PSYCHIATRY & NEUROLOGY

## 2025-06-11 PROCEDURE — 99213 OFFICE O/P EST LOW 20 MIN: CPT | Performed by: PSYCHIATRY & NEUROLOGY

## 2025-06-11 PROCEDURE — 3079F DIAST BP 80-89 MM HG: CPT | Performed by: STUDENT IN AN ORGANIZED HEALTH CARE EDUCATION/TRAINING PROGRAM

## 2025-06-11 RX ORDER — POLYETHYLENE GLYCOL 3350 17 G/17G
17 POWDER, FOR SOLUTION ORAL 2 TIMES DAILY
Qty: 510 G | Refills: 0 | Status: SHIPPED | OUTPATIENT
Start: 2025-06-11

## 2025-06-11 ASSESSMENT — PATIENT HEALTH QUESTIONNAIRE - PHQ9
CLINICAL INTERPRETATION OF PHQ2 SCORE: 1
SUM OF ALL RESPONSES TO PHQ QUESTIONS 1-9: 1
5. POOR APPETITE OR OVEREATING: 0 - NOT AT ALL

## 2025-06-11 ASSESSMENT — FIBROSIS 4 INDEX
FIB4 SCORE: 2.95
FIB4 SCORE: 2.95

## 2025-06-11 NOTE — PROGRESS NOTES
"Neuro follow visit - accompanied by her .     Reason: Amnestic Mild Cognitive Impairment.     Last visit me in 12/2024.      She was also evaluated by Dr. Vikash Arboleda PhD (Neuropsychologist at [facility]):     ===================================================  \"Impression: The patient's cognitive profile revealed impairments in aspects of memory, with particular difficulty in memory retrieval. 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 Etiologically, her improvement in memory performance with recognition cues on most measures was indicative of executive dysfunction interfering with retrieval, consistent with what is typically seen in patients with vascular cognitive impairment. This is corroborated by her brain MRI findings of moderate chronic microvascular ischemic disease, though she does not have numerous vascular risk factors in her medical history. Her poor rote verbal memory performance, flat learning curves, and low retention rates on all memory measures remain somewhat concerning for incipient Alzheimer's disease (AD). However, AD is less likely at this time given her intact language semantic processing, story recognition memory, and visual recognition memory. Additional possible contributing factors to her cognitive difficulties include sleep problems, daytime fatigue, and current stressors. This evaluation may serve as a baseline for longitudinal tracking. \"   ==========================================================     Cristin Lam Elizalde 84  y.o.  right handed female who is originally from the [place] area and had 1 year of college education. She was an  for many years and moved to [facility] x 20 years ([place]) and then to [facility] and/or [place] since 1984.      Since last visit with me and recently, Amrita had an anterior decompression with a plate due " "to a chronic cervical radiculopathy (right sided)> she was having difficulty lifting her right arm up and this  has improved.  Sensation of the limbs x 4 has remained good post surgery.    In addition, Cristin  has noticed for the last 3 to 4  years that she will infrequently go into a room and not realize or remember what she went in to get or do but \"as soon as I turn around I can usually recognize/remember what I wanted to do or get.\" This seems to be more frequently occurring in over the last 12 months.      has mainly noticed that the main change since 12/2024  is that Cristin tends to forget what is said to her within 5-10 minutes of being told something.     She is not however repeating herself over and over again in the last 3-6 months.    She still has  to write down information in general in the last 12 months> mainly her appointments.     Speech-language seems well maintained to Cristin without having lucia or consistent word retrieval issues.      has noticed that Cristin that she has more problems with the day of the week and keeping up with current events. She will ask the question and within 30-60 minutes of stating the same information and this has been happening for over 12 months.     When she drinks alcohol she will repeat sentences and this tends to be only when drinking.     No history of concussion(s), stroke or TIA events, seizure type episodes known,reported (in the problem list).    Amrita walks well without declining subjective balance nor any falls/near falls in the last 6 months.     No delusions,paranoia,hallucinations, behavioral or personality changes in the last 3 months.     Sleep: averages 7-8 hours most nights of the week in the recent months. Denies REM Sleep Behavioral type symptoms such as vivid dreaming.  Rare awakenings to urinate (at most 2 times per night in the recent few months). Denies Excessive Day Time Sleepiness or need for daily or frequent napping in the recent " few months.    No involuntary movements of the body or limbs in the recent months.     Denies orthostatic dizziness or lucia faintness events in the last 3 months.     There has been no problems swallowing in the recent months.      No significant tobacco use in adult life.  Rarely drinks alcohol in adult life but like a marguirita if she goes to a [employer].      Family Hx:  1 brother (age 82)- memory decline starting about 4 years and he is starting to need help making a decision.     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    Vitamin D deficiency 11/18/2016    Osteopenia of multiple sites 05/04/2016    Other specified hypothyroidism 05/04/2016       Past medical history:   Past Medical History[1]    Past surgical history:   Past Surgical History[2]      Social history:   Social History     Socioeconomic History    Marital status:      Spouse name: Not on file    Number of children: Not on file    Years of education: Not on file    Highest education level: Some college, no degree   Occupational History    Not on file   Tobacco Use    Smoking status: Never    Smokeless tobacco: Never   Vaping Use    Vaping status: Never Used   Substance and Sexual Activity    Alcohol use: Yes     Alcohol/week: 0.6 oz     Types: 1 Glasses of wine per week     Comment: Maybe once glass per month    Drug use: Never    Sexual activity: Yes     Partners:  Male     Comment:    Other Topics Concern    Not on file   Social History Narrative    Not on file     Social Drivers of Health     Financial Resource Strain: Low Risk  (7/11/2022)    Overall Financial Resource Strain (CARDIA)     Difficulty of Paying Living Expenses: Not hard at all   Food Insecurity: No Food Insecurity (6/2/2025)    Hunger Vital Sign     Worried About Running Out of Food in the Last Year: Never true     Ran Out of Food in the Last Year: Never true   Transportation Needs: No Transportation Needs (6/2/2025)    PRAPARE - Transportation     Lack of Transportation (Medical): No     Lack of Transportation (Non-Medical): No   Physical Activity: Insufficiently Active (7/11/2022)    Exercise Vital Sign     Days of Exercise per Week: 4 days     Minutes of Exercise per Session: 10 min   Stress: No Stress Concern Present (7/11/2022)    [employer] [place] [employer] - Occupational Stress Questionnaire     Feeling of Stress : Not at all   Social Connections: Socially Integrated (7/11/2022)    Social Connection and Isolation Panel [NHANES]     Frequency of Communication with Friends and Family: Three times a week     Frequency of Social Gatherings with Friends and Family: Once a week     Attends [employer] Services: 1 to 4 times per year     Active Member of Clubs or Organizations: Yes     Attends Club or Organization Meetings: 1 to 4 times per year     Marital Status:    Intimate Partner Violence: Not At Risk (6/2/2025)    Humiliation, Afraid, Rape, and Kick questionnaire     Fear of Current or Ex-Partner: No     Emotionally Abused: No     Physically Abused: No     Sexually Abused: No   Housing Stability: Low Risk  (6/2/2025)    Housing Stability Vital Sign     Unable to Pay for Housing in the Last Year: No     Number of Times Moved in the Last Year: 0     Homeless in the Last Year: No       Family history:   Family History   Problem Relation Age of Onset    Diabetes Mother          Grandfather    Other Father 69        aneurysm    Dementia Sister     Dementia Brother          Current medications:   Current Outpatient Medications   Medication    acetaminophen (TYLENOL) 500 MG Tab    trospium (SANCTURA) 20 MG Tab    CALCIUM PO    Cyanocobalamin (VITAMIN B12 PO)    albuterol 108 (90 Base) MCG/ACT Aero Soln inhalation aerosol    donepezil (ARICEPT) 10 MG tablet    levothyroxine (SYNTHROID) 50 MCG Tab    traZODone (DESYREL) 50 MG Tab    vitamin D (CHOLECALCIFEROL) 1000 UNIT Tab    MULTIVITAMINS PO    cyclobenzaprine (FLEXERIL) 10 MG Tab     No current facility-administered medications for this visit.       Medication Allergy:  Allergies[3]        Physical examination:   Vitals:    06/11/25 1032   BP: 104/64   BP Location: Right arm   Patient Position: Sitting   BP Cuff Size: Adult   Pulse: 92   Resp: 16   Temp: 36.7 °C (98.1 °F)   TempSrc: Temporal   SpO2: 95%   Weight: 54.2 kg (119 lb 7.8 oz)   Height: 1.524 m (5')       Normal cephalic atraumatic.  Left horizontal scar (well healing and no exudates)- about 2-3 inches long to left of midline.  There is full range of movement around the neck in all directions without restrictions or discrete pain evoked triggers.  No lower extremity edema.    Neurological  Exam:    Sanford Cognitive Assessment (MOCA) Version 7.1    Years of Education: 2 years [employer]    TOTAL SCORE: 20/30  (to be scanned into the MEDIA section in the E.M.R.)      Mental status: Awake, alert and fully oriented to person, place, and situation. She could not tell me the date,month (2023/2024 stated).  Normal attention and concentration.  Did not appear/act combative,irritable,anxious,paranoid/delusional or aggressive to or with me.    Speech and language: Speech is fluent without errors, clear, intact to repetition, and intact to naming.     Follows 3 step motor commands in sequence without significant delay and correctly.    Cranial nerve exam:  II: Pupils are equally round  "and reactive to light. Visual fields are intact by confrontation.  III, IV, VI: EOMI, no diplopia, no ptosis.  V: Sensation to light touch is normal over V1-3 distributions bilaterally.  .  VII: Facial movements are symmetrical. There is no facial droop. .  VIII: Hearing intact to soft speech and finger rub bilaterally  IX: Palate elevates symmetrically, uvula is midline. Dysarthria is not present.  XI: Shoulder shrug are symmetrical and strong.   XII: Tongue protrudes midline.      Motor exam:  Muscle tone is normal in all 4 limbs. and No abnormal movements appreciated.    There is no atrophy of the right arm/hand vs left arm/hand.    Muscle strength:    Neck Flexors/Extensors: 5/5       Right  Left  Deltoid   5/5  5/5      Biceps   5/5  5/5  Triceps              5/5  5/5   Wrist extensors 5/5  5/5  Wrist flexors  5/5  5/5     5/5  5/5  Interossei  5/5  5/5  Thenar (APB)  5/5  5/5   Hip flexors  5/5  5/5  Quadriceps  5/5  5/5    Hamstrings  5/5  5/5  Dorsiflexors  5/5  5/5  Plantarflexors  5/5  5/5  Toe extension  5/5  5/5      Reflexes:       Right  Left  Biceps   2/4  2/4  Triceps              2/4  2/4  Brachioradialis 2/4  2/4  Knee jerk  2/4  2/4  Ankle jerk  2/4  2/4     Frontal release signs are absent    bilaterally toes are downgoing to plantar stimulation..    Coordination (finger-to-nose, heel/knee/shin, rapid alternating movements) was normal.     There was no ataxia, no tremors, and no dysmetria.     Station and gait > easily stands up from exam chair without retropulsion,veering,leaning,swaying (to either side).     No Rombergism.    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    There are no features or parkinsonism or Lewy Body Disease at this time.     Results from Dr. Arboleda ([facility] Neuropsychologist) as below: per 2023 evaluation.     \"Impression: The patient's cognitive profile revealed impairments in aspects of memory, with particular " "difficulty in memory retrieval. 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 Etiologically, her improvement in memory performance with recognition cues on most measures was indicative of executive dysfunction interfering with retrieval, consistent with what is typically seen in patients with vascular cognitive impairment. This is corroborated by her brain MRI findings of moderate chronic microvascular ischemic disease, though she does not have numerous vascular risk factors in her medical history. Her poor rote verbal memory performance, flat learning curves, and low retention rates on all memory measures remain somewhat concerning for incipient Alzheimer's disease (AD). However, AD is less likely at this time given her intact language semantic processing, story recognition memory, and visual recognition memory. Additional possible contributing factors to her cognitive difficulties include sleep problems, daytime fatigue, and current stressors. This evaluation may serve as a baseline for longitudinal tracking.  \"        MOCA score of 20 out of 30  today > see media section.  ( Cristin  clearly  has difficulties with short term memory impairments)     Functional Activity Questionnaire > 10  per  (previously was 12 in 12/2024)     Alzheimer's Disease Questionnaire of 13 today per .- previously was an 8 in 12/2024 (see media section for specifics)     There are no signs of parkinsonism at this time.     There are no features of psychosis or evolving behavior changes in the recent months.    2. C spine Anterior Fusion- done on June 2nd 2025.  Doing well at this point and wearing a soft collar for another 4 weeks. She is having more range of movement of  her right arm post surgery and no new or evolving radiculopathic pain(s) down the arms or any fixed sensory deficits of the upper limbs at " "this time.        I have performed  a history and physical exam and a directed /focused  ROS today.     Plans:     A. Brain Health topics reviewed today including some books to read about this topic.     B.  Anti amyloid IV medications reviewed ( ie, Leqembi and Kinsunla)  and decision today is  not to pursue such mediation(s) given Risk vs Benefits profile.     C. We discussed the utility of doing a Brain PET imaging at this time after discussing this test with Cristin and  will not proceed with such medication.    D. Aricept to be continued at 10 mg a day dose.    E. Precivity AD2 blood test reviewed today and information given to Amrita. At this time will hold off on doing this blood test.     F. Repeat formal neuropsychological testing with Dr. Vikash Arboleda PhD. She is in agreement to do such testing.     Total time spent today or this patient's care was 40    minutes  and included reviewing  the diagnostic workup to date (such as labs and imaging as well as interpreting such tests relevant to this patient's neurological condition),  reviewing/obtaining separately obtained history (from patient and/or accompanying    for today's neurological problem(s) ,counseling and educating the patient and family member on issues related to cognition/memory and cognitive health factors and documenting  the clinical information in the EMR.     Follow up in about 6 to 9  months or so.     Fabricio Morales MD  [facility].   [facility]             [1]   Past Medical History:  Diagnosis Date    Arthritis     Osteo    ASTHMA     rare use of inhaler    Cataract     had surgery 5-6 years ago    Hypothyroidism (acquired)     Insomnia     Memory loss     \"a little bit\"     Osteoporosis     Other neutropenia (HCC) 08/07/2018    Pain     Right shoulder pain 04/25/2022    constant dull ache, and positional pain.    Seasonal allergies    [2]   Past Surgical " History:  Procedure Laterality Date    MI ARTHRODESIS ANT INTERBODY INC DISCECTOMY, CERVICAL BELOW C2  6/2/2025    Procedure: C4/5 anterior cervical decompression and instrumented fusion;  Surgeon: Bisi Campos M.D.;  Location: [facility];  Service: Spine Neurosurgery    MI ANTERIOR INSTRUMENTATION 2-3 VERTEBRAL SEGME*  6/2/2025    Procedure: C4/5 anterior cervical decompression and instrumented fusion;  Surgeon: Bisi Campos M.D.;  Location: [facility];  Service: Spine Neurosurgery    MI INSJ BIOMCHN DEV INTERVERTEBRAL DSC SPC W/ARTHRD  6/2/2025    Procedure: C4/5 anterior cervical decompression and instrumented fusion;  Surgeon: Bisi Campos M.D.;  Location: [facility];  Service: Spine Neurosurgery    MI ALLOGRAFT FOR SPINE SURGERY ONLY MORSELIZED  6/2/2025    Procedure: C4/5 anterior cervical decompression and instrumented fusion;  Surgeon: Bisi Campos M.D.;  Location: [facility];  Service: Spine Neurosurgery    REVISION, REVERSE TOTAL ARTHROPLASTY, SHOULDER Right 05/10/2022    Procedure: Right reverse total shoulder arthroplasty, hardware removal and repairs as indicated;  Surgeon: Boston Villegas M.D.;  Location: [facility];  Service: Orthopedics    HARDWARE REMOVAL ORTHO Right 05/10/2022    Procedure: REMOVAL, HARDWARE;  Surgeon: Boston Villegas M.D.;  Location: [facility];  Service: Orthopedics    CATARACT EXTRACTION WITH IOL Bilateral 2020    SHOULDER DECOMPRESSION ARTHROSCOPIC  10/23/2008    Performed by BOSTON VILLEGAS at [facility] ORS    SHOULDER ARTHROSCOPY W/ ROTATOR CUFF REPAIR  10/23/2008    Performed by BOSTON VILLEGAS at [facility] ORS    CLAVICLE DISTAL EXCISION  10/23/2008    Performed by BOSTON VILLEGAS at [facility] ORS    SEPTOPLASTY  2008    sinus surg    CARPAL TUNNEL REL Right 2005    BUNIONECTOMY Right 1999    BLADDER SUSPENSION  1995    surg for incontinence    TUBAL LIGATION  1970    APPENDECTOMY   1952    COLONOSCOPY  1990's    SHOULDER SURGERY  5/10/220    Shoulder surgery   [3]   Allergies  Allergen Reactions    Clindamycin Hives    Penicillins Hives    Boniva [Ibandronic Acid] Unspecified     Had jaw necrosis.  Also had same reaction on Fosamax    Fosamax Unspecified     Jaw necrosis. Had same reaction with Boniva.    Ibuprofen Unspecified     PT can not remember

## 2025-06-11 NOTE — PROGRESS NOTES
Subjective:   CHIEF COMPLAINT  Chief Complaint   Patient presents with    Constipation     Since her neck surgery 6/2, has been taking OTC       HPI    History of Present Illness  Cristin Elizalde is a 84 y.o. female who presents for evaluation of constipation.    She underwent spinal surgery on 06/02/2025, with her last bowel movement occurring the day prior to the procedure. Since then, she has been experiencing constipation. Despite daily administration of stool softeners and four enemas at home, there has been no significant improvement in her condition. She reports that only water is expelled following months.  She is not experiencing any episodes of vomiting and maintains a mild appetite, although she has consciously reduced her food intake.  She has not experienced any significant abdominal pain.  She is not on any narcotics.       PSH: Appendectomy and tubal ligation.     PAST SURGICAL HISTORY:  - Appendectomy at age 10  - Tubal ligation        REVIEW OF SYSTEMS  General: no fever or chills  GI: no nausea or vomiting  See HPI for further details.    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    Vitamin D deficiency 11/18/2016    Osteopenia of multiple sites 05/04/2016    Other specified hypothyroidism 05/04/2016       SURGICAL HISTORY   has a past surgical history that includes appendectomy (1952); tubal ligation (1970); bunionectomy (Right, 1999); carpal tunnel  rel (Right, 2005); shoulder decompression arthroscopic (10/23/2008); shoulder arthroscopy w/ rotator cuff repair (10/23/2008); clavicle distal excision (10/23/2008); bladder suspension (1995); septoplasty (2008); colonoscopy (1990's); revision, reverse total arthroplasty, shoulder (Right, 05/10/2022); hardware removal ortho (Right, 05/10/2022); shoulder surgery (5/10/220); cataract extraction with iol (Bilateral, 2020); arthrodesis ant interbody inc discectomy, cervical below c2 (6/2/2025); anterior instrumentation 2-3 vertebral segme* (6/2/2025); insj biomchn dev intervertebral dsc spc w/arthrd (6/2/2025); and allograft for spine surgery only morselized (6/2/2025).    ALLERGIES  Allergies[1]    CURRENT MEDICATIONS  Home Medications       Reviewed by Jaret Lovett D.O. (Physician) on 06/11/25 at 1702  Med List Status: <None>     Medication Last Dose Status   acetaminophen (TYLENOL) 500 MG Tab Taking Active   albuterol 108 (90 Base) MCG/ACT Aero Soln inhalation aerosol Taking Active   CALCIUM PO Taking Active   Cyanocobalamin (VITAMIN B12 PO) Taking Active   cyclobenzaprine (FLEXERIL) 10 MG Tab Taking Active   donepezil (ARICEPT) 10 MG tablet Taking Active   levothyroxine (SYNTHROID) 50 MCG Tab Taking Active   magnesium citrate Solution  Active   MULTIVITAMINS PO Taking Active   polyethylene glycol 3350 (MIRALAX) 17 GM/SCOOP Powder  Active   traZODone (DESYREL) 50 MG Tab Taking Active   trospium (SANCTURA) 20 MG Tab Taking Active   vitamin D (CHOLECALCIFEROL) 1000 UNIT Tab Taking Active                    SOCIAL HISTORY  Social History     Tobacco Use    Smoking status: Never    Smokeless tobacco: Never   Vaping Use    Vaping status: Never Used   Substance and Sexual Activity    Alcohol use: Yes     Alcohol/week: 0.6 oz     Types: 1 Glasses of wine per week     Comment: Maybe once glass per month    Drug use: Never    Sexual activity: Yes     Partners: Male     Comment:        FAMILY HISTORY  Family  History   Problem Relation Age of Onset    Diabetes Mother         Grandfather    Other Father 69        aneurysm    Dementia Sister     Dementia Brother           Objective:   PHYSICAL EXAM  VITAL SIGNS: BP (!) 142/84 (BP Location: Right arm, Patient Position: Sitting, BP Cuff Size: Adult)   Pulse (!) 101   Temp 36.9 °C (98.4 °F) (Temporal)   Resp 16   Ht 1.524 m (5')   Wt 54.4 kg (120 lb)   SpO2 95%   BMI 23.44 kg/m²     Gen: no acute distress, normal voice  Skin: dry, intact, moist mucosal membranes  Eyes: No conjunctival injection bilaterally.  Neck: Normal range of motion. No meningeal signs.   Lungs: No increased work of breathing.  CTAB w/ symmetric expansion  CV: Sinus tachycardia w/o murmurs or clicks  Abdomen: Bowel sounds normal, soft, no distention. No tenderness, rigidity or involuntary guarding.    Psych: normal affect, normal judgement, alert, awake      Assessment/Plan:     1. Constipation, unspecified constipation type  [id number] VIEW    magnesium citrate Solution    polyethylene glycol 3350 (MIRALAX) 17 GM/SCOOP Powder      Patient is tolerating p.o. intake without experiencing nausea, vomiting or abd ttp, ruling out SBO or ileus.  Good bowel sounds with an unremarkable KUB.  No history of diabetes. Discussed treatment options including OTC management versus ED; patient opted for trial of outpatient management. Strict ED precautions were discussed at length.  Of note, after completion of the visit radiology had not provided an impression of the KUB therefore I contacted the  shortly after completion of the visit reviewing findings over the phone which were unremarkable.  -Ordered magnesium citrate.  Instructed to drink one half bottle over 10 minutes followed by water.    -Ordered MiraLAX bid; discontinue after regular bowel movements or diarrhea begin  -Return to urgent care any new/worsening symptoms or further questions or concerns.  Patient and  understood everything  discussed.  All questions were answered.      31 minutes was spent caring for this patient on the day of the encounter which included review of previous records, speaking with  over the phone, face-to-face time, discussing the diagnosis, medical management, follow-up, return/emergency room precautions and completion of the chart. This does not include time spent on separately billable procedures/tests.      Please note that this dictation was created using voice recognition software. I have made a reasonable attempt to correct obvious errors, but I expect that there are errors of grammar and possibly content that I did not discover before finalizing the note.                [1]   Allergies  Allergen Reactions    Clindamycin Hives    Penicillins Hives    Boniva [Ibandronic Acid] Unspecified     Had jaw necrosis.  Also had same reaction on Fosamax    Fosamax Unspecified     Jaw necrosis. Had same reaction with Boniva.    Ibuprofen Unspecified     PT can not remember

## 2025-08-15 ENCOUNTER — APPOINTMENT (OUTPATIENT)
Dept: BEHAVIORAL HEALTH | Facility: CLINIC | Age: 85
End: 2025-08-15
Payer: MEDICARE

## 2025-08-26 ENCOUNTER — OFFICE VISIT (OUTPATIENT)
Dept: BEHAVIORAL HEALTH | Facility: CLINIC | Age: 85
End: 2025-08-26
Payer: MEDICARE

## (undated) DEVICE — KIT ANESTHESIA W/CIRCUIT & 3/LT BAG W/FILTER (20EA/CA)

## (undated) DEVICE — COVER MAYO STAND X-LG - (22EA/CA)

## (undated) DEVICE — TUBING CLEARLINK DUO-VENT - C-FLO (48EA/CA)

## (undated) DEVICE — PROTECTOR ULNA NERVE - (36PR/CA)

## (undated) DEVICE — SET LEADWIRE 5 LEAD BEDSIDE DISPOSABLE ECG (1SET OF 5/EA)

## (undated) DEVICE — GOWN WARMING STANDARD FLEX - (30/CA)

## (undated) DEVICE — SUTURE 2-0 VICRYL PLUS CT-1 36 (36PK/BX)"

## (undated) DEVICE — GLOVE BIOGEL SZ 8 SURGICAL PF LTX - (50PR/BX 4BX/CA)

## (undated) DEVICE — TOWEL STOP TIMEOUT SAFETY FLAG (40EA/CA)

## (undated) DEVICE — SUTURE 1 VICRYL PLUS CT-1 - 18 INCH (12/BX)

## (undated) DEVICE — TOOL MR8 14CM CYLINDER 5MM DIAMETER (1/EA)

## (undated) DEVICE — SUTURE GENERAL

## (undated) DEVICE — GLOVE BIOGEL SZ 7 SURGICAL PF LTX - (50PR/BX 4BX/CA)

## (undated) DEVICE — Device

## (undated) DEVICE — SUTURE 2-0 MONOCRYL SH&UR-6 27 - (12/BX)

## (undated) DEVICE — SUTURE 3-0 VICRYL PLUS RB-1 - 8 X 18 INCH (12/BX)

## (undated) DEVICE — SUCTION INSTRUMENT YANKAUER BULBOUS TIP W/O VENT (50EA/CA)

## (undated) DEVICE — BAG SPONGE COUNT 10.25 X 32 - BLUE (250/CA)

## (undated) DEVICE — GLOVE BIOGEL INDICATOR SZ 8 SURGICAL PF LTX - (50/BX 4BX/CA)

## (undated) DEVICE — GLOVE BIOGEL INDICATOR SZ 7.5 SURGICAL PF LTX - (50PR/BX 4BX/CA)

## (undated) DEVICE — MASK ANESTHESIA ADULT  - (100/CA)

## (undated) DEVICE — SUTURE PRELOADED #2 ULTRABRAID COBRAID (10EA/BX)

## (undated) DEVICE — NEEDLE SPINAL NON-SAFETY 18 GA X 3 IN (25EA/BX)

## (undated) DEVICE — DRAPE 36X28IN RAD CARM BND BG - (25/CA)

## (undated) DEVICE — BLADE SAGITTAL 6 SYSTEM 25MM

## (undated) DEVICE — DRAPE MICROSCOPE ARMATEC 120IN X 46IN (10EA/CA)

## (undated) DEVICE — CHLORAPREP 26 ML APPLICATOR - ORANGE TINT(25/CA)

## (undated) DEVICE — SHEET PEDIATRIC LAPAROTOMY - (10/CA)

## (undated) DEVICE — BIT DRILL PERFORM OD3.2 MM REVERSE PERIPHERAL SCREW STERILE

## (undated) DEVICE — ELECTRODE DUAL RETURN W/ CORD - (50/PK)

## (undated) DEVICE — HUMID-VENT HEAT AND MOISTURE EXCHANGE- (50/BX)

## (undated) DEVICE — WATER IRRIGATION STERILE 1000ML (12EA/CA)

## (undated) DEVICE — GLOVE BIOGEL PI INDICATOR SZ 8.0 SURGICAL PF LF -(50/BX 4BX/CA)

## (undated) DEVICE — DRESSING TRANSPARENT FILM TEGADERM 4 X 4.75" (50EA/BX)"

## (undated) DEVICE — PACK NEURO - (3EA/CA)

## (undated) DEVICE — PACK TOTAL HIP - (1/CA)

## (undated) DEVICE — KIT SURGIFLO W/OUT THROMBIN - (6EA/BX)

## (undated) DEVICE — SCREW DISTRACTION 14MM YELLOW - STERILE (10EA/BX) (5TX4=20)

## (undated) DEVICE — CLIP INTERNAL LIGATE TITANIUM MEDIUM WECK HORIZON (6EA/PK 30PK/BX)

## (undated) DEVICE — DRESSING AQUACEL AG ADVANTAGE 3.5 X 10" (10EA/BX)"

## (undated) DEVICE — LACTATED RINGERS INJ. 500 ML - (24EA/CA)

## (undated) DEVICE — SENSOR SPO2 NEO LNCS ADHESIVE (20/BX) SEE USER NOTES

## (undated) DEVICE — COVER LIGHT HANDLE ALC PLUS DISP (18EA/BX)

## (undated) DEVICE — CANISTER SUCTION 3000ML MECHANICAL FILTER AUTO SHUTOFF MEDI-VAC NONSTERILE LF DISP (40EA/CA)

## (undated) DEVICE — SENSOR OXIMETER ADULT SPO2 RD SET (20EA/BX)

## (undated) DEVICE — TOOL MR8 14CM MATCH HD SYM-TRI 3MM DIAMETER (1/EA)

## (undated) DEVICE — ELECTRODE 850 FOAM ADHESIVE - HYDROGEL RADIOTRNSPRNT (50/PK)

## (undated) DEVICE — TOWELS CLOTH SURGICAL - (4/PK 20PK/CA)

## (undated) DEVICE — GLOVE SIZE 7.0 SURGEON ACCELERATOR FREE GREEN (50PR/BX 4BX/CA)

## (undated) DEVICE — SET EXTENSION WITH 2 PORTS (48EA/CA) ***PART #2C8610 IS A SUBSTITUTE*****

## (undated) DEVICE — BLADE SAGITTAL SAW DUAL CUT 25.0 X 90.0 X 1.27MM (1/EA)

## (undated) DEVICE — PACK LOWER EXTREMITY - (2/CA)

## (undated) DEVICE — INTRAOP NEURO IN OR 1:1 PER 15 MIN

## (undated) DEVICE — HEAD HOLDER JUNIOR/ADULT

## (undated) DEVICE — TIP INTPLS HFLO ML ORFC BTRY - (12/CS)  FOR SURGILAV

## (undated) DEVICE — KIT EVACUATER 3 SPRING PVC LF 1/8 DRAIN SIZE (10EA/CA)"

## (undated) DEVICE — LENS/HOOD FOR SPACESUIT - (32/PK) PEEL AWAY FACE

## (undated) DEVICE — CANISTER SUCTION RIGID RED 1500CC (40EA/CA)

## (undated) DEVICE — CLIP SM INTNL HRZN TI ESCP LGT - (24EA/PK 25PK/BX)

## (undated) DEVICE — GLOVE, LITE (PAIR)

## (undated) DEVICE — SPONGE GAUZESTER 4 X 4 4PLY - (128PK/CA)

## (undated) DEVICE — SLEEVE VASO DVT COMPRESSION CALF MED - (10PR/CA)

## (undated) DEVICE — GLOVE SZ 7.5 BIOGEL PI MICRO - PF LF (50PR/BX)

## (undated) DEVICE — SODIUM CHL IRRIGATION 0.9% 1000ML (12EA/CA)

## (undated) DEVICE — SPONGE PEANUT - (5/PK 50PK/CA)

## (undated) DEVICE — RESTRAINTS LIMB DISP. - (12/BX 4BX/CA)

## (undated) DEVICE — NEPTUNE 4 PORT MANIFOLD - (20/PK)

## (undated) DEVICE — GLOVE SZ 7.5 LF PROTEXIS (50PR/BX)

## (undated) DEVICE — STAPLER SKIN DISP - (6/BX 10BX/CA) VISISTAT

## (undated) DEVICE — PLATE PIN GOLDIN (2TX3=6)

## (undated) DEVICE — HANDPIECE 10FT INTPLS SCT PLS IRRIGATION HAND CONTROL SET (6/PK)

## (undated) DEVICE — MIDAS LUBRICATOR DIFFUSER PACK (4EA/CA)

## (undated) DEVICE — TUBING C&T SET FLYING LEADS DRAIN TUBING (10EA/BX)

## (undated) DEVICE — DRAPETIBURON SHOULDER W/POUCH - (5EA/CA)

## (undated) DEVICE — LACTATED RINGERS INJ 1000 ML - (14EA/CA 60CA/PF)

## (undated) DEVICE — TUBE CONNECTING SUCTION - CLEAR PLASTIC STERILE 72 IN (50EA/CA)

## (undated) DEVICE — BOVIE BLADE COATED &INSULATED - 25/PK